# Patient Record
Sex: MALE | Race: WHITE | NOT HISPANIC OR LATINO | Employment: FULL TIME | URBAN - METROPOLITAN AREA
[De-identification: names, ages, dates, MRNs, and addresses within clinical notes are randomized per-mention and may not be internally consistent; named-entity substitution may affect disease eponyms.]

---

## 2017-02-01 DIAGNOSIS — Z00.00 HEALTHCARE MAINTENANCE: Primary | ICD-10-CM

## 2017-02-14 ENCOUNTER — OFFICE VISIT (OUTPATIENT)
Dept: INTERNAL MEDICINE | Facility: CLINIC | Age: 37
End: 2017-02-14
Payer: COMMERCIAL

## 2017-02-14 ENCOUNTER — LAB VISIT (OUTPATIENT)
Dept: LAB | Facility: HOSPITAL | Age: 37
End: 2017-02-14
Attending: FAMILY MEDICINE
Payer: COMMERCIAL

## 2017-02-14 VITALS
DIASTOLIC BLOOD PRESSURE: 80 MMHG | WEIGHT: 234.13 LBS | TEMPERATURE: 99 F | BODY MASS INDEX: 33.52 KG/M2 | SYSTOLIC BLOOD PRESSURE: 110 MMHG | HEART RATE: 80 BPM | HEIGHT: 70 IN

## 2017-02-14 DIAGNOSIS — Z00.00 ANNUAL PHYSICAL EXAM: Primary | ICD-10-CM

## 2017-02-14 DIAGNOSIS — Z00.00 HEALTHCARE MAINTENANCE: ICD-10-CM

## 2017-02-14 DIAGNOSIS — Z00.00 ANNUAL PHYSICAL EXAM: ICD-10-CM

## 2017-02-14 LAB
ALBUMIN SERPL BCP-MCNC: 4.3 G/DL
ALP SERPL-CCNC: 69 U/L
ALT SERPL W/O P-5'-P-CCNC: 35 U/L
ANION GAP SERPL CALC-SCNC: 6 MMOL/L
AST SERPL-CCNC: 23 U/L
BASOPHILS # BLD AUTO: 0.01 K/UL
BASOPHILS NFR BLD: 0.1 %
BILIRUB SERPL-MCNC: 0.7 MG/DL
BUN SERPL-MCNC: 13 MG/DL
CALCIUM SERPL-MCNC: 10 MG/DL
CHLORIDE SERPL-SCNC: 102 MMOL/L
CHOLEST/HDLC SERPL: 4.3 {RATIO}
CO2 SERPL-SCNC: 32 MMOL/L
CREAT SERPL-MCNC: 1 MG/DL
DIFFERENTIAL METHOD: NORMAL
EOSINOPHIL # BLD AUTO: 0.1 K/UL
EOSINOPHIL NFR BLD: 0.6 %
ERYTHROCYTE [DISTWIDTH] IN BLOOD BY AUTOMATED COUNT: 14.1 %
EST. GFR  (AFRICAN AMERICAN): >60 ML/MIN/1.73 M^2
EST. GFR  (NON AFRICAN AMERICAN): >60 ML/MIN/1.73 M^2
GLUCOSE SERPL-MCNC: 87 MG/DL
HCT VFR BLD AUTO: 47.8 %
HDL/CHOLESTEROL RATIO: 23.1 %
HDLC SERPL-MCNC: 216 MG/DL
HDLC SERPL-MCNC: 50 MG/DL
HGB BLD-MCNC: 16.8 G/DL
LDLC SERPL CALC-MCNC: 148.6 MG/DL
LYMPHOCYTES # BLD AUTO: 2.2 K/UL
LYMPHOCYTES NFR BLD: 26.4 %
MAGNESIUM SERPL-MCNC: 2.3 MG/DL
MCH RBC QN AUTO: 30.9 PG
MCHC RBC AUTO-ENTMCNC: 35.1 %
MCV RBC AUTO: 88 FL
MONOCYTES # BLD AUTO: 0.5 K/UL
MONOCYTES NFR BLD: 6.2 %
NEUTROPHILS # BLD AUTO: 5.6 K/UL
NEUTROPHILS NFR BLD: 66.3 %
NONHDLC SERPL-MCNC: 166 MG/DL
PLATELET # BLD AUTO: 232 K/UL
PMV BLD AUTO: 9.7 FL
POTASSIUM SERPL-SCNC: 4.8 MMOL/L
PROT SERPL-MCNC: 7.7 G/DL
RBC # BLD AUTO: 5.44 M/UL
SODIUM SERPL-SCNC: 140 MMOL/L
TRIGL SERPL-MCNC: 87 MG/DL
TSH SERPL DL<=0.005 MIU/L-ACNC: 1.29 UIU/ML
WBC # BLD AUTO: 8.42 K/UL

## 2017-02-14 PROCEDURE — 80061 LIPID PANEL: CPT

## 2017-02-14 PROCEDURE — 99395 PREV VISIT EST AGE 18-39: CPT | Mod: S$GLB,,, | Performed by: FAMILY MEDICINE

## 2017-02-14 PROCEDURE — 99999 PR PBB SHADOW E&M-EST. PATIENT-LVL III: CPT | Mod: PBBFAC,,, | Performed by: FAMILY MEDICINE

## 2017-02-14 PROCEDURE — 36415 COLL VENOUS BLD VENIPUNCTURE: CPT | Mod: PO

## 2017-02-14 PROCEDURE — 83735 ASSAY OF MAGNESIUM: CPT

## 2017-02-14 PROCEDURE — 84443 ASSAY THYROID STIM HORMONE: CPT

## 2017-02-14 PROCEDURE — 85025 COMPLETE CBC W/AUTO DIFF WBC: CPT

## 2017-02-14 PROCEDURE — 80053 COMPREHEN METABOLIC PANEL: CPT

## 2017-02-14 RX ORDER — FINASTERIDE 1 MG/1
1 TABLET, FILM COATED ORAL DAILY
COMMUNITY
End: 2017-02-14 | Stop reason: SDUPTHER

## 2017-02-14 RX ORDER — FINASTERIDE 1 MG/1
1 TABLET, FILM COATED ORAL DAILY
Qty: 30 TABLET | Refills: 11 | Status: SHIPPED | OUTPATIENT
Start: 2017-02-14 | End: 2018-04-23 | Stop reason: SDUPTHER

## 2017-02-14 NOTE — MR AVS SNAPSHOT
[facility]  [address] [place] [address]  Phone: [phone]  Fax: [phone]                  Luiz Ybarra   2017 9:40 AM   Office Visit    Description:  Male : 1980   Provider:  Rodrigo Hernandez MD   [facility]           Reason for Visit     Annual Exam           Diagnoses this Visit        Comments    Annual physical exam    -  Primary            To Do List           Future Appointments        Provider Department Dept Phone    2017 2:20 PM LABORATORY, [facility] [phone]      Goals (5 Years of Data)     None      Follow-Up and Disposition     Return in about 1 year (around 2018), or if symptoms worsen or fail to improve.       These Medications        Disp Refills Start End    finasteride (PROPECIA) 1 mg tablet 30 tablet 11 2017     Take 1 tablet (1 mg total) by mouth once daily. - Oral    Pharm[place]: [facility] [place] [address] Ph #: [phone]         [facility]     [facility] Nurse Care Line -  Assistance  Registered nurses in the [facility] provide clinical advisement, health education, appointment booking, and other advisory services.  Call for this free service at [phone].             Medications           Message regarding Medications     Verify the changes and/or additions to your medication regime listed below are the same as discussed with your clinician today.  If any of these changes or additions are incorrect, please notify your healthcare provider.        START taking these NEW medications        Refills    finasteride (PROPECIA) 1 mg tablet 11    Sig: Take 1 tablet (1 mg total) by mouth once daily.    Class: Normal    Route: Oral           Verify that the below list of medications is an accurate representation of the medications you are currently taking.  If none  "reported, the list may be blank. If incorrect, please contact your healthcare provider. Carry this list with you in case of emergency.           Current Medications     finasteride (PROPECIA) 1 mg tablet Take 1 tablet (1 mg total) by mouth once daily.    ASCORBATE CALCIUM (VITAMIN C ORAL) Take by mouth once daily.     levocetirizine (XYZAL) 5 MG tablet Take 1 tablet (5 mg total) by mouth every evening.    triamcinolone acetonide 0.1% (KENALOG) 0.1 % cream Apply topically 2 (two) times daily.           Clinical Reference Information           Your Vitals Were     BP Pulse Temp Height Weight BMI    110/80 80 98.8 °F (37.1 °C) 5' 9.5" (1.765 m) 106.2 kg (234 lb 2.1 oz) 34.08 kg/m2      Blood Pressure          Most Recent Value    BP  110/80      Allergies as of 2/14/2017     No Known Allergies      Immunizations Administered on Date of Encounter - 2/14/2017     None      Orders Placed During Today's Visit     Future Labs/Procedures Expected by Expires    Magnesium  2/14/2017 4/15/2018      Language Assistance Services     ATTENTION: Language assistance services are available, free of charge. Please call [phone].      ATENCIÓN: Si scarla joe, tiene a pinzon disposición servicios gratuitos de asistencia lingüística. Llame al [phone].     [facility] Ý: N?u b?n nói Ti?ng Vi?t, có các d?ch v? h? tr? ngôn ng? mi?n phí dành cho b?n. G?i s? [phone].         [facility] complies with applicable Federal civil rights laws and does not discriminate on the basis of race, color, national origin, age, disability, or sex.        "

## 2017-02-16 NOTE — PROGRESS NOTES
"Subjective:      Patient ID: Luiz Ybarra is a 37 y.o. male.    Chief Complaint: Annual Exam    HPI  38 yo male here to update annual visit.  Has gained some weight since physical 3-4 yrs ago.  Not eating well, not exercising.  C/O occ muscle spasms in extremities.  Does not drink water much at all, just soda.  All day.  Vaccines up to date.  Needs refill on Propecia    Past Medical History   Diagnosis Date    Hypoglycemia      Family History   Problem Relation Age of Onset    Diabetes Father     Diabetes Sister     Diabetes Maternal Grandmother     Colon cancer Maternal Grandmother     Diabetes Paternal Grandmother     Cancer Paternal Grandfather     Colon cancer Maternal Uncle     Heart disease Neg Hx      Past Surgical History   Procedure Laterality Date    Knee surgery       Social History   Substance Use Topics    Smoking status: Never Smoker    Smokeless tobacco: Never Used    Alcohol use No       Visit Vitals    /80    Pulse 80    Temp 98.8 °F (37.1 °C)    Ht 5' 9.5" (1.765 m)    Wt 106.2 kg (234 lb 2.1 oz)    BMI 34.08 kg/m2       Review of Systems   Constitutional: Positive for fatigue. Negative for activity change, appetite change, chills, diaphoresis, fever and unexpected weight change.   HENT: Negative for hearing loss and tinnitus.    Eyes: Negative for visual disturbance.   Respiratory: Negative for cough, chest tightness, shortness of breath and wheezing.    Cardiovascular: Negative for chest pain, palpitations and leg swelling.   Gastrointestinal: Negative for abdominal distention, abdominal pain, constipation and diarrhea.   Genitourinary: Negative for difficulty urinating.   Musculoskeletal: Negative for arthralgias and back pain.   Skin: Negative.    Neurological: Negative for dizziness, weakness, numbness and headaches.   Psychiatric/Behavioral: Negative.      Objective:     Physical Exam   Constitutional: He is oriented to person, place, and time. He appears " well-developed and well-nourished. No distress.   HENT:   Right Ear: External ear normal.   Left Ear: External ear normal.   Nose: Nose normal.   Mouth/Throat: Oropharynx is clear and moist.   Eyes: Pupils are equal, round, and reactive to light.   Neck: Normal range of motion. Neck supple. No thyromegaly present.   Cardiovascular: Normal rate, regular rhythm and normal heart sounds.    No murmur heard.  Pulmonary/Chest: Effort normal and breath sounds normal. No respiratory distress. He has no wheezes.   Abdominal: Soft. Bowel sounds are normal. He exhibits no distension and no mass.   Musculoskeletal: He exhibits no edema or tenderness.   Lymphadenopathy:     He has no cervical adenopathy.   Neurological: He is alert and oriented to person, place, and time. He has normal strength. He displays no tremor. No cranial nerve deficit. He exhibits normal muscle tone. Coordination normal.   Skin: Skin is warm and dry. He is not diaphoretic.   Nursing note and vitals reviewed.      Lab Results   Component Value Date    WBC 8.42 02/14/2017    HGB 16.8 02/14/2017    HCT 47.8 02/14/2017     02/14/2017    CHOL 216 (H) 02/14/2017    TRIG 87 02/14/2017    HDL 50 02/14/2017    ALT 35 02/14/2017    AST 23 02/14/2017     02/14/2017    K 4.8 02/14/2017     02/14/2017    CREATININE 1.0 02/14/2017    BUN 13 02/14/2017    CO2 32 (H) 02/14/2017    TSH 1.287 02/14/2017       Assessment:     1. Annual physical exam       Plan:   Annual physical exam  -     Magnesium; Future; Expected date: 2/14/17    Other orders  -     finasteride (PROPECIA) 1 mg tablet; Take 1 tablet (1 mg total) by mouth once daily.  Dispense: 30 tablet; Refill: 11    Update annual labs, Add Mg  Drink more water, reduce caffeine intake..slowly.  If no improvement let MD know.  No twitching/fasiculations noted on exam.  No abnormal muscular or Neuro findings.  Refill on Propecia  Focus on weight loss with better eating habits and exercise  F/u  annually and PRN

## 2017-07-19 ENCOUNTER — LAB VISIT (OUTPATIENT)
Dept: LAB | Facility: HOSPITAL | Age: 37
End: 2017-07-19
Attending: NURSE PRACTITIONER
Payer: COMMERCIAL

## 2017-07-19 ENCOUNTER — OFFICE VISIT (OUTPATIENT)
Dept: URGENT CARE | Facility: CLINIC | Age: 37
End: 2017-07-19
Payer: COMMERCIAL

## 2017-07-19 VITALS
BODY MASS INDEX: 32.17 KG/M2 | DIASTOLIC BLOOD PRESSURE: 70 MMHG | OXYGEN SATURATION: 98 % | HEART RATE: 71 BPM | TEMPERATURE: 98 F | WEIGHT: 224.75 LBS | SYSTOLIC BLOOD PRESSURE: 116 MMHG | HEIGHT: 70 IN

## 2017-07-19 DIAGNOSIS — M79.674 CHRONIC TOE PAIN, RIGHT FOOT: Primary | ICD-10-CM

## 2017-07-19 DIAGNOSIS — M79.674 CHRONIC TOE PAIN, RIGHT FOOT: ICD-10-CM

## 2017-07-19 DIAGNOSIS — G89.29 CHRONIC TOE PAIN, RIGHT FOOT: Primary | ICD-10-CM

## 2017-07-19 DIAGNOSIS — G89.29 CHRONIC TOE PAIN, RIGHT FOOT: ICD-10-CM

## 2017-07-19 LAB — URATE SERPL-MCNC: 8.5 MG/DL

## 2017-07-19 PROCEDURE — 99214 OFFICE O/P EST MOD 30 MIN: CPT | Mod: S$GLB,,, | Performed by: NURSE PRACTITIONER

## 2017-07-19 PROCEDURE — 84550 ASSAY OF BLOOD/URIC ACID: CPT | Mod: PO

## 2017-07-19 PROCEDURE — 36415 COLL VENOUS BLD VENIPUNCTURE: CPT | Mod: PO

## 2017-07-19 PROCEDURE — 99999 PR PBB SHADOW E&M-EST. PATIENT-LVL IV: CPT | Mod: PBBFAC,,, | Performed by: NURSE PRACTITIONER

## 2017-07-19 RX ORDER — TRAMADOL HYDROCHLORIDE 50 MG/1
50 TABLET ORAL EVERY 6 HOURS PRN
Qty: 16 TABLET | Refills: 0 | Status: SHIPPED | OUTPATIENT
Start: 2017-07-19 | End: 2017-07-29

## 2017-07-19 RX ORDER — IBUPROFEN 800 MG/1
800 TABLET ORAL EVERY 8 HOURS PRN
Qty: 20 TABLET | Refills: 0 | Status: SHIPPED | OUTPATIENT
Start: 2017-07-19 | End: 2018-07-23

## 2017-07-19 NOTE — PROGRESS NOTES
Subjective:       Patient ID: Luiz Ybarra is a 37 y.o. male.    Chief Complaint: arthritis of big toe    Pt is a 37 year old male to clinic today with complaints of right great toe pain and arthritis that began this morning. Pt states he has seen ortho and has ruled out gout and was dx with arthritis.       Toe Pain    The incident occurred 3 to 6 hours ago. The incident occurred at home. There was no injury mechanism. The pain is present in the right toes. The quality of the pain is described as aching. The pain is at a severity of 4/10. The pain is mild. The pain has been constant since onset. Pertinent negatives include no inability to bear weight, loss of motion, loss of sensation, muscle weakness, numbness or tingling. He reports no foreign bodies present. The symptoms are aggravated by movement and weight bearing. He has tried NSAIDs for the symptoms. The treatment provided mild relief.     Review of Systems   Constitutional: Negative for chills, diaphoresis, fatigue and fever.   HENT: Negative for congestion, sinus pressure and sore throat.    Eyes: Negative for pain.   Respiratory: Negative for cough, chest tightness, shortness of breath and wheezing.    Cardiovascular: Negative for chest pain and palpitations.   Gastrointestinal: Negative for abdominal pain, diarrhea, nausea and vomiting.   Genitourinary: Negative for dysuria.   Musculoskeletal: Positive for arthralgias and gait problem (ambulating with limp). Negative for back pain, myalgias and neck pain.   Skin: Negative for rash.   Neurological: Negative for dizziness, tingling, light-headedness, numbness and headaches.       Objective:      Physical Exam   Constitutional: He is oriented to person, place, and time. He appears well-developed and well-nourished. No distress.   HENT:   Head: Normocephalic.   Right Ear: External ear normal.   Left Ear: External ear normal.   Nose: Nose normal.   Eyes: Pupils are equal, round, and reactive to light.    Musculoskeletal: Normal range of motion. He exhibits tenderness. He exhibits no edema or deformity.        Right foot: There is tenderness. There is normal range of motion, no bony tenderness, no swelling, normal capillary refill, no crepitus, no deformity and no laceration.        Feet:    Neurological: He is alert and oriented to person, place, and time.   Skin: Skin is warm and dry. Capillary refill takes 2 to 3 seconds. No rash noted. He is not diaphoretic.   Psychiatric: He has a normal mood and affect. His speech is normal and behavior is normal.   Nursing note and vitals reviewed.      Assessment:       1. Chronic toe pain, right foot        Plan:   Chronic toe pain, right foot  -     Uric acid; Future; Expected date: 07/19/2017  -     tramadol (ULTRAM) 50 mg tablet; Take 1 tablet (50 mg total) by mouth every 6 (six) hours as needed for Pain.  Dispense: 16 tablet; Refill: 0  -     ibuprofen (ADVIL,MOTRIN) 800 MG tablet; Take 1 tablet (800 mg total) by mouth every 8 (eight) hours as needed for Pain.  Dispense: 20 tablet; Refill: 0      Tramadol causes drowsiness. Take at bedtime.  Will call with abnormal lab results.  Recommend RICE method.     Follow prescribed treatment plan as directed.  Stay hydrated and rest.  Report to ER if symptoms worsen.  Follow up with PCP in 2-3 days or sooner if symptoms do not improve.

## 2017-07-19 NOTE — PATIENT INSTRUCTIONS

## 2017-09-19 ENCOUNTER — PATIENT MESSAGE (OUTPATIENT)
Dept: INTERNAL MEDICINE | Facility: CLINIC | Age: 37
End: 2017-09-19

## 2017-09-19 ENCOUNTER — OFFICE VISIT (OUTPATIENT)
Dept: INTERNAL MEDICINE | Facility: CLINIC | Age: 37
End: 2017-09-19
Payer: COMMERCIAL

## 2017-09-19 ENCOUNTER — HOSPITAL ENCOUNTER (OUTPATIENT)
Dept: RADIOLOGY | Facility: HOSPITAL | Age: 37
Discharge: HOME | End: 2017-09-19
Attending: FAMILY MEDICINE
Payer: COMMERCIAL

## 2017-09-19 VITALS
SYSTOLIC BLOOD PRESSURE: 104 MMHG | HEIGHT: 69 IN | WEIGHT: 214.5 LBS | DIASTOLIC BLOOD PRESSURE: 80 MMHG | BODY MASS INDEX: 31.77 KG/M2 | HEART RATE: 70 BPM | TEMPERATURE: 97 F

## 2017-09-19 DIAGNOSIS — R19.4 BOWEL HABIT CHANGES: ICD-10-CM

## 2017-09-19 DIAGNOSIS — R10.32 LLQ PAIN: Primary | ICD-10-CM

## 2017-09-19 DIAGNOSIS — R10.32 LLQ PAIN: ICD-10-CM

## 2017-09-19 PROCEDURE — 74177 CT ABD & PELVIS W/CONTRAST: CPT | Mod: TC

## 2017-09-19 PROCEDURE — 99214 OFFICE O/P EST MOD 30 MIN: CPT | Mod: S$GLB,,, | Performed by: FAMILY MEDICINE

## 2017-09-19 PROCEDURE — 99999 PR PBB SHADOW E&M-EST. PATIENT-LVL III: CPT | Mod: PBBFAC,,, | Performed by: FAMILY MEDICINE

## 2017-09-19 PROCEDURE — 3008F BODY MASS INDEX DOCD: CPT | Mod: S$GLB,,, | Performed by: FAMILY MEDICINE

## 2017-09-19 PROCEDURE — 25500020 PHARM REV CODE 255: Performed by: FAMILY MEDICINE

## 2017-09-19 RX ADMIN — IOHEXOL 75 ML: 350 INJECTION, SOLUTION INTRAVENOUS at 01:09

## 2017-09-19 RX ADMIN — IOHEXOL 30 ML: 350 INJECTION, SOLUTION INTRAVENOUS at 11:09

## 2017-09-19 NOTE — TELEPHONE ENCOUNTER
Need pt to see GI/MD please, for LLQ pain/abnormal CT  Dr. Ybarra's .    Please schedule soonest available.

## 2017-09-19 NOTE — PROGRESS NOTES
"Subjective:      Patient ID: Luiz Ybarra is a 37 y.o. male.    Chief Complaint: Abdominal Pain (x 1 mo)    HPI  36 yo male here with c/o LLQ pain for about one month now.  Last month, at onset of pain he was double over due to the pain in the LLQ.  He saw one of his dad's partners, felt maybe it was diverticulitis.  Given flagyl and pain kind of resolved.  He has had a continuous/dull pain in the LLQ.  No radiation.  Bowels vary, used to be very regular.  Now, since Feb and changing up his diet, doing low carb/high protein he finds he is more constipated.  No blood in the stool.  Intentional weight loss.  No fever/chills.  No N/V.   Maternal side with several members who had Colon Ca.  That does worry him some.    Past Medical History:   Diagnosis Date    Hypoglycemia      Family History   Problem Relation Age of Onset    Diabetes Father     Diabetes Sister     Diabetes Maternal Grandmother     Colon cancer Maternal Grandmother     Diabetes Paternal Grandmother     Cancer Paternal Grandfather     Colon cancer Maternal Uncle     Heart disease Neg Hx      Past Surgical History:   Procedure Laterality Date    KNEE SURGERY       Social History   Substance Use Topics    Smoking status: Never Smoker    Smokeless tobacco: Never Used    Alcohol use No       /80   Pulse 70   Temp 96.7 °F (35.9 °C) (Tympanic)   Ht 5' 9.25" (1.759 m)   Wt 97.3 kg (214 lb 8.1 oz)   BMI 31.45 kg/m²     Review of Systems   Constitutional: Negative for appetite change, chills, fever and unexpected weight change.   HENT: Negative.    Respiratory: Negative.    Cardiovascular: Negative.    Gastrointestinal: Positive for abdominal pain. Negative for abdominal distention, anal bleeding, nausea and vomiting.   Genitourinary: Negative.      Objective:     Physical Exam   Constitutional: He appears well-developed and well-nourished.   Cardiovascular: Normal rate, regular rhythm and normal heart sounds.    Pulmonary/Chest: " Effort normal and breath sounds normal. No respiratory distress. He has no wheezes.   Abdominal: Soft. Bowel sounds are normal. He exhibits no distension and no mass. There is tenderness in the left lower quadrant. There is no rigidity, no rebound and no guarding.   Nursing note and vitals reviewed.      Lab Results   Component Value Date    WBC 8.42 02/14/2017    HGB 16.8 02/14/2017    HCT 47.8 02/14/2017     02/14/2017    CHOL 216 (H) 02/14/2017    TRIG 87 02/14/2017    HDL 50 02/14/2017    ALT 35 02/14/2017    AST 23 02/14/2017     02/14/2017    K 4.8 02/14/2017     02/14/2017    CREATININE 1.0 02/14/2017    BUN 13 02/14/2017    CO2 32 (H) 02/14/2017    TSH 1.287 02/14/2017       Assessment:     1. LLQ pain    2. Bowel habit changes       Plan:   LLQ pain  -     CT Abdomen Pelvis With Contrast; Future; Expected date: 09/19/2017    Bowel habit changes  -     CT Abdomen Pelvis With Contrast; Future; Expected date: 09/19/2017    Seems a bit young for diverticulitis, but possible given some of the dietary changes.  No labs today, no sign of active infection.  Consider Crohn's/UC??  Will get CT of the abd/pelvis and likely consider GI consult to discuss Colon screening b/c of hx.  Sister, 41 yo has had scope with +precancerous polyps.  F/U to be determined

## 2017-09-20 ENCOUNTER — PATIENT MESSAGE (OUTPATIENT)
Dept: INTERNAL MEDICINE | Facility: CLINIC | Age: 37
End: 2017-09-20

## 2017-09-20 NOTE — TELEPHONE ENCOUNTER
Booked pt for 9-25-17 with Dr. Colón [facility].  Is there anyway you can work pt in sooner?  He is Dr. Ybarra .  Thank you!  Any Doctor would be fine.

## 2017-10-09 ENCOUNTER — OFFICE VISIT (OUTPATIENT)
Dept: GASTROENTEROLOGY | Facility: CLINIC | Age: 37
End: 2017-10-09
Payer: COMMERCIAL

## 2017-10-09 ENCOUNTER — HOSPITAL ENCOUNTER (OUTPATIENT)
Dept: RADIOLOGY | Facility: HOSPITAL | Age: 37
Discharge: HOME | End: 2017-10-09
Attending: INTERNAL MEDICINE
Payer: COMMERCIAL

## 2017-10-09 VITALS
BODY MASS INDEX: 31.71 KG/M2 | WEIGHT: 214.06 LBS | SYSTOLIC BLOOD PRESSURE: 110 MMHG | HEIGHT: 69 IN | DIASTOLIC BLOOD PRESSURE: 80 MMHG | HEART RATE: 74 BPM

## 2017-10-09 DIAGNOSIS — M79.3 PANNICULITIS: ICD-10-CM

## 2017-10-09 DIAGNOSIS — R10.32 LLQ ABDOMINAL PAIN: Primary | ICD-10-CM

## 2017-10-09 DIAGNOSIS — K57.30 DIVERTICULOSIS OF LARGE INTESTINE WITHOUT HEMORRHAGE: ICD-10-CM

## 2017-10-09 DIAGNOSIS — R10.32 LLQ ABDOMINAL PAIN: ICD-10-CM

## 2017-10-09 DIAGNOSIS — K59.04 CHRONIC IDIOPATHIC CONSTIPATION: ICD-10-CM

## 2017-10-09 PROCEDURE — 74020 XR ABDOMEN FLAT AND ERECT: CPT | Mod: TC

## 2017-10-09 PROCEDURE — 74020 XR ABDOMEN FLAT AND ERECT: CPT | Mod: 26,,, | Performed by: RADIOLOGY

## 2017-10-09 PROCEDURE — 99999 PR PBB SHADOW E&M-EST. PATIENT-LVL III: CPT | Mod: PBBFAC,,, | Performed by: INTERNAL MEDICINE

## 2017-10-09 PROCEDURE — 99203 OFFICE O/P NEW LOW 30 MIN: CPT | Mod: S$GLB,,, | Performed by: INTERNAL MEDICINE

## 2017-10-09 RX ORDER — SODIUM, POTASSIUM,MAG SULFATES 17.5-3.13G
SOLUTION, RECONSTITUTED, ORAL ORAL
Qty: 254 ML | Refills: 0 | Status: ON HOLD | OUTPATIENT
Start: 2017-10-09 | End: 2017-11-06 | Stop reason: CLARIF

## 2017-10-09 NOTE — PROGRESS NOTES
Subjective:       Patient ID: Luiz Ybarra is a 37 y.o. male.    Chief Complaint: Abdominal Pain; Constipation; and Diarrhea    The patient presents with complaint of LLQ pain starting about 2 months ago. He has started on a low carb diet with weight loss of ~ 25 pounds over the last 8 months. He did not see any real change in his bowel activity initially, but had more diarrhea before the start of the diet, but now has had more constipation. There was no change in bowel habits at the time of the onset of pain and he had no fever chills or sweats. The pain he describes as sharp in character and there were no aggrevating or alleviating factors. It would come and go lasting a few minutes, but when it was gone it would not stay away for long. He saw a physician in his home town, and was suspected of having diverticulitis but was unable to see an MD for 3 weeks. Fortunately the discomfort's intensity  down. He was treated with Flagyl by his home MD for ~ 10 days, and his symptoms improved over time. He did not have Cipro.     There was no associated nausea or vomiting. There was no BRBPR, and no unintended weight loss during the time of his symptoms. There was no true diarrhea, as during more questioning the increased frequency of BMs described was actually formed stool .     He was seen by his PCP and referred to GI. He had a CT done off the abdomen and pelvis, revealing diverticulosis with suspicion of a low grade diverticulitis versus panniculitis with shoddy mesenteric nodes.       Review of Systems   Constitutional: Positive for fatigue. Negative for activity change, appetite change, chills, diaphoresis, fever and unexpected weight change.   HENT: Negative for congestion, ear discharge, facial swelling, hearing loss, nosebleeds, postnasal drip, sinus pressure, sneezing, tinnitus, trouble swallowing and voice change.    Eyes: Negative for photophobia, redness and visual disturbance.   Respiratory:  Negative for cough, chest tightness, shortness of breath and wheezing.    Cardiovascular: Negative for chest pain and palpitations.   Gastrointestinal: Positive for abdominal pain and constipation. Negative for abdominal distention, blood in stool, diarrhea, nausea, rectal pain and vomiting.   Genitourinary: Negative for difficulty urinating, discharge, dysuria, flank pain, frequency, hematuria, scrotal swelling, testicular pain and urgency.   Musculoskeletal: Negative for arthralgias, back pain, gait problem, joint swelling, myalgias and neck stiffness.        Leg cramps   Skin: Negative for color change, pallor, rash and wound.   Neurological: Positive for headaches. Negative for dizziness, tremors, seizures, syncope, facial asymmetry, speech difficulty, weakness, light-headedness and numbness.   Hematological: Negative for adenopathy. Does not bruise/bleed easily.   Psychiatric/Behavioral: Negative for agitation, confusion, hallucinations, sleep disturbance and suicidal ideas.       Objective:      Physical Exam   Constitutional: He is oriented to person, place, and time. He appears well-developed and well-nourished. No distress.   HENT:   Head: Normocephalic and atraumatic.   Nose: Nose normal.   Mouth/Throat: Oropharynx is clear and moist. No oropharyngeal exudate.   Eyes: Conjunctivae are normal. Pupils are equal, round, and reactive to light. No scleral icterus.   Neck: Normal range of motion. Neck supple. No thyromegaly present.   Cardiovascular: Normal rate and regular rhythm.  Exam reveals no gallop and no friction rub.    No murmur heard.  Pulmonary/Chest: Effort normal and breath sounds normal. No respiratory distress. He has no wheezes. He has no rales.   Abdominal: Soft. Bowel sounds are normal. He exhibits no distension and no mass. There is tenderness. There is no rebound and no guarding.   Mild tenderness LLQ   Musculoskeletal: He exhibits no edema or tenderness.   Lymphadenopathy:     He has no  cervical adenopathy.   Neurological: He is alert and oriented to person, place, and time. He exhibits normal muscle tone. Coordination normal.   Skin: Skin is warm. No rash noted. He is not diaphoretic.   Psychiatric: He has a normal mood and affect. His behavior is normal. Judgment and thought content normal.   Vitals reviewed.      Assessment:    Diverticulosis   LLQ Abdominal Pain   Possible diverticulitis vs panniculitis       Treated with Flagyl   Constipation   FH colon polyps and Cancer  No diagnosis found.    Plan:     KUB. Colonoscopy

## 2017-10-09 NOTE — LETTER
October 9, 2017      Rodrigo Hernandez MD  [address] [place] [address]           [facility]  [address] [place] [address]  Phone: [phone]  Fax: [phone]          Patient: Luiz Ybarra   MR Number: [id number]   YOB: 1980   Date of Visit: 10/9/2017       Dear Dr. Rodrigo Hernandez:    Thank you for referring Luiz Ybarra to me for evaluation. Attached you will find relevant portions of my assessment and plan of care.    If you have questions, please do not hesitate to call me. I look forward to following Luiz Ybarra along with you.    Sincerely,    Jovani Colón III, MD    Enclosure  CC:  No Recipients    If you would like to receive this communication electronically, please contact [email or web address] or [phone] to request more information on [employer] access.    For providers and/or their staff who would like to refer a patient to [facility], please contact us through our one-stop-shop provider referral line, [facility], at [phone].    If you feel you have received this communication in error or would no longer like to receive these types of communications, please e-mail [email or web address]

## 2017-10-18 ENCOUNTER — PATIENT MESSAGE (OUTPATIENT)
Dept: GASTROENTEROLOGY | Facility: CLINIC | Age: 37
End: 2017-10-18

## 2017-11-06 ENCOUNTER — HOSPITAL ENCOUNTER (OUTPATIENT)
Facility: HOSPITAL | Age: 37
Discharge: HOME | End: 2017-11-06
Attending: INTERNAL MEDICINE | Admitting: INTERNAL MEDICINE
Payer: COMMERCIAL

## 2017-11-06 ENCOUNTER — SURGERY (OUTPATIENT)
Age: 37
End: 2017-11-06

## 2017-11-06 ENCOUNTER — ANESTHESIA (OUTPATIENT)
Dept: ENDOSCOPY | Facility: HOSPITAL | Age: 37
End: 2017-11-06
Payer: COMMERCIAL

## 2017-11-06 ENCOUNTER — ANESTHESIA EVENT (OUTPATIENT)
Dept: ENDOSCOPY | Facility: HOSPITAL | Age: 37
End: 2017-11-06
Payer: COMMERCIAL

## 2017-11-06 VITALS — RESPIRATION RATE: 15 BRPM

## 2017-11-06 DIAGNOSIS — R10.32 LLQ ABDOMINAL PAIN: Primary | ICD-10-CM

## 2017-11-06 DIAGNOSIS — R19.4 CHANGE IN BOWEL HABITS: ICD-10-CM

## 2017-11-06 DIAGNOSIS — K57.30 DIVERTICULOSIS OF LARGE INTESTINE WITHOUT HEMORRHAGE: ICD-10-CM

## 2017-11-06 DIAGNOSIS — R93.5 ABNORMAL ABDOMINAL CT SCAN: ICD-10-CM

## 2017-11-06 PROCEDURE — 37000008 HC ANESTHESIA 1ST 15 MINUTES: Performed by: INTERNAL MEDICINE

## 2017-11-06 PROCEDURE — 63600175 PHARM REV CODE 636 W HCPCS: Performed by: NURSE ANESTHETIST, CERTIFIED REGISTERED

## 2017-11-06 PROCEDURE — 45378 DIAGNOSTIC COLONOSCOPY: CPT | Mod: ,,, | Performed by: INTERNAL MEDICINE

## 2017-11-06 PROCEDURE — 37000009 HC ANESTHESIA EA ADD 15 MINS: Performed by: INTERNAL MEDICINE

## 2017-11-06 PROCEDURE — 25000003 PHARM REV CODE 250: Performed by: INTERNAL MEDICINE

## 2017-11-06 PROCEDURE — 45378 DIAGNOSTIC COLONOSCOPY: CPT | Performed by: INTERNAL MEDICINE

## 2017-11-06 PROCEDURE — 25000003 PHARM REV CODE 250: Performed by: NURSE ANESTHETIST, CERTIFIED REGISTERED

## 2017-11-06 RX ORDER — PROPOFOL 10 MG/ML
VIAL (ML) INTRAVENOUS
Status: DISCONTINUED | OUTPATIENT
Start: 2017-11-06 | End: 2017-11-06

## 2017-11-06 RX ORDER — SODIUM CHLORIDE, SODIUM LACTATE, POTASSIUM CHLORIDE, CALCIUM CHLORIDE 600; 310; 30; 20 MG/100ML; MG/100ML; MG/100ML; MG/100ML
INJECTION, SOLUTION INTRAVENOUS CONTINUOUS
Status: DISCONTINUED | OUTPATIENT
Start: 2017-11-06 | End: 2017-11-06 | Stop reason: HOSPADM

## 2017-11-06 RX ORDER — SODIUM CHLORIDE, SODIUM LACTATE, POTASSIUM CHLORIDE, CALCIUM CHLORIDE 600; 310; 30; 20 MG/100ML; MG/100ML; MG/100ML; MG/100ML
INJECTION, SOLUTION INTRAVENOUS CONTINUOUS PRN
Status: DISCONTINUED | OUTPATIENT
Start: 2017-11-06 | End: 2017-11-06

## 2017-11-06 RX ORDER — LIDOCAINE HYDROCHLORIDE 20 MG/ML
INJECTION, SOLUTION EPIDURAL; INFILTRATION; INTRACAUDAL; PERINEURAL
Status: DISCONTINUED | OUTPATIENT
Start: 2017-11-06 | End: 2017-11-06

## 2017-11-06 RX ADMIN — PROPOFOL 70 MG: 10 INJECTION, EMULSION INTRAVENOUS at 07:11

## 2017-11-06 RX ADMIN — PROPOFOL 30 MG: 10 INJECTION, EMULSION INTRAVENOUS at 08:11

## 2017-11-06 RX ADMIN — SODIUM CHLORIDE, SODIUM LACTATE, POTASSIUM CHLORIDE, AND CALCIUM CHLORIDE: .6; .31; .03; .02 INJECTION, SOLUTION INTRAVENOUS at 07:11

## 2017-11-06 RX ADMIN — LIDOCAINE HYDROCHLORIDE 40 MG: 20 INJECTION, SOLUTION EPIDURAL; INFILTRATION; INTRACAUDAL; PERINEURAL at 07:11

## 2017-11-06 RX ADMIN — SODIUM CHLORIDE, SODIUM LACTATE, POTASSIUM CHLORIDE, AND CALCIUM CHLORIDE: 600; 310; 30; 20 INJECTION, SOLUTION INTRAVENOUS at 07:11

## 2017-11-06 RX ADMIN — PROPOFOL 50 MG: 10 INJECTION, EMULSION INTRAVENOUS at 08:11

## 2017-11-06 NOTE — DISCHARGE INSTRUCTIONS

## 2017-11-06 NOTE — INTERVAL H&P NOTE
The patient has been examined and the H&P has been reviewed:  Family History   Problem Relation Age of Onset    Diabetes Father     Diabetes Sister     Colon polyps Sister     Diabetes Maternal Grandmother     Colon cancer Maternal Grandmother     Colon polyps Maternal Grandmother     Diabetes Paternal Grandmother     Cancer Paternal Grandfather     Colon cancer Maternal Uncle     Colon polyps Mother     Heart disease Neg Hx      Past Medical History:   Diagnosis Date    Hypoglycemia      Past Surgical History:   Procedure Laterality Date    KNEE SURGERY       Social History     Social History    Marital status:      Spouse name: N/A    Number of children: N/A    Years of education: N/A     Occupational History    Not on file.     Social History Main Topics    Smoking status: Never Smoker    Smokeless tobacco: Never Used    Alcohol use No    Drug use: No    Sexual activity: Not on file     Other Topics Concern    Not on file     Social History Narrative    No narrative on file     Review of patient's allergies indicates:  No Known Allergies  No current facility-administered medications on file prior to encounter.      Current Outpatient Prescriptions on File Prior to Encounter   Medication Sig Dispense Refill    finasteride (PROPECIA) 1 mg tablet Take 1 tablet (1 mg total) by mouth once daily. 30 tablet 11    multivitamin capsule Take 1 capsule by mouth once daily.      ibuprofen (ADVIL,MOTRIN) 800 MG tablet Take 1 tablet (800 mg total) by mouth every 8 (eight) hours as needed for Pain. 20 tablet 0    [DISCONTINUED] sodium,potassium,mag sulfates (SUPREP BOWEL PREP KIT) 17.5-3.13-1.6 gram SolR As directed for colonoscopy 254 mL 0           Anesthesia/Surgery risks, benefits and alternative options discussed and understood by patient/family.          Active Hospital Problems    Diagnosis  POA    LLQ abdominal pain [R10.32]  Yes      Resolved Hospital Problems    Diagnosis Date  Resolved POA   No resolved problems to display.

## 2017-11-06 NOTE — INTERVAL H&P NOTE
The patient has been examined and the H&P has been reviewed:I have reviewed this note and I agree with this assessment. The patient was seen in the GI office and remains stable for endoscopy at the time of this present evaluation.         Anesthesia/Surgery risks, benefits and alternative options discussed and understood by patient/family.          Active Hospital Problems    Diagnosis  POA    LLQ abdominal pain [R10.32]  Yes      Resolved Hospital Problems    Diagnosis Date Resolved POA   No resolved problems to display.

## 2017-11-06 NOTE — DISCHARGE SUMMARY
[facility]  Brief Operative Note     SUMMARY     Surgery Date: 11/6/2017     Surgeon(s) and Role:     * Jovani Colón III, MD - Primary    Assisting Surgeon: None    Pre-op Diagnosis:  LLQ abdominal pain [R10.32]  Chronic idiopathic constipation [K59.04]  Diverticulosis of large intestine without hemorrhage [K57.30]    Post-op Diagnosis:  Post-Op Diagnosis Codes:     * LLQ abdominal pain [R10.32]     * Chronic idiopathic constipation [K59.04]     * Diverticulosis of large intestine without hemorrhage [K57.30]    Procedure(s) (LRB):  COLONOSCOPY (N/A)    Anesthesia: Monitor Anesthesia Care    Description of the findings of the procedure: Procedures completed. See Procedure note for full details.    Findings/Key Components: Procedures completed. See Procedure note for full details.    Prosthetics/Devices: None    Estimated Blood Loss: * No values recorded between 11/6/2017 12:00 AM and 11/6/2017  8:13 AM *         Specimens:   Specimen (12h ago through future)    None          Discharge Note    SUMMARY     Admit Date: 11/6/2017    Discharge Date and Time: 11/6/2017    Hospital Course (synopsis of major diagnoses, care, treatment, and services provided during the course of the hospital stay):  Procedures completed. See Procedure note for full details. Discharge patient when discharge criteria met.    Final Diagnosis: Post-Op Diagnosis Codes:     * LLQ abdominal pain [R10.32]     * Chronic idiopathic constipation [K59.04]     * Diverticulosis of large intestine without hemorrhage [K57.30]    Disposition: Discharge patient when discharge criteria met.    Follow Up/Patient Instructions:       Medications:  Reconciled Home Medications: Current Discharge Medication List      CONTINUE these medications which have NOT CHANGED    Details   finasteride (PROPECIA) 1 mg tablet Take 1 tablet (1 mg total) by mouth once daily.  Qty: 30 tablet, Refills: 11      multivitamin capsule Take 1 capsule by mouth once  daily.      ibuprofen (ADVIL,MOTRIN) 800 MG tablet Take 1 tablet (800 mg total) by mouth every 8 (eight) hours as needed for Pain.  Qty: 20 tablet, Refills: 0    Associated Diagnoses: Chronic toe pain, right foot              Discharge Procedure Orders  Diet general     Activity as tolerated

## 2017-11-06 NOTE — ANESTHESIA PREPROCEDURE EVALUATION
11/06/2017  Luiz Ybarra is a 37 y.o., male.    Anesthesia Evaluation    I have reviewed the Patient Summary Reports.    I have reviewed the Nursing Notes.   I have reviewed the Medications.     Review of Systems  Anesthesia Hx:  No problems with previous Anesthesia    Social:  Non-Smoker, No Alcohol Use    Hematology/Oncology:  Hematology Normal   Oncology Normal     EENT/Dental:EENT/Dental Normal   Cardiovascular:  Cardiovascular Normal Exercise tolerance: good     Pulmonary:  Pulmonary Normal    Renal/:  Renal/ Normal     Hepatic/GI:  Hepatic/GI Normal    Musculoskeletal:  Musculoskeletal Normal    Neurological:  Neurology Normal    Endocrine:  Endocrine Normal    Dermatological:  Skin Normal    Psych:  Psychiatric Normal           Physical Exam  General:  Well nourished    Airway/Jaw/Neck:  Airway Findings: Mouth Opening: Normal Tongue: Normal  General Airway Assessment: Adult  Mallampati: II  TM Distance: Normal, at least 6 cm  Jaw/Neck Findings:  Neck ROM: Normal ROM      Dental:  Dental Findings: In tact   Chest/Lungs:  Chest/Lungs Findings: Clear to auscultation, Normal Respiratory Rate     Heart/Vascular:  Heart Findings: Rate: Normal  Rhythm: Regular Rhythm  Sounds: Normal     Abdomen:  Abdomen Findings:  Normal       Mental Status:  Mental Status Findings:  Cooperative, Alert and Oriented         Anesthesia Plan  Type of Anesthesia, risks & benefits discussed:  Anesthesia Type:  MAC  Patient's Preference:   Intra-op Monitoring Plan: standard ASA monitors  Intra-op Monitoring Plan Comments:   Post Op Pain Control Plan:   Post Op Pain Control Plan Comments:   Induction:   IV  Beta Blocker:  Patient is not currently on a Beta-Blocker (No further documentation required).       Informed Consent: Patient understands risks and agrees with Anesthesia plan.  Questions answered. Anesthesia  consent signed with patient.  ASA Score: 2     Day of Surgery Review of History & Physical: I have interviewed and examined the patient. I have reviewed the patient's H&P dated: 11/06/2017. There are no significant changes.          Ready For Surgery From Anesthesia Perspective.

## 2017-11-06 NOTE — TRANSFER OF CARE
"Anesthesia Transfer of Care Note    Patient: Luiz Ybarra    Procedure(s) Performed: Procedure(s) (LRB):  COLONOSCOPY (N/A)    Patient location: GI    Anesthesia Type: MAC    Transport from OR: Transported from OR on room air with adequate spontaneous ventilation    Post pain: adequate analgesia    Post assessment: no apparent anesthetic complications and tolerated procedure well    Post vital signs: stable    Level of consciousness: awake, alert and oriented    Nausea/Vomiting: no nausea/vomiting    Complications: none    Transfer of care protocol was followed      Last vitals:   Visit Vitals  /68 (BP Location: Left arm, Patient Position: Lying)   Pulse 66   Temp 36.6 °C (97.8 °F) (Oral)   Resp 16   Ht 5' 9.25" (1.759 m)   Wt 95.3 kg (210 lb)   SpO2 97%   BMI 30.79 kg/m²     "

## 2017-11-06 NOTE — PLAN OF CARE
Dr Colón came to bedside and discussed findings. NO N/V,  no abdominal pain, no GI bleeding, and vitals stable.  Pt discharged from unit.

## 2017-11-06 NOTE — ANESTHESIA POSTPROCEDURE EVALUATION
"Anesthesia Post Evaluation    Patient: Luiz Ybarra    Procedure(s) Performed: Procedure(s) (LRB):  COLONOSCOPY (N/A)    Final Anesthesia Type: MAC  Patient location during evaluation: GI PACU  Patient participation: Yes- Able to Participate  Level of consciousness: awake and alert and oriented  Post-procedure vital signs: reviewed and stable  Pain management: adequate  Airway patency: patent  PONV status at discharge: No PONV  Anesthetic complications: no      Cardiovascular status: hemodynamically stable  Respiratory status: unassisted, room air and spontaneous ventilation  Hydration status: euvolemic  Follow-up not needed.        Visit Vitals  /68 (BP Location: Left arm, Patient Position: Lying)   Pulse 66   Temp 36.6 °C (97.8 °F) (Oral)   Resp 16   Ht 5' 9.25" (1.759 m)   Wt 95.3 kg (210 lb)   SpO2 97%   BMI 30.79 kg/m²       Pain/Bernie Score: Pain Assessment Performed: Yes (11/6/2017  7:44 AM)  Presence of Pain: denies (11/6/2017  7:44 AM)      "

## 2017-11-06 NOTE — ANESTHESIA RELEASE NOTE
"Anesthesia Release from PACU Note    Patient: Luiz Ybarra    Procedure(s) Performed: Procedure(s) (LRB):  COLONOSCOPY (N/A)    Anesthesia type: MAC    Post pain: Adequate analgesia    Post assessment: no apparent anesthetic complications and tolerated procedure well    Last Vitals:   Visit Vitals  /68 (BP Location: Left arm, Patient Position: Lying)   Pulse 66   Temp 36.6 °C (97.8 °F) (Oral)   Resp 16   Ht 5' 9.25" (1.759 m)   Wt 95.3 kg (210 lb)   SpO2 97%   BMI 30.79 kg/m²       Post vital signs: stable    Level of consciousness: awake, alert  and oriented    Nausea/Vomiting: no nausea/no vomiting    Complications: none    Airway Patency: patent    Respiratory: unassisted, spontaneous ventilation, room air    Cardiovascular: stable and blood pressure at baseline    Hydration: euvolemic  "

## 2017-11-07 VITALS
WEIGHT: 210 LBS | TEMPERATURE: 98 F | HEART RATE: 66 BPM | BODY MASS INDEX: 31.1 KG/M2 | DIASTOLIC BLOOD PRESSURE: 74 MMHG | SYSTOLIC BLOOD PRESSURE: 125 MMHG | HEIGHT: 69 IN | RESPIRATION RATE: 18 BRPM | OXYGEN SATURATION: 100 %

## 2018-02-18 DIAGNOSIS — G89.29 CHRONIC TOE PAIN, RIGHT FOOT: ICD-10-CM

## 2018-02-18 DIAGNOSIS — M79.674 CHRONIC TOE PAIN, RIGHT FOOT: ICD-10-CM

## 2018-02-19 RX ORDER — IBUPROFEN 800 MG/1
TABLET ORAL
Qty: 20 TABLET | Refills: 0 | OUTPATIENT
Start: 2018-02-19

## 2018-04-23 RX ORDER — FINASTERIDE 1 MG/1
TABLET, FILM COATED ORAL
Qty: 30 TABLET | Refills: 6 | Status: SHIPPED | OUTPATIENT
Start: 2018-04-23 | End: 2019-02-19 | Stop reason: SDUPTHER

## 2018-04-24 ENCOUNTER — TELEPHONE (OUTPATIENT)
Dept: URGENT CARE | Facility: CLINIC | Age: 38
End: 2018-04-24

## 2018-04-24 ENCOUNTER — HOSPITAL ENCOUNTER (OUTPATIENT)
Dept: RADIOLOGY | Facility: HOSPITAL | Age: 38
Discharge: HOME | End: 2018-04-24
Attending: PHYSICIAN ASSISTANT
Payer: COMMERCIAL

## 2018-04-24 ENCOUNTER — OFFICE VISIT (OUTPATIENT)
Dept: URGENT CARE | Facility: CLINIC | Age: 38
End: 2018-04-24
Payer: COMMERCIAL

## 2018-04-24 VITALS
RESPIRATION RATE: 16 BRPM | HEIGHT: 70 IN | HEART RATE: 68 BPM | WEIGHT: 220.56 LBS | BODY MASS INDEX: 31.57 KG/M2 | TEMPERATURE: 97 F | SYSTOLIC BLOOD PRESSURE: 114 MMHG | DIASTOLIC BLOOD PRESSURE: 86 MMHG

## 2018-04-24 DIAGNOSIS — M54.2 NECK PAIN ON RIGHT SIDE: Primary | ICD-10-CM

## 2018-04-24 DIAGNOSIS — M54.2 NECK PAIN ON RIGHT SIDE: ICD-10-CM

## 2018-04-24 PROCEDURE — 99999 PR PBB SHADOW E&M-EST. PATIENT-LVL III: CPT | Mod: PBBFAC,,, | Performed by: PHYSICIAN ASSISTANT

## 2018-04-24 PROCEDURE — 72050 X-RAY EXAM NECK SPINE 4/5VWS: CPT | Mod: TC,FY,PO

## 2018-04-24 PROCEDURE — 72050 X-RAY EXAM NECK SPINE 4/5VWS: CPT | Mod: 26,,, | Performed by: RADIOLOGY

## 2018-04-24 PROCEDURE — 99214 OFFICE O/P EST MOD 30 MIN: CPT | Mod: S$GLB,,, | Performed by: PHYSICIAN ASSISTANT

## 2018-04-24 RX ORDER — CYCLOBENZAPRINE HCL 10 MG
10 TABLET ORAL EVERY 8 HOURS PRN
Qty: 30 TABLET | Refills: 0 | Status: SHIPPED | OUTPATIENT
Start: 2018-04-24 | End: 2018-05-04

## 2018-04-24 RX ORDER — PREDNISONE 20 MG/1
40 TABLET ORAL DAILY
Qty: 10 TABLET | Refills: 0 | Status: SHIPPED | OUTPATIENT
Start: 2018-04-24 | End: 2018-04-29

## 2018-04-24 NOTE — PROGRESS NOTES
"Subjective:       Patient ID: Luiz Ybarra is a 38 y.o. male.    Chief Complaint: Neck Pain (x one week)    Neck Pain    This is a recurrent (this has happened before but it has been a long time ago) problem. The current episode started in the past 7 days (for about 1 week has had severe right sided neck pain, no trauma that patient can recall, always seems to have decreased motion to right). The problem occurs constantly (pain wakes him up at night). The problem has been unchanged. Associated with: movement. The pain is present in the right side. The quality of the pain is described as stabbing and aching. The pain is severe ("it's almost bringing me to tears"). The pain is same all the time. Pertinent negatives include no chest pain, fever, headaches, leg pain, numbness, paresis, photophobia, tingling, trouble swallowing, visual change or weakness. Treatments tried: ibuprofen 600 last night. The treatment provided mild relief.     Review of Systems   Constitutional: Negative for chills, fatigue and fever.   HENT: Negative for rhinorrhea, sore throat and trouble swallowing.    Eyes: Negative for photophobia, pain and redness.   Respiratory: Negative for cough and shortness of breath.    Cardiovascular: Negative for chest pain and leg swelling.   Gastrointestinal: Negative for abdominal pain, nausea and vomiting.   Musculoskeletal: Positive for neck pain and neck stiffness. Negative for myalgias.   Skin: Negative for rash.   Neurological: Negative for tingling, weakness, numbness and headaches.       Objective:      /86 (BP Location: Right arm, Patient Position: Sitting)   Pulse 68   Temp 97 °F (36.1 °C) (Tympanic)   Resp 16   Ht 5' 10" (1.778 m)   Wt 100 kg (220 lb 9.1 oz)   BMI 31.65 kg/m²   Physical Exam   Constitutional: He is oriented to person, place, and time. He appears well-developed and well-nourished. No distress.   HENT:   Head: Normocephalic and atraumatic.   Right Ear: External ear " normal.   Left Ear: External ear normal.   Nose: Nose normal.   Eyes: Conjunctivae and EOM are normal. Right eye exhibits no discharge. Left eye exhibits no discharge.   Neck: Normal range of motion. Neck supple.   Musculoskeletal:        Cervical back: He exhibits decreased range of motion, tenderness (over right paraspinous) and pain. He exhibits no bony tenderness and no edema.   Severely limited right lateral flexion secondary to pain . +paraspinous muscle tightness. Strength C5-8 5/5. Positive Spurling test. Positive relief with manual distraction.  Sensation intact to light touch.            Neurological: He is alert and oriented to person, place, and time. He has normal reflexes. He displays normal reflexes. Coordination normal.   Skin: Skin is warm and dry. No rash noted. He is not diaphoretic. No erythema.   Vitals reviewed.      Assessment:       1. Neck pain on right side        Plan:       Neck pain on right side  -     predniSONE (DELTASONE) 20 MG tablet; Take 2 tablets (40 mg total) by mouth once daily.  Dispense: 10 tablet; Refill: 0  -     cyclobenzaprine (FLEXERIL) 10 MG tablet; Take 1 tablet (10 mg total) by mouth every 8 (eight) hours as needed for Muscle spasms.  Dispense: 30 tablet; Refill: 0  -     X-Ray Cervical Spine Complete 5 view; Future; Expected date: 04/24/2018    Patient denies radicular symptoms and his localized pain suggests muscle strain, however he has a positive Spurling test and relief with manual distraction.  Suspect impingement of nerve root by disc. No weakness/numbness. Will treat with short course steroids and muscle relaxant. XR today to evaluate intervertebral height. Advised patient if not improving over the next 3-4 days to follow up with PCP to discuss if further evaluation is needed. Return earlier for any neurologic symptoms.      -  During the first two days after injury, apply an ice pack to the painful area for 20 minutes every 2-4 hours. This will reduce  swelling and pain. Heat (hot shower, hot bath or heating pad) also works well for muscle spasm.     -  Do russell stretches (side to side, chin to chest) several times per day.         - Maintain good posture. For example, sit straight with the shoulders held back, drive with the arms slightly shrugged (eg, on arm rests), and avoid carrying bags or gear with straps over the shoulders.       - Sleep position is important. The head and neck should be aligned with the body, best accomplished with a small pillow under the neck. A helpful sleep position is to lie flat on your back with thighs elevated on pillows, thereby flattening the long spinal muscles.       - If you have a sedentary job, make an effort to transition to standing and walking frequently, perform neck range of motion exercises, and maintain a neutral posture whenever possible.       - Follow up with Primary Care Provider if neck pain does not progressively improve or worsens in any way or if pain is not gradually relieved by treatment.       - Seek immediate emergency care if you have severely worsening pain, arm weakness, or numbness/tingling in the arms.        Heather Trant PA-C [facility]

## 2018-04-24 NOTE — TELEPHONE ENCOUNTER
----- Message from Frank Bahena sent at 4/24/2018 12:35 PM CDT -----  Contact: Pt  Please give pt a call at ..[phone] (home) he was returning the nurse call.

## 2018-04-24 NOTE — PATIENT INSTRUCTIONS
Understanding Neck Problems       If you suffer from neck pain, youre not alone. Many people have neck pain at some point in their lives. Problems such as poor posture, injury, and wear and tear can lead to neck pain. Your healthcare provider will work with you to find the treatment thats best for your neck.  Types of neck problems    The following problems can cause pain or injury in your neck:  · Strains and sprains: Strains (stretched or torn muscles) and sprains (stretched or torn ligaments) can cause neck pain. Strains and sprains can occur during an accident, or when you overuse your neck through repetitive motion. They can also cause your muscles and ligaments to become inflamed (swollen and painful).  · Whiplash and other injuries: Whiplash can result when an impact throws your head, forcing your neck too far forward, then too far backward. When combined, the two motions can cause a painful injury to different parts of your neck, such as muscles, ligaments, or joints. The most common cause of whiplash is a car accident. But it can also happen during a fall or sports injury.  · Weakened disks: A simple action, such as a sneeze or a cough, can cause one of your disks to bulge or rupture (herniate). A herniated disk can put pressure on your nerve and cause pain. Over time, disks can also thin out (degenerate). Flattened disks dont cushion vertebrae well and can cause vertebrae to rub together. Also, there is less space for the nerves. This can pinch nerves and cause pain.  · Weakened joints: Aging and injury can cause joints to slowly degenerate. Thinned joints can also cause vertebrae to rub together. This can cause abnormal growths of bone (bone spurs) to form on vertebrae. Bone spurs put pressure on nerves, causing pain.  Common symptoms  If you have a neck problem, you may have one or more of the following symptoms:  · Muscle tension and spasm: You may not be able to move your neck, arms, or shoulders  comfortably if you have muscle tension or stiffness in your neck. If your symptoms arent relieved, you may experience muscle spasms, or knots of contracted tissue (trigger points) in areas of your neck and shoulders.  · Aches and pains: Dull aches in your head or neck, sharp pains, and swelling of the soft tissue of your neck and shoulders are common symptoms. If theres pressure on the nerves in your neck, you may feel pain in your arms or hands.  · Numbness or weakness: If you injure the nerves in your neck, you may have numbness, tingling, or weakness in your shoulders, arms, or hands. These symptoms arise when disks or bone spurs press on the nerves in your neck. Severe disease can also affect your legs.  Date Last Reviewed: 8/23/2015  © 3689-7344 [employer]. [address] [place] [address]. All rights reserved. This information is not intended as a substitute for professional medical care. Always follow your healthcare professional's instructions.      -  During the first two days after injury, apply an ice pack to the painful area for 20 minutes every 2-4 hours. This will reduce swelling and pain. Heat (hot shower, hot bath or heating pad) also works well for muscle spasm.     -  Do russell stretches (side to side, chin to chest) several times per day.         - Maintain good posture. For example, sit straight with the shoulders held back, drive with the arms slightly shrugged (eg, on arm rests), and avoid carrying bags or gear with straps over the shoulders.       - Sleep position is important. The head and neck should be aligned with the body, best accomplished with a small pillow under the neck. A helpful sleep position is to lie flat on your back with thighs elevated on pillows, thereby flattening the long spinal muscles.       - If you have a sedentary job, make an effort to transition to standing and walking frequently, perform neck range of motion exercises, and maintain a  neutral posture whenever possible.       - Follow up with Primary Care Provider if neck pain does not progressively improve or worsens in any way or if pain is not gradually relieved by treatment.       - Seek immediate emergency care if you have severely worsening pain, arm weakness, or numbness/tingling in the arms.

## 2018-04-24 NOTE — TELEPHONE ENCOUNTER
Informed patient of x ray results, he expressed understanding and has no further questions at this time.

## 2018-07-23 ENCOUNTER — HOSPITAL ENCOUNTER (OUTPATIENT)
Dept: RADIOLOGY | Facility: HOSPITAL | Age: 38
Discharge: HOME | End: 2018-07-23
Attending: PHYSICIAN ASSISTANT
Payer: COMMERCIAL

## 2018-07-23 ENCOUNTER — OFFICE VISIT (OUTPATIENT)
Dept: INTERNAL MEDICINE | Facility: CLINIC | Age: 38
End: 2018-07-23
Payer: COMMERCIAL

## 2018-07-23 VITALS
OXYGEN SATURATION: 98 % | HEIGHT: 70 IN | BODY MASS INDEX: 32.38 KG/M2 | SYSTOLIC BLOOD PRESSURE: 120 MMHG | DIASTOLIC BLOOD PRESSURE: 82 MMHG | TEMPERATURE: 97 F | HEART RATE: 72 BPM | WEIGHT: 226.19 LBS

## 2018-07-23 DIAGNOSIS — N20.0 NEPHROLITHIASIS: Primary | ICD-10-CM

## 2018-07-23 DIAGNOSIS — R10.9 FLANK PAIN: ICD-10-CM

## 2018-07-23 PROCEDURE — 99214 OFFICE O/P EST MOD 30 MIN: CPT | Mod: 25,S$GLB,, | Performed by: PHYSICIAN ASSISTANT

## 2018-07-23 PROCEDURE — 96372 THER/PROPH/DIAG INJ SC/IM: CPT | Mod: S$GLB,,, | Performed by: PHYSICIAN ASSISTANT

## 2018-07-23 PROCEDURE — 74176 CT ABD & PELVIS W/O CONTRAST: CPT | Mod: TC,PO

## 2018-07-23 PROCEDURE — 74176 CT ABD & PELVIS W/O CONTRAST: CPT | Mod: 26,,, | Performed by: RADIOLOGY

## 2018-07-23 PROCEDURE — 99999 PR PBB SHADOW E&M-EST. PATIENT-LVL IV: CPT | Mod: PBBFAC,,, | Performed by: PHYSICIAN ASSISTANT

## 2018-07-23 PROCEDURE — 3008F BODY MASS INDEX DOCD: CPT | Mod: CPTII,S$GLB,, | Performed by: PHYSICIAN ASSISTANT

## 2018-07-23 RX ORDER — KETOROLAC TROMETHAMINE 10 MG/1
10 TABLET, FILM COATED ORAL EVERY 6 HOURS
Qty: 20 TABLET | Refills: 0 | Status: SHIPPED | OUTPATIENT
Start: 2018-07-23 | End: 2018-07-28

## 2018-07-23 RX ORDER — HYDROCODONE BITARTRATE AND ACETAMINOPHEN 5; 325 MG/1; MG/1
1 TABLET ORAL EVERY 8 HOURS PRN
Qty: 21 TABLET | Refills: 0 | Status: SHIPPED | OUTPATIENT
Start: 2018-07-23 | End: 2018-07-30

## 2018-07-23 RX ORDER — TAMSULOSIN HYDROCHLORIDE 0.4 MG/1
0.4 CAPSULE ORAL DAILY
Qty: 7 CAPSULE | Refills: 0 | Status: SHIPPED | OUTPATIENT
Start: 2018-07-23 | End: 2018-07-24 | Stop reason: SDUPTHER

## 2018-07-23 RX ORDER — KETOROLAC TROMETHAMINE 30 MG/ML
60 INJECTION, SOLUTION INTRAMUSCULAR; INTRAVENOUS
Status: COMPLETED | OUTPATIENT
Start: 2018-07-23 | End: 2018-07-23

## 2018-07-23 RX ADMIN — KETOROLAC TROMETHAMINE 60 MG: 30 INJECTION, SOLUTION INTRAMUSCULAR; INTRAVENOUS at 10:07

## 2018-07-23 NOTE — PROGRESS NOTES
Subjective:      Patient ID: Luiz Ybarra is a 38 y.o. male.    Chief Complaint: Flank Pain (right side) and Urinary Symptoms (unable to empty bladder)    Flank Pain   This is a new problem. The current episode started today. The problem occurs constantly. The problem has been gradually worsening since onset. The pain is present in the costovertebral angle. The quality of the pain is described as stabbing. The pain does not radiate. The pain is severe. Associated symptoms include dysuria. Pertinent negatives include no abdominal pain, bladder incontinence, bowel incontinence, chest pain, fever, headaches, leg pain, numbness, paresis, paresthesias, pelvic pain, perianal numbness, tingling, weakness or weight loss. (Urinary frequency, inability to urinate, reduced urinary stream, orange urine) He has tried nothing for the symptoms.       Review of Systems   Constitutional: Negative for activity change, appetite change, chills, diaphoresis, fatigue, fever, unexpected weight change and weight loss.   HENT: Negative.  Negative for congestion, hearing loss, postnasal drip, rhinorrhea, sore throat, trouble swallowing and voice change.    Eyes: Negative.  Negative for visual disturbance.   Respiratory: Negative.  Negative for cough, choking, chest tightness and shortness of breath.    Cardiovascular: Negative for chest pain, palpitations and leg swelling.   Gastrointestinal: Negative for abdominal distention, abdominal pain, blood in stool, bowel incontinence, constipation, diarrhea, nausea and vomiting.   Endocrine: Negative for cold intolerance, heat intolerance, polydipsia and polyuria.   Genitourinary: Positive for decreased urine volume, difficulty urinating, dysuria, flank pain, frequency, hematuria and urgency. Negative for bladder incontinence, discharge, enuresis, genital sores, pelvic pain, penile pain, penile swelling, scrotal swelling and testicular pain.   Musculoskeletal: Negative for arthralgias,  "back pain, gait problem, joint swelling and myalgias.   Skin: Negative for color change, pallor, rash and wound.   Neurological: Negative for dizziness, tingling, tremors, weakness, light-headedness, numbness, headaches and paresthesias.   Hematological: Negative for adenopathy.   Psychiatric/Behavioral: Negative for behavioral problems, confusion, self-injury, sleep disturbance and suicidal ideas. The patient is not nervous/anxious.      Objective:   /82 (BP Location: Right arm, Patient Position: Sitting)   Pulse 72   Temp 97.1 °F (36.2 °C) (Tympanic)   Ht 5' 10" (1.778 m)   Wt 102.6 kg (226 lb 3.1 oz)   SpO2 98%   BMI 32.46 kg/m²     Physical Exam   Constitutional: He is oriented to person, place, and time. He appears well-developed and well-nourished. He appears distressed (pt uncomfortable due to pain).   HENT:   Head: Normocephalic and atraumatic.   Eyes: Conjunctivae and EOM are normal.   Cardiovascular: Normal rate, regular rhythm and normal heart sounds.  Exam reveals no gallop and no friction rub.    No murmur heard.  Pulmonary/Chest: Effort normal and breath sounds normal. No respiratory distress. He has no wheezes. He has no rales. He exhibits no tenderness.   Abdominal: Soft. Normal appearance and bowel sounds are normal. He exhibits no distension. There is no tenderness. There is CVA tenderness (right). There is no rigidity, no rebound and no guarding.   Musculoskeletal: Normal range of motion.   Neurological: He is alert and oriented to person, place, and time.   Skin: Skin is warm and dry. No rash noted. He is not diaphoretic.   Psychiatric: He has a normal mood and affect. His behavior is normal. Judgment and thought content normal.   Vitals reviewed.    Lab Visit on 07/23/2018   Component Date Value Ref Range Status    Specimen UA 07/23/2018 Urine, Clean Catch   Final    Color, UA 07/23/2018 Yellow  Yellow, Straw, Trina Final    Appearance, UA 07/23/2018 Hazy* Clear Final    pH, UA " 07/23/2018 6.0  5.0 - 8.0 Final    Specific Gravity, UA 07/23/2018 1.025  1.005 - 1.030 Final    Protein, UA 07/23/2018 Negative  Negative Final    Comment: Recommend a 24 hour urine protein or a urine   protein/creatinine ratio if globulin induced proteinuria is  clinically suspected.      Glucose, UA 07/23/2018 Negative  Negative Final    Ketones, UA 07/23/2018 Negative  Negative Final    Bilirubin (UA) 07/23/2018 Negative  Negative Final    Occult Blood UA 07/23/2018 3+* Negative Final    Nitrite, UA 07/23/2018 Negative  Negative Final    Leukocytes, UA 07/23/2018 Negative  Negative Final    RBC, UA 07/23/2018 25* 0 - 4 /hpf Final    Microscopic Comment 07/23/2018 SEE COMMENT   Final    Comment: Other formed elements not mentioned in the report are not   present in the microscopic examination.        CT Renal Stone Study ABD Pelvis WO  Narrative: EXAMINATION:  CT RENAL STONE STUDY ABD PELVIS WO    CLINICAL HISTORY:  Flank pain, stone disease suspected; Unspecified abdominal pain    TECHNIQUE:  Low dose axial images, sagittal and coronal reformations were obtained from the lung bases to the pubic symphysis.  Contrast was not administered.    COMPARISON:  09/19/2017.    FINDINGS:  There is mild right-sided hydronephrosis and diffuse hydroureter with a 4 mm calculus identified at the right UVJ.  Some associated perinephric and periureteric fat stranding noted.  There is a punctate 1 mm calculus identified in the mid to upper pole of the right kidney.  No left renal or ureteral calculi noted.  There is a subcentimeter hypodensity likely a cyst in the lower pole of the left kidney.  No gross abnormality of the decompressed urinary bladder.    The liver, gallbladder, spleen, pancreas, and adrenal glands are unremarkable.    There are scattered diverticuli in the descending and sigmoid colon.  No acute bowel inflammation or obstruction.  No intraperitoneal free air or fluid.    There are scattered small  subcentimeter short axis lymph nodes in the mesentery and periaortic retroperitoneum.  No definite pathologic lymphadenopathy.  Some low-grade hazy opacity in the central mesentery unchanged from prior.  The aorta tapers normally.    There is no evidence of mass or pathologic lymphadenopathy in the pelvis.    The lung bases are clear.    There is no acute osseous abnormality.  Impression: Mild right-sided hydroureteronephrosis secondary to a 4 mm calculus positioned at the right UVJ.    Electronically signed by: CANDY Garcia MD  Date:    07/23/2018  Time:    14:34      Assessment:     1. Flank pain      Plan:   Flank pain  -     ketorolac injection 60 mg; Inject 2 mLs (60 mg total) into the muscle one time.  -     CT Renal Stone Study ABD Pelvis WO; Future; Expected date: 07/23/2018  -     Urinalysis; Future  -     tamsulosin (FLOMAX) 0.4 mg Cap; Take 1 capsule (0.4 mg total) by mouth once daily for 7 days  Dispense: 7 capsule; Refill: 0    -recommend follow up with urologist. Pt scheduled apt with urologist with [facility] for today.   -increase fluids    Follow-up if symptoms worsen or fail to improve.

## 2018-07-23 NOTE — PATIENT INSTRUCTIONS
Kidney Stone (with Pain)    The sharp cramping pain on either side of your lower back and nausea/vomiting that you have are because of a small stone that has formed in the kidney. It is now passing down a narrow tube (ureter) on its way to your bladder. Once the stone reaches your bladder, the pain will often stop. But it may come back as the stone continues to pass out of the bladder and through the urethra. The stone may pass in your urine stream in one piece. The size may be 1/16 inch to 1/4 inch (1 mm to 6 mm). Or, the stone may break up into shawn fragments that you may not even notice.  Once you have had a kidney stone, you are at risk of getting another one in the future. There are 4 types of kidney stones. Eighty percent are calcium stones--mostly calcium oxalate but also some with calcium phosphate. The other 3 types include uric acid stones, struvite stones (from a preceding infection), and rarely, cystine stones.  Most stones will pass on their own, but may take from a few hours to a few days. Sometimes the stone is too large to pass by itself. In that case, the healthcare provider will need to use other ways to remove the stone. These techniques include:  · Lithotripsy. This uses ultrasound waves to break up the stone.  · Ureteroscopy. This pushes a basket-like instrument through the urethra and bladder and into the ureter to pull out the stone.  · Various types of direct surgery through the skin  Home care  The following are general care guidelines:  · Drink plenty of fluids. This means at least 12, 8-ounce glasses of fluid--mostly water--a day.  · Each time you urinate, do so in a jar. Pour the urine from the jar through the strainer and into the toilet. Continue doing this until 24 hours after your pain stops. By then, if there was a kidney stone, it should pass from your bladder. Some stones dissolve into sand-like particles and pass right through the strainer. In that case, you wont ever see a  stone.  · Save any stone that you find in the strainer and bring it to your healthcare provider to look at. It may be possible to stop certain types of stones from forming. For this reason, it is important to know what kind of stone you have.  · Try to stay as active as possible. This will help the stone pass. Don't stay in bed unless your pain keeps you from getting up. You may notice a red, pink, or brown color to your urine. This is normal while passing a kidney stone.  · If you develop pain, you may take ibuprofen or naproxen for pain, unless another medicine was prescribed. If you have chronic liver or kidney disease, talk with your healthcare provider before taking these medicines. Also talk with your provider if you've had a stomach ulcer or GI bleeding.  Preventing stones  Each year for the next 5 to 7 years, you are at risk that a new stone will form. Your risk is a 50% chance over this time period. The risk is higher if you have a family history of kidney stones or have certain chronic illnesses like hypertension, obesity, or diabetes. But you can make changes to your lifestyle and diet that can lower your risk for another stone.  Most kidney stones are made of calcium. The following is advice for preventing another calcium stone. If you dont know the type of stone you have, follow this advice until the cause of your stone is found.  Things that help:  · The most important thing you can do is to drink plenty of fluids each day. See home care above.   · Eat foods that contain phytates. These include wheat, rice, rye, barley, and beans. Phytates are substances that may lower your risk for any type of stone to form.  · Eat more fruits and vegetables. Choose those that are high in potassium.  · Eat foods high in natural citrate like fruit and low-sugar fruit juices.  · Having too little calcium in your diet can put you at risk for calcium kidney stones. Eat a normal amount of calcium in your diet and  talk with your healthcare provider if you are taking calcium supplements. Cutting back on your calcium intake may raise your risk. New research shows that eating calcium-rich and oxalate-rich foods together lowers your risk for stones by binding the minerals in the stomach and intestines before they can reach the kidneys.    · Limit salt intake to 2 grams (1 teaspoon) per day. Use limited amounts when cooking, and dont add salt at the table. Processed and canned foods are usually high in salt.   · Spinach, rhubarb, peanuts, cashews, almonds, grapefruit, and grapefruit juice are all high oxalate foods. You should limit how much of these you eat. Or eat them with calcium-rich foods. These include dairy products, dark leafy greens, soy products, and calcium-enriched foods.  · Reducing the amount of animal meat and high protein foods in your diet may lower your risk for uric acid stones.  · Avoid excess sugar (sucrose) and fructose (sweetener in many soft drinks) in your diet.   · If you take vitamin C as a supplement, don't take more than 1,000 mg a day.  · A dietitian or your healthcare provider can give you information about changes in your diet that will help prevent more kidney stones from forming.  Follow-up care  Follow up with your healthcare provider, or as advised, if the pain lasts more than 48 hours. Talk with your provider about urine and blood tests to find out the cause of your stone. If you had an X-ray, CT scan, or other diagnostic test, you will be told of any new findings that may affect your care.  Call 911  Call 911 if you have any of these:  · Weakness, dizziness, or fainting  When to seek medical advice  Call your healthcare provider right away if any of these occur:  · Pain that is not controlled by the medicine given  · Repeated vomiting or unable to keep down fluids  · Fever of 100.4ºF (38ºC) or higher, or as directed by your healthcare provider  · Passage of solid red or brown urine (can't  see through it) or urine with lots of blood clots  · Foul-smelling or cloudy urine  · Unable to pass urine for 8 hours and increasing bladder pressure  Date Last Reviewed: 10/1/2016  © 4072-2336 [employer]. [address] [place] [address], [place] [address]. All rights reserved. This information is not intended as a substitute for professional medical care. Always follow your healthcare professional's instructions.        Kidney Stone (Urine)  Does this test have other names?  Urine stone risk profile, 24-hour collection  What is this test?  This test checks your urine for chemicals that might cause your body to form kidney stones. The test also looks for blood in your urine, which can be a symptom of kidney stones.  Kidney stones are hard masses of minerals and salts that can form in your kidneys. They can be as small as a grain of sand or more than an inch in diameter. Usually theses stones or crystals pass through your body when you urinate. But sometimes they can get stuck in your urinary tract and cause a lot of pain.  Why do I need this test?  You may need this test if your healthcare provider suspects that you have kidney stones. Symptoms of kidney stones include:  · Pain in your lower belly (abdomen) or side  · Nausea and vomiting  · Sudden, strong urge to urinate  · Pain when urinating  · Blood in your urine  You may also have this test if you had a kidney stone or you are being treated for kidney stones. If you have had a kidney stone or any treatments for a kidney stone, you should wait 1 to 2 months, or until you have completely recovered, before having this test.  You will need to repeat the test at least twice so your healthcare provider can compare the results.  What other tests might I have along with this test?  Your healthcare provider may also order imaging tests. These include an ultrasound, CT scan and, a special type of X-ray (pyelogram) that uses a dye to look for kidney stones.  Your  provider is also likely to order blood tests, to look for calcium, phosphate, uric acid, oxalate, and citrate. These are some of the chemicals that are most likely to cause your body to form kidney stones.  What do my test results mean?  Many things may affect your lab test results. These include the method each lab uses to do the test. Even if your test results are different from the normal value, you may not have a problem. To learn what the results mean for you, talk with your healthcare provider.  The results will show whether your urine has high or low levels of the chemicals that are most likely to cause stones to form. These chemicals are calcium, phosphate, uric acid, oxalate, and citrate.  If your levels are not normal, it may mean that you have a kidney stone or stones.  Abnormal levels may also mean that you have another kidney disorder, such as a urinary tract infection.  How is this test done?  This test requires a 24-hour urine sample. For this sample, you must collect all of your urine for 24 hours. Empty your bladder completely first in the morning without collecting it and note the time. Then collect your urine every time you go to the bathroom over the next 24 hours. As you collect the urine, store it in the refrigerator.  Does this test pose any risks?  This test does not pose any known risks.  What might affect my test results?  Having this test too soon after treatment for a previous kidney stone can affect your results. You should wait several months after treatment before having this test.  How do I get ready for this test?  You don't need to prepare for this test.    © 8614-7859 [employer]. [address] [address], [place] [address]. All rights reserved. This information is not intended as a substitute for professional medical care. Always follow your healthcare professional's instructions.        Understanding Kidney Stones  Your kidneys are bean-shaped organs. They help filter  extra salts, waste, and water from your body. You need to drink enough water every day to help flush the extra salts into your urine.     What are kidney stones?  Kidney stones are made up of chemical crystals that separate out from urine. These crystals clump together to make stones. They form in the calyx of the kidney. They may stay in the kidney or move into the urinary tract.   Why kidney stones form  Kidneys form stones for many reasons. If you dont drink enough water, for instance, you wont have enough urine to dilute chemicals. Then the chemicals may form crystals, which can develop into stones. Here are some reasons why kidney stones form:  · Fluid loss (dehydration). This can concentrate urine, causing stones to form.  · Certain foods. Some foods contain large amounts of the chemicals that sometimes crystallize into stones. Eating foods that contain a lot of meat or salt can lead to more kidney stones.  · Kidney infections. These infections foster stones by slowing urine flow or changing the acid balance of your urine.  · Family history. If family members have had kidney stones, youre more likely to have them, too.  · A lack of certain substances in your urine. Some substances can help protect you from forming stones. If you dont have enough of these in your urine, stone formation can increase.  Where stones form  Stones begin in the cup-shaped part of the kidney (calyx). Some stay in the calyx and grow. Others move into the kidney, pelvis, or into the ureter. There they can lodge, block the flow of urine, and cause pain.  Symptoms  Many stones cause sudden and severe pain and bloody urine. Others cause nausea or frequent, burning urination. Symptoms often depend on your stones size and location. Fever may indicate a serious infection. Call your healthcare provider right away if you develop a fever.  Date Last Reviewed: 1/1/2017  © 5894-9094 [employer]. [address] [place] [address], [facility]  [place] [address]. All rights reserved. This information is not intended as a substitute for professional medical care. Always follow your healthcare professional's instructions.

## 2019-01-22 ENCOUNTER — OFFICE VISIT (OUTPATIENT)
Dept: OPHTHALMOLOGY | Facility: CLINIC | Age: 39
End: 2019-01-22
Payer: COMMERCIAL

## 2019-01-22 DIAGNOSIS — H52.03 HYPEROPIA WITH ASTIGMATISM, BILATERAL: Primary | ICD-10-CM

## 2019-01-22 DIAGNOSIS — H52.203 HYPEROPIA WITH ASTIGMATISM, BILATERAL: Primary | ICD-10-CM

## 2019-01-22 PROCEDURE — 92004 COMPRE OPH EXAM NEW PT 1/>: CPT | Mod: S$GLB,,, | Performed by: OPTOMETRIST

## 2019-01-22 PROCEDURE — 99999 PR PBB SHADOW E&M-EST. PATIENT-LVL I: CPT | Mod: PBBFAC,,, | Performed by: OPTOMETRIST

## 2019-01-22 PROCEDURE — 99999 PR PBB SHADOW E&M-EST. PATIENT-LVL I: ICD-10-PCS | Mod: PBBFAC,,, | Performed by: OPTOMETRIST

## 2019-01-22 PROCEDURE — 92015 PR REFRACTION: ICD-10-PCS | Mod: S$GLB,,, | Performed by: OPTOMETRIST

## 2019-01-22 PROCEDURE — 92015 DETERMINE REFRACTIVE STATE: CPT | Mod: S$GLB,,, | Performed by: OPTOMETRIST

## 2019-01-22 PROCEDURE — 92004 PR EYE EXAM, NEW PATIENT,COMPREHESV: ICD-10-PCS | Mod: S$GLB,,, | Performed by: OPTOMETRIST

## 2019-01-22 NOTE — PROGRESS NOTES
HPI     HPI    NP    Any vision changes :Vision is getting blurry at computer distance and near  Eye pain: no  Other ocular symptoms: none    Do you wear currently wear glasses or contacts? no    Interested in contacts today? no    Do you plan on getting new glasses today? Yes, if needed  Family history of glaucoma from both grandparents    Last edited by Alison Jo on 1/22/2019  8:40 AM. (History)            Assessment /Plan     For exam results, see Encounter Report.    Hyperopia with astigmatism, bilateral      Eyeglass Final Rx     Eyeglass Final Rx       Sphere Cylinder Axis    Right +0.75 +0.50 180    Left +0.25 +0.75 180    Expiration Date:  1/23/2020              RTC 1 yr for undilated eye exam or PRN if any problems.   Discussed above and answered questions.

## 2019-02-18 ENCOUNTER — PATIENT OUTREACH (OUTPATIENT)
Dept: ADMINISTRATIVE | Facility: HOSPITAL | Age: 39
End: 2019-02-18

## 2019-02-18 DIAGNOSIS — Z00.00 BLOOD TESTS FOR ROUTINE GENERAL PHYSICAL EXAMINATION: Primary | ICD-10-CM

## 2019-02-19 ENCOUNTER — OFFICE VISIT (OUTPATIENT)
Dept: INTERNAL MEDICINE | Facility: CLINIC | Age: 39
End: 2019-02-19
Payer: COMMERCIAL

## 2019-02-19 ENCOUNTER — LAB VISIT (OUTPATIENT)
Dept: LAB | Facility: HOSPITAL | Age: 39
End: 2019-02-19
Attending: FAMILY MEDICINE
Payer: COMMERCIAL

## 2019-02-19 VITALS
HEIGHT: 70 IN | SYSTOLIC BLOOD PRESSURE: 110 MMHG | WEIGHT: 227.06 LBS | HEART RATE: 70 BPM | DIASTOLIC BLOOD PRESSURE: 86 MMHG | BODY MASS INDEX: 32.51 KG/M2 | TEMPERATURE: 97 F

## 2019-02-19 DIAGNOSIS — Z00.00 BLOOD TESTS FOR ROUTINE GENERAL PHYSICAL EXAMINATION: ICD-10-CM

## 2019-02-19 DIAGNOSIS — Z00.00 ANNUAL PHYSICAL EXAM: Primary | ICD-10-CM

## 2019-02-19 DIAGNOSIS — Z00.00 ANNUAL PHYSICAL EXAM: ICD-10-CM

## 2019-02-19 LAB
ALBUMIN SERPL BCP-MCNC: 4.3 G/DL
ALP SERPL-CCNC: 76 U/L
ALT SERPL W/O P-5'-P-CCNC: 36 U/L
ANION GAP SERPL CALC-SCNC: 12 MMOL/L
AST SERPL-CCNC: 22 U/L
BASOPHILS # BLD AUTO: 0.04 K/UL
BASOPHILS NFR BLD: 0.5 %
BILIRUB SERPL-MCNC: 0.8 MG/DL
BUN SERPL-MCNC: 15 MG/DL
CALCIUM SERPL-MCNC: 9.9 MG/DL
CHLORIDE SERPL-SCNC: 103 MMOL/L
CHOLEST SERPL-MCNC: 221 MG/DL
CHOLEST/HDLC SERPL: 4.1 {RATIO}
CO2 SERPL-SCNC: 25 MMOL/L
CREAT SERPL-MCNC: 0.9 MG/DL
DIFFERENTIAL METHOD: NORMAL
EOSINOPHIL # BLD AUTO: 0 K/UL
EOSINOPHIL NFR BLD: 0.5 %
ERYTHROCYTE [DISTWIDTH] IN BLOOD BY AUTOMATED COUNT: 13.7 %
EST. GFR  (AFRICAN AMERICAN): >60 ML/MIN/1.73 M^2
EST. GFR  (NON AFRICAN AMERICAN): >60 ML/MIN/1.73 M^2
GLUCOSE SERPL-MCNC: 92 MG/DL
HCT VFR BLD AUTO: 48.8 %
HDLC SERPL-MCNC: 54 MG/DL
HDLC SERPL: 24.4 %
HGB BLD-MCNC: 16.6 G/DL
IMM GRANULOCYTES # BLD AUTO: 0.03 K/UL
IMM GRANULOCYTES NFR BLD AUTO: 0.4 %
LDLC SERPL CALC-MCNC: 146.8 MG/DL
LYMPHOCYTES # BLD AUTO: 2.1 K/UL
LYMPHOCYTES NFR BLD: 28.5 %
MCH RBC QN AUTO: 30 PG
MCHC RBC AUTO-ENTMCNC: 34 G/DL
MCV RBC AUTO: 88 FL
MONOCYTES # BLD AUTO: 0.6 K/UL
MONOCYTES NFR BLD: 8.7 %
NEUTROPHILS # BLD AUTO: 4.5 K/UL
NEUTROPHILS NFR BLD: 61.4 %
NONHDLC SERPL-MCNC: 167 MG/DL
NRBC BLD-RTO: 0 /100 WBC
PLATELET # BLD AUTO: 262 K/UL
PMV BLD AUTO: 9.3 FL
POTASSIUM SERPL-SCNC: 4.3 MMOL/L
PROT SERPL-MCNC: 7.7 G/DL
RBC # BLD AUTO: 5.53 M/UL
SODIUM SERPL-SCNC: 140 MMOL/L
TRIGL SERPL-MCNC: 101 MG/DL
TSH SERPL DL<=0.005 MIU/L-ACNC: 0.82 UIU/ML
WBC # BLD AUTO: 7.37 K/UL

## 2019-02-19 PROCEDURE — 99999 PR PBB SHADOW E&M-EST. PATIENT-LVL III: ICD-10-PCS | Mod: PBBFAC,,, | Performed by: FAMILY MEDICINE

## 2019-02-19 PROCEDURE — 99395 PR PREVENTIVE VISIT,EST,18-39: ICD-10-PCS | Mod: S$GLB,,, | Performed by: FAMILY MEDICINE

## 2019-02-19 PROCEDURE — 36415 COLL VENOUS BLD VENIPUNCTURE: CPT | Mod: PO

## 2019-02-19 PROCEDURE — 80061 LIPID PANEL: CPT

## 2019-02-19 PROCEDURE — 84443 ASSAY THYROID STIM HORMONE: CPT

## 2019-02-19 PROCEDURE — 99999 PR PBB SHADOW E&M-EST. PATIENT-LVL III: CPT | Mod: PBBFAC,,, | Performed by: FAMILY MEDICINE

## 2019-02-19 PROCEDURE — 99395 PREV VISIT EST AGE 18-39: CPT | Mod: S$GLB,,, | Performed by: FAMILY MEDICINE

## 2019-02-19 PROCEDURE — 80053 COMPREHEN METABOLIC PANEL: CPT

## 2019-02-19 PROCEDURE — 85025 COMPLETE CBC W/AUTO DIFF WBC: CPT

## 2019-02-19 PROCEDURE — 83036 HEMOGLOBIN GLYCOSYLATED A1C: CPT

## 2019-02-19 RX ORDER — FINASTERIDE 1 MG/1
1 TABLET, FILM COATED ORAL DAILY
Qty: 30 TABLET | Refills: 11 | Status: SHIPPED | OUTPATIENT
Start: 2019-02-19 | End: 2020-12-18 | Stop reason: SDUPTHER

## 2019-02-19 RX ORDER — IBUPROFEN 800 MG/1
TABLET ORAL
Qty: 60 TABLET | Refills: 5 | Status: SHIPPED | OUTPATIENT
Start: 2019-02-19 | End: 2020-09-30 | Stop reason: SDUPTHER

## 2019-02-19 NOTE — PROGRESS NOTES
"Subjective:      Patient ID: Luiz Ybarra is a 39 y.o. male.    Chief Complaint: Annual Exam    HPI   40 yo male here for annual visit.  Needs refill on propecia, has done well on the med.  2018, had kidney stones.  Saw urology, no problems since then.  Had cscope 2017//fam hx Colon CA.  Normal scope.  Occ gout/joint pain, uses ibuprofen PRN.  Under some stress, not exercising/doing good self care.    Past Medical History:   Diagnosis Date    Gout     Hypoglycemia     Nephrolithiasis      Family History   Problem Relation Age of Onset    Diabetes Father     Diabetes Sister     Colon polyps Sister     Diabetes Maternal Grandmother     Colon cancer Maternal Grandmother     Colon polyps Maternal Grandmother     Glaucoma Maternal Grandmother     Diabetes Paternal Grandmother     Glaucoma Paternal Grandmother     Cancer Paternal Grandfather     Colon cancer Maternal Uncle     Colon polyps Mother     Heart disease Neg Hx      Past Surgical History:   Procedure Laterality Date    COLONOSCOPY N/A 11/6/2017    Performed [facility] ENDO    KNEE SURGERY       Social History     Tobacco Use    Smoking status: Never Smoker    Smokeless tobacco: Never Used   Substance Use Topics    Alcohol use: No    Drug use: No       /86   Pulse 70   Temp 97.2 °F (36.2 °C) (Tympanic)   Ht 5' 10" (1.778 m)   Wt 103 kg (227 lb 1.2 oz)   BMI 32.58 kg/m²     Review of Systems   Constitutional: Negative for activity change, appetite change, chills, diaphoresis, fatigue, fever and unexpected weight change.   HENT: Negative for hearing loss and tinnitus.    Eyes: Negative for visual disturbance.   Respiratory: Negative for cough, chest tightness, shortness of breath and wheezing.    Cardiovascular: Negative for chest pain, palpitations and leg swelling.   Gastrointestinal: Negative for abdominal distention, abdominal pain, constipation and diarrhea.   Genitourinary: Negative for " difficulty urinating and discharge.   Musculoskeletal: Negative for arthralgias and back pain.   Neurological: Negative for dizziness, weakness and headaches.       Objective:     Physical Exam   Constitutional: He is oriented to person, place, and time. He appears well-developed and well-nourished. No distress.   HENT:   Right Ear: External ear normal.   Left Ear: External ear normal.   Nose: Nose normal.   Mouth/Throat: Oropharynx is clear and moist.   Eyes: Conjunctivae are normal. Pupils are equal, round, and reactive to light.   Neck: Normal range of motion. Neck supple.   Cardiovascular: Normal rate, regular rhythm and normal heart sounds.   No murmur heard.  Pulmonary/Chest: Effort normal and breath sounds normal. No respiratory distress. He has no wheezes.   Abdominal: Soft. Bowel sounds are normal. He exhibits no distension. There is no tenderness. There is no guarding.   Musculoskeletal: He exhibits no edema.   Neurological: He is alert and oriented to person, place, and time. No cranial nerve deficit.   Skin: Skin is warm and dry. No rash noted. He is not diaphoretic.   Psychiatric: He has a normal mood and affect. His behavior is normal. Judgment and thought content normal.   Nursing note and vitals reviewed.      Lab Results   Component Value Date    WBC 8.42 02/14/2017    HGB 16.8 02/14/2017    HCT 47.8 02/14/2017     02/14/2017    CHOL 216 (H) 02/14/2017    TRIG 87 02/14/2017    HDL 50 02/14/2017    ALT 35 02/14/2017    AST 23 02/14/2017     02/14/2017    K 4.8 02/14/2017     02/14/2017    CREATININE 1.0 02/14/2017    BUN 13 02/14/2017    CO2 32 (H) 02/14/2017    TSH 1.287 02/14/2017       Assessment:     1. Annual physical exam         Plan:     Annual physical exam  -     Hemoglobin A1c; Future; Expected date: 02/19/2019    Other orders  -     finasteride (PROPECIA) 1 mg tablet; Take 1 tablet (1 mg total) by mouth once daily.  Dispense: 30 tablet; Refill: 11  -     ibuprofen  (ADVIL,MOTRIN) 800 MG tablet; Take 1 tablet 3 times a day until directed to stop  Dispense: 60 tablet; Refill: 5    Update annual labs  Update scripts  Healthy eating/exercise/weight loss encouraged  F/u annually and PRN

## 2019-02-20 LAB
ESTIMATED AVG GLUCOSE: 91 MG/DL
HBA1C MFR BLD HPLC: 4.8 %

## 2020-03-09 ENCOUNTER — TELEPHONE (OUTPATIENT)
Dept: INTERNAL MEDICINE | Facility: CLINIC | Age: 40
End: 2020-03-09

## 2020-03-09 ENCOUNTER — OFFICE VISIT (OUTPATIENT)
Dept: INTERNAL MEDICINE | Facility: CLINIC | Age: 40
End: 2020-03-09
Payer: COMMERCIAL

## 2020-03-09 ENCOUNTER — TELEPHONE (OUTPATIENT)
Dept: UROLOGY | Facility: CLINIC | Age: 40
End: 2020-03-09

## 2020-03-09 ENCOUNTER — HOSPITAL ENCOUNTER (OUTPATIENT)
Dept: RADIOLOGY | Facility: HOSPITAL | Age: 40
Discharge: HOME | End: 2020-03-09
Attending: FAMILY MEDICINE
Payer: COMMERCIAL

## 2020-03-09 VITALS
DIASTOLIC BLOOD PRESSURE: 84 MMHG | HEIGHT: 70 IN | BODY MASS INDEX: 34.49 KG/M2 | TEMPERATURE: 97 F | HEART RATE: 75 BPM | OXYGEN SATURATION: 99 % | SYSTOLIC BLOOD PRESSURE: 122 MMHG | WEIGHT: 240.94 LBS

## 2020-03-09 DIAGNOSIS — N20.0 NEPHROLITHIASIS: Primary | ICD-10-CM

## 2020-03-09 DIAGNOSIS — R10.9 FLANK PAIN: ICD-10-CM

## 2020-03-09 PROCEDURE — 99999 PR PBB SHADOW E&M-EST. PATIENT-LVL III: CPT | Mod: PBBFAC,,, | Performed by: FAMILY MEDICINE

## 2020-03-09 PROCEDURE — 74176 CT RENAL STONE STUDY ABD PELVIS WO: ICD-10-PCS | Mod: 26,,, | Performed by: RADIOLOGY

## 2020-03-09 PROCEDURE — 99999 PR PBB SHADOW E&M-EST. PATIENT-LVL III: ICD-10-PCS | Mod: PBBFAC,,, | Performed by: FAMILY MEDICINE

## 2020-03-09 PROCEDURE — 99214 OFFICE O/P EST MOD 30 MIN: CPT | Mod: 25,S$GLB,, | Performed by: FAMILY MEDICINE

## 2020-03-09 PROCEDURE — 3008F PR BODY MASS INDEX (BMI) DOCUMENTED: ICD-10-PCS | Mod: CPTII,S$GLB,, | Performed by: FAMILY MEDICINE

## 2020-03-09 PROCEDURE — 99214 PR OFFICE/OUTPT VISIT, EST, LEVL IV, 30-39 MIN: ICD-10-PCS | Mod: 25,S$GLB,, | Performed by: FAMILY MEDICINE

## 2020-03-09 PROCEDURE — 74176 CT ABD & PELVIS W/O CONTRAST: CPT | Mod: 26,,, | Performed by: RADIOLOGY

## 2020-03-09 PROCEDURE — 96372 THER/PROPH/DIAG INJ SC/IM: CPT | Mod: S$GLB,,, | Performed by: FAMILY MEDICINE

## 2020-03-09 PROCEDURE — 74176 CT ABD & PELVIS W/O CONTRAST: CPT | Mod: TC

## 2020-03-09 PROCEDURE — 3008F BODY MASS INDEX DOCD: CPT | Mod: CPTII,S$GLB,, | Performed by: FAMILY MEDICINE

## 2020-03-09 PROCEDURE — 96372 PR INJECTION,THERAP/PROPH/DIAG2ST, IM OR SUBCUT: ICD-10-PCS | Mod: S$GLB,,, | Performed by: FAMILY MEDICINE

## 2020-03-09 RX ORDER — HYDROCODONE BITARTRATE AND ACETAMINOPHEN 7.5; 325 MG/1; MG/1
1 TABLET ORAL EVERY 8 HOURS PRN
Qty: 21 TABLET | Refills: 0 | Status: SHIPPED | OUTPATIENT
Start: 2020-03-09 | End: 2020-10-20

## 2020-03-09 RX ORDER — TAMSULOSIN HYDROCHLORIDE 0.4 MG/1
0.4 CAPSULE ORAL DAILY
Qty: 30 CAPSULE | Refills: 1 | Status: ON HOLD | OUTPATIENT
Start: 2020-03-09 | End: 2020-12-01 | Stop reason: HOSPADM

## 2020-03-09 RX ORDER — KETOROLAC TROMETHAMINE 30 MG/ML
30 INJECTION, SOLUTION INTRAMUSCULAR; INTRAVENOUS ONCE
Status: COMPLETED | OUTPATIENT
Start: 2020-03-09 | End: 2020-03-09

## 2020-03-09 RX ADMIN — KETOROLAC TROMETHAMINE 30 MG: 30 INJECTION, SOLUTION INTRAMUSCULAR; INTRAVENOUS at 09:03

## 2020-03-09 NOTE — TELEPHONE ENCOUNTER
I returned call to pt in regards to getting CT images, I informed pt he would have to get that through radiology. I was able to transfer pt to radiology for assistance. Pt thanked me and ended call. //BJ

## 2020-03-09 NOTE — PROGRESS NOTES
Subjective:       Patient ID: Luiz Ybarra is a 40 y.o. male.    Chief Complaint: Back Pain    Pt is a 40 year old with acute on set of right flank pain. Pt has has kidney stones in the past. No urinary issues or fever. No position makes it better.    Review of Systems   Constitutional: Negative.    Respiratory: Negative.    Cardiovascular: Negative.    Genitourinary: Negative.    Hematological: Negative.        Objective:      Physical Exam   Constitutional: He appears well-developed and well-nourished.   Cardiovascular: Normal rate and regular rhythm.   Pulmonary/Chest: Effort normal and breath sounds normal. No stridor. No respiratory distress.   Abdominal: Soft. Bowel sounds are normal.   Skin: Skin is warm and dry.       Assessment:       1. Nephrolithiasis    2. Flank pain        Plan:       Nephrolithiasis  Comments:  Highly suspect Kidney stone. Will get CT and pain managment start on flomax    Flank pain  Comments:  Pt is in pain. Ketorlac now. Kidney function is fine  Orders:  -     CT Renal Stone Study ABD Pelvis WO; Future; Expected date: 03/09/2020  -     ketorolac injection 30 mg  -     Urinalysis; Future; Expected date: 03/09/2020  -     Urine culture; Future; Expected date: 03/09/2020    Other orders  -     tamsulosin (FLOMAX) 0.4 mg Cap; Take 1 capsule (0.4 mg total) by mouth once daily.  Dispense: 30 capsule; Refill: 1  -     HYDROcodone-acetaminophen (NORCO) 7.5-325 mg per tablet; Take 1 tablet by mouth every 8 (eight) hours as needed for Pain.  Dispense: 21 tablet; Refill: 0

## 2020-03-09 NOTE — TELEPHONE ENCOUNTER
Dr Santos Reed would like this patient seen for Kidney stones. There are no openings until August. Is there any way you can have him seen sooner?   Thx

## 2020-03-09 NOTE — TELEPHONE ENCOUNTER
----- Message from Caren Lemos sent at 3/9/2020  4:53 PM CDT -----  Contact: Patient  Patient would like a call back concerning getting a copy of his CT Scan faxed to a different urologist. Please call to advise at Ph .[phone] (home)

## 2020-09-30 ENCOUNTER — PATIENT MESSAGE (OUTPATIENT)
Dept: GASTROENTEROLOGY | Facility: CLINIC | Age: 40
End: 2020-09-30

## 2020-09-30 ENCOUNTER — OFFICE VISIT (OUTPATIENT)
Dept: INTERNAL MEDICINE | Facility: CLINIC | Age: 40
End: 2020-09-30
Payer: COMMERCIAL

## 2020-09-30 ENCOUNTER — HOSPITAL ENCOUNTER (OUTPATIENT)
Dept: RADIOLOGY | Facility: HOSPITAL | Age: 40
Discharge: HOME | End: 2020-09-30
Attending: PHYSICIAN ASSISTANT
Payer: COMMERCIAL

## 2020-09-30 VITALS
HEART RATE: 91 BPM | OXYGEN SATURATION: 98 % | WEIGHT: 234.81 LBS | HEIGHT: 70 IN | BODY MASS INDEX: 33.62 KG/M2 | TEMPERATURE: 96 F | SYSTOLIC BLOOD PRESSURE: 122 MMHG | DIASTOLIC BLOOD PRESSURE: 76 MMHG

## 2020-09-30 DIAGNOSIS — R10.32 LEFT LOWER QUADRANT PAIN: ICD-10-CM

## 2020-09-30 DIAGNOSIS — K57.92 DIVERTICULITIS: Primary | ICD-10-CM

## 2020-09-30 DIAGNOSIS — M79.671 FOOT PAIN, RIGHT: ICD-10-CM

## 2020-09-30 DIAGNOSIS — R93.5 ABNORMAL CT OF THE ABDOMEN: Primary | ICD-10-CM

## 2020-09-30 PROCEDURE — 25500020 PHARM REV CODE 255: Performed by: PHYSICIAN ASSISTANT

## 2020-09-30 PROCEDURE — 99999 PR PBB SHADOW E&M-EST. PATIENT-LVL IV: ICD-10-PCS | Mod: PBBFAC,,, | Performed by: PHYSICIAN ASSISTANT

## 2020-09-30 PROCEDURE — 3008F PR BODY MASS INDEX (BMI) DOCUMENTED: ICD-10-PCS | Mod: CPTII,S$GLB,, | Performed by: PHYSICIAN ASSISTANT

## 2020-09-30 PROCEDURE — 99999 PR PBB SHADOW E&M-EST. PATIENT-LVL IV: CPT | Mod: PBBFAC,,, | Performed by: PHYSICIAN ASSISTANT

## 2020-09-30 PROCEDURE — 74177 CT ABD & PELVIS W/CONTRAST: CPT | Mod: TC

## 2020-09-30 PROCEDURE — 99214 PR OFFICE/OUTPT VISIT, EST, LEVL IV, 30-39 MIN: ICD-10-PCS | Mod: S$GLB,,, | Performed by: PHYSICIAN ASSISTANT

## 2020-09-30 PROCEDURE — 99214 OFFICE O/P EST MOD 30 MIN: CPT | Mod: S$GLB,,, | Performed by: PHYSICIAN ASSISTANT

## 2020-09-30 PROCEDURE — 3008F BODY MASS INDEX DOCD: CPT | Mod: CPTII,S$GLB,, | Performed by: PHYSICIAN ASSISTANT

## 2020-09-30 PROCEDURE — 74177 CT ABDOMEN PELVIS WITH CONTRAST: ICD-10-PCS | Mod: 26,,, | Performed by: RADIOLOGY

## 2020-09-30 PROCEDURE — 74177 CT ABD & PELVIS W/CONTRAST: CPT | Mod: 26,,, | Performed by: RADIOLOGY

## 2020-09-30 RX ORDER — CIPROFLOXACIN 500 MG/1
500 TABLET ORAL 2 TIMES DAILY
Qty: 20 TABLET | Refills: 0 | Status: SHIPPED | OUTPATIENT
Start: 2020-09-30 | End: 2020-10-10

## 2020-09-30 RX ORDER — IBUPROFEN 800 MG/1
800 TABLET ORAL EVERY 8 HOURS PRN
Qty: 60 TABLET | Refills: 0 | Status: SHIPPED | OUTPATIENT
Start: 2020-09-30 | End: 2020-10-30

## 2020-09-30 RX ORDER — METRONIDAZOLE 500 MG/1
500 TABLET ORAL EVERY 8 HOURS
Qty: 30 TABLET | Refills: 0 | Status: SHIPPED | OUTPATIENT
Start: 2020-09-30 | End: 2020-10-10

## 2020-09-30 RX ADMIN — IOHEXOL 1000 ML: 12 SOLUTION ORAL at 10:09

## 2020-09-30 RX ADMIN — IOHEXOL 100 ML: 350 INJECTION, SOLUTION INTRAVENOUS at 11:09

## 2020-09-30 NOTE — PATIENT INSTRUCTIONS
Diverticulitis    Some people get pouches along the wall of the colon as they get older. The pouches, called diverticuli, usually cause no symptoms. If the pouches become blocked, you can get an infection. This infection is called diverticulitis. It causes pain in your lower abdomen and fever. If not treated, it can become a serious condition, causing an abscess to form inside the pouch. The abscess may block the intestinal tract even or rupture, spreading infection throughout the abdomen.  When treatment is started early, oral antibiotics alone may be enough to cure diverticulitis. This method is tried first. But, if you don't improve or if your condition gets worse while using oral antibiotics, you may need to be admitted to the hospital for IV antibiotics. Severe cases may require surgery.  Home care  The following guidelines will help you care for yourself at home:  · During the acute illness, rest and follow your healthcare provider's instructions about diet. Sometimes you will need to follow a clear liquid diet to rest your bowel. Once your symptoms are better, you may be told to follow a low-fiber diet for some time. Include foods like:  ¨ Flake cereal, mashed potatoes, pancakes, waffles, pasta, white bread, rice, applesauce, bananas, eggs, fish, poultry, tofu, and cooked soft vegetables  · Take antibiotics exactly as instructed. Don't miss any doses or stop taking the medication, even if you feel better.  · Monitor your temperature and tell your healthcare provider if you have rising temperatures.  Preventing future attacks  Once you have an episode of diverticulitis, you are at risk for having it again. After you have recovered from this episode, you may be able to lower your risk by eating a high-fiber diet (20 gm/day to 35 gm/day of fiber). This cleans out the colon pouches that already exist and may prevent new ones from forming. Foods high in fiber include fresh fruits and edible peelings, raw or  lightly cooked vegetables, whole grain cereals and breads, dried beans and peas, and bran.  Other steps that can help prevent future attacks include:  · Take your medicines, such as antibiotics, as your healthcare provider says.  · Drink 6 to 8 glasses of water every day, unless told otherwise.  · Use a heating pad or hot water bottle to help abdominal cramping or pain.  · Begin an exercise program. Ask your healthcare provider how to get started. You can benefit from simple activities such as walking or gardening.  · Treat diarrhea with a bland diet. Start with liquids only; then slowly add fiber over time.  · Watch for changes in your bowel movements (constipation to diarrhea). Avoid constipation by eating a high fiber diet and taking a stool softener if needed.  · Get plenty of rest and sleep.  Follow-up care  Follow up with your healthcare provider as advised or sooner if you are not getting better in the next 2 days.  When to seek medical advice  Call your healthcare provider right away if any of these occur:  · Fever of 100.4°F (38°C) or higher, or as directed by your healthcare provider  · Repeated vomiting or swelling of the abdomen  · Weakness, dizziness, light-headedness  · Pain in your abdomen that gets worse, severe, or spreads to your back  · Pain that moves to the right lower abdomen  · Rectal bleeding (stools that are red, black or maroon color)  · Unexpected vaginal bleeding  Date Last Reviewed: 9/1/2016  © 9979-5163 [employer]. [address] [place] [address], [place] [address]. All rights reserved. This information is not intended as a substitute for professional medical care. Always follow your healthcare professional's instructions.        Discharge Instructions for Diverticulitis  You have been diagnosed with diverticulitis. This is a condition in which small pouches form in your colon (large intestine) and become inflamed or infected. Follow the guidelines below for home care.  As you  recover  Tips for recovery include:  · Eat a low-fiber diet. Your healthcare provider may advise a liquid diet. This gives your bowel a chance to rest so that it can recover.  · Foods to include: flake cereal, mashed potatoes, pancakes, waffles, pasta, white bread, rice, applesauce, bananas, eggs, fish, poultry, tofu, and well-cooked vegetables  · Take your medicines as directed. Do not stop taking the medicines, even if you feel better.  · Monitor your temperature and report any rising temperature to your healthcare provider.  · Take antibiotics exactly as directed. Do not miss any and keep taking them even if you feel better.   · Drink 6 to 8 glasses of water every day, unless directed otherwise.  · Use a heating pad or hot water bottle to reduce abdominal cramping or pain.  Preventing diverticulitis in the future  Tips for prevention include:  · Eat a high-fiber diet. Fiber adds bulk to the stool so that it passes through the large intestine more easily.  · Keep drinking 6 to 8 glasses of water every day, unless directed otherwise.  · Begin an exercise program. Ask your healthcare provider how to get started. You can benefit from simple activities such as walking or gardening.  · Treat diarrhea with a bland diet. Start with liquids only, then slowly add fiber over time.  · Watch for changes in your bowel movements (constipation to diarrhea).  · Avoid constipation with fiber and add a stool softener if needed.   · Get plenty of rest and sleep.  Follow-up care  Make a follow-up appointment as directed by our staff.  When to call your healthcare provider  Call your healthcare provider immediately if you have any of the following:  · Fever of 100.4°F (38.0°C) or higher, or as directed by your healthcare provider  · Chills  · Severe cramps in the belly, most commonly the lower left side  · Tenderness in the belly, most commonly the lower left side  · Nausea and vomiting  · Bleeding from your rectum   Date Last  Reviewed: 7/1/2016  © 6959-2921 [employer]. [address] [place] [address], [place] [address]. All rights reserved. This information is not intended as a substitute for professional medical care. Always follow your healthcare professional's instructions.        Understanding Diverticulosis and Diverticulitis     Pouches or diverticula usually occur in the lower part of the colon called the sigmoid.     The colon (large intestine) is the last part of the digestive tract. It absorbs water from stool and changes it from a liquid to a solid. In certain cases, small pouches called diverticula can form in the colon wall. This condition is called diverticulosis. The pouches can become infected. If this happens, it becomes a more serious problem called diverticulitis. These problems can be painful. But they can be managed.  Managing your condition  Diet changes or medicines may be prescribed.   If you have diverticulosis  Recommendations include:  · Diet changes are often enough to control symptoms. The main changes are adding fiber (roughage) and drinking more water. Fiber absorbs water as it travels through your colon. This helps your stool stay soft and move smoothly. Water helps this process.  · If needed, you may be told to take over-the-counter stool softeners.  · To help relieve pain, antispasmodic medicines may be prescribed.  · Watch for changes in your bowel movements. Tell the healthcare provider if you notice any changes.  · Begin an exercise program. Ask your healthcare provider how to get started.  · Get plenty of rest and sleep.   If you have diverticulitis  Treatment depends on how bad your symptoms are.  · For mild symptoms. You may be put on a liquid diet for a short time. Antibiotics are usually prescribed. If these two steps relieve your symptoms, you may then be prescribed a high-fiber diet. If you still have symptoms, your healthcare provider will discuss more treatment choices with you.  · For  severe symptoms. You may need to be admitted to the hospital. There, you can be given IV antibiotics and fluids. You will also be put on a low-fiber or liquid diet. Although not common, surgery is needed in some people with severe symptoms.  [place] to colon health     Diverticulitis occurs when the pouches become infected or inflamed.     Help keep your colon healthy with a diet that includes plenty of high-fiber fruits, vegetables, and whole grains. Drink plenty of liquids like water and juice. Maintain a healthy lifestyle including regular exercise, stress management, and adequate rest and sleep.   Date Last Reviewed: 7/1/2016  © 7977-8195 [employer]. [address] [place] [address], [place] [address]. All rights reserved. This information is not intended as a substitute for professional medical care. Always follow your healthcare professional's instructions.

## 2020-09-30 NOTE — PROGRESS NOTES
Subjective:      Patient ID: Luiz Ybarra is a 40 y.o. male.    Chief Complaint: Abdominal Pain    Abdominal Pain  This is a new problem. Episode onset: 3 days. The problem occurs constantly. The problem has been rapidly worsening. The pain is located in the LLQ and suprapubic region. The pain is at a severity of 10/10. The pain is severe. The quality of the pain is aching, burning and sharp. The abdominal pain does not radiate. Associated symptoms include anorexia, constipation, diarrhea, a fever (subjective), flatus and nausea. Pertinent negatives include no arthralgias, belching, dysuria, frequency, headaches, hematochezia, hematuria, melena, myalgias, vomiting or weight loss. Nothing aggravates the pain. The pain is relieved by nothing. He has tried nothing for the symptoms.     Patient Active Problem List   Diagnosis    LLQ abdominal pain    Nephrolithiasis    Flank pain   + history of diverticulosis on colonoscopy done in the past. Has not had an acute flare up  -Additionally, pt requesting a refill on his ibuprofen 800mg. He takes it PRN right toe pain. Pain on the medial side of his right toe. Has seen specialist for it. Uric acid has been normal in the past with acute flare ups. Ibuprofen resolves the issue.     Review of Systems   Constitutional: Positive for activity change, appetite change, chills, diaphoresis, fatigue and fever (subjective). Negative for unexpected weight change and weight loss.   HENT: Negative.  Negative for congestion, hearing loss, postnasal drip, rhinorrhea, sore throat, trouble swallowing and voice change.    Eyes: Negative.  Negative for visual disturbance.   Respiratory: Negative.  Negative for cough, choking, chest tightness and shortness of breath.    Cardiovascular: Negative for chest pain, palpitations and leg swelling.   Gastrointestinal: Positive for abdominal distention, abdominal pain, anorexia, constipation, diarrhea, flatus and nausea. Negative for anal  "bleeding, blood in stool, hematochezia, melena, rectal pain and vomiting.   Endocrine: Negative for cold intolerance, heat intolerance, polydipsia and polyuria.   Genitourinary: Negative.  Negative for decreased urine volume, difficulty urinating, discharge, dysuria, frequency, genital sores, hematuria, penile pain, penile swelling and scrotal swelling.   Musculoskeletal: Negative for arthralgias, back pain, gait problem, joint swelling and myalgias.   Skin: Negative for color change, pallor, rash and wound.   Neurological: Negative for dizziness, tremors, weakness, light-headedness, numbness and headaches.   Hematological: Negative for adenopathy.   Psychiatric/Behavioral: Negative for behavioral problems, confusion, self-injury, sleep disturbance and suicidal ideas. The patient is not nervous/anxious.      Objective:   /76 (BP Location: Left arm, Patient Position: Sitting, BP Method: Large (Manual))   Pulse 91   Temp 96.3 °F (35.7 °C) (Tympanic)   Ht 5' 10" (1.778 m)   Wt 106.5 kg (234 lb 12.6 oz)   SpO2 98%   BMI 33.69 kg/m²     Physical Exam  Vitals signs and nursing note reviewed.   Constitutional:       General: He is not in acute distress.     Appearance: Normal appearance. He is well-developed. He is not ill-appearing, toxic-appearing or diaphoretic.   HENT:      Head: Normocephalic and atraumatic.   Cardiovascular:      Rate and Rhythm: Normal rate and regular rhythm.      Heart sounds: Normal heart sounds. No murmur. No friction rub. No gallop.    Pulmonary:      Effort: Pulmonary effort is normal. No respiratory distress.      Breath sounds: Normal breath sounds. No wheezing or rales.   Abdominal:      General: Abdomen is protuberant. Bowel sounds are increased. There is distension. There is no abdominal bruit. There are no signs of injury.      Tenderness: There is abdominal tenderness in the suprapubic area and left lower quadrant. There is guarding and rebound. There is no right CVA " tenderness or left CVA tenderness.   Musculoskeletal:      Left foot: Normal range of motion and normal capillary refill. Tenderness present. No bony tenderness, swelling, crepitus, deformity or laceration.        Feet:    Skin:     General: Skin is warm.      Findings: No rash.   Neurological:      Mental Status: He is alert and oriented to person, place, and time.   Psychiatric:         Behavior: Behavior normal.         Thought Content: Thought content normal.         Judgment: Judgment normal.         Assessment:     1. Diverticulitis    2. Left lower quadrant pain    3. Foot pain, right      Plan:   Diverticulitis  -     metroNIDAZOLE (FLAGYL) 500 MG tablet; Take 1 tablet (500 mg total) by mouth every 8 (eight) hours. for 10 days  Dispense: 30 tablet; Refill: 0  -     ciprofloxacin HCl (CIPRO) 500 MG tablet; Take 1 tablet (500 mg total) by mouth 2 (two) times daily. for 10 days  Dispense: 20 tablet; Refill: 0  -bowel rest  Educational handout on over-the-counter medications and at-home conservative care, pertinent to the patients diagnosis today, was handed to the patient and discussed in detail.    Left lower quadrant pain  -     Creatinine, serum; Future; Expected date: 09/30/2020  -     CT Abdomen Pelvis With Contrast; Future; Expected date: 09/30/2020    Follow up if symptoms worsen or fail to improve.

## 2020-10-01 ENCOUNTER — OFFICE VISIT (OUTPATIENT)
Dept: GASTROENTEROLOGY | Facility: CLINIC | Age: 40
End: 2020-10-01
Payer: COMMERCIAL

## 2020-10-01 VITALS
WEIGHT: 236.75 LBS | HEIGHT: 70 IN | DIASTOLIC BLOOD PRESSURE: 80 MMHG | SYSTOLIC BLOOD PRESSURE: 112 MMHG | HEART RATE: 75 BPM | BODY MASS INDEX: 33.89 KG/M2

## 2020-10-01 DIAGNOSIS — K57.92 DIVERTICULITIS: Primary | ICD-10-CM

## 2020-10-01 DIAGNOSIS — R93.5 ABNORMAL CT OF THE ABDOMEN: ICD-10-CM

## 2020-10-01 PROCEDURE — 99213 PR OFFICE/OUTPT VISIT, EST, LEVL III, 20-29 MIN: ICD-10-PCS | Mod: S$GLB,,, | Performed by: PHYSICIAN ASSISTANT

## 2020-10-01 PROCEDURE — 99999 PR PBB SHADOW E&M-EST. PATIENT-LVL IV: CPT | Mod: PBBFAC,,, | Performed by: PHYSICIAN ASSISTANT

## 2020-10-01 PROCEDURE — 3008F BODY MASS INDEX DOCD: CPT | Mod: CPTII,S$GLB,, | Performed by: PHYSICIAN ASSISTANT

## 2020-10-01 PROCEDURE — 99999 PR PBB SHADOW E&M-EST. PATIENT-LVL IV: ICD-10-PCS | Mod: PBBFAC,,, | Performed by: PHYSICIAN ASSISTANT

## 2020-10-01 PROCEDURE — 99213 OFFICE O/P EST LOW 20 MIN: CPT | Mod: S$GLB,,, | Performed by: PHYSICIAN ASSISTANT

## 2020-10-01 PROCEDURE — 3008F PR BODY MASS INDEX (BMI) DOCUMENTED: ICD-10-PCS | Mod: CPTII,S$GLB,, | Performed by: PHYSICIAN ASSISTANT

## 2020-10-01 NOTE — LETTER
October 5, 2020      Maddi Ho PA-C  [address]  [place] [address]           [facility]  [address] [place] [id number]  Phone: [phone]  Fax: [phone]          Patient: Luiz Ybarra   MR Number: [id number]   YOB: 1980   Date of Visit: 10/1/2020       Dear Maddi Ho:    Thank you for referring Luiz Ybarra to me for evaluation. Attached you will find relevant portions of my assessment and plan of care.    If you have questions, please do not hesitate to call me. I look forward to following Luiz Ybarra along with you.    Sincerely,    Mildred Fairbanks PA-C    Enclosure  CC:  No Recipients    If you would like to receive this communication electronically, please contact [email or web address] or [phone] to request more information on [employer] Link access.    For providers and/or their staff who would like to refer a patient to [facility], please contact us through our one-stop-shop provider referral line, [facility] , at [phone].    If you feel you have received this communication in error or would no longer like to receive these types of communications, please e-mail [email or web address]

## 2020-10-01 NOTE — PROGRESS NOTES
Subjective:      Patient ID: Luiz Ybarra is a 40 y.o. male.    Chief Complaint: Diverticulitis    HPI:  Patient reports today for evaluation of diverticulitis. CT yesterday confirmed diagnosis, but mentions that findings have similar appearance of neoplasm as well - follow up colonoscopy recommended. Patient is taking Cipro and flagyl. He reports having taken 3 doses of medication. He notes mild improvement, but continues to the feel the discomfort, more with movement. He is experiencing pain to the LLQ along with diarrhea. Denies fevers or blood in the stool. Last colonoscopy in 2017 which showed sigmoid diverticulosis. Has had one additional episode of diverticulitis in the past.     Review of Systems   Constitutional: Positive for activity change. Negative for appetite change, chills, diaphoresis, fatigue, fever and unexpected weight change.   Respiratory: Negative for cough and shortness of breath.    Cardiovascular: Negative for chest pain.   Gastrointestinal: Positive for abdominal pain and diarrhea. Negative for abdominal distention, anal bleeding, blood in stool, constipation, nausea, rectal pain and vomiting.   Genitourinary: Negative for dysuria.   Musculoskeletal: Negative for back pain.   Neurological: Negative for dizziness, weakness, light-headedness and numbness.   Psychiatric/Behavioral: Negative for dysphoric mood. The patient is nervous/anxious.        Medical History: Reviewed    Social History: Reviewed    Allergies: Reviewed    Objective:     Physical Exam  Constitutional:       General: He is not in acute distress.     Appearance: Normal appearance. He is not ill-appearing, toxic-appearing or diaphoretic.   HENT:      Head: Normocephalic and atraumatic.   Eyes:      Extraocular Movements: Extraocular movements intact.   Neck:      Musculoskeletal: Normal range of motion.   Cardiovascular:      Rate and Rhythm: Normal rate and regular rhythm.   Pulmonary:      Effort: Pulmonary effort  is normal. No respiratory distress.      Breath sounds: Normal breath sounds.   Abdominal:      General: Bowel sounds are normal. There is no distension.      Palpations: Abdomen is soft.      Comments: Did not palpate abdomen due to discomfort.   Musculoskeletal: Normal range of motion.   Skin:     General: Skin is warm and dry.      Coloration: Skin is not pale.      Findings: No erythema.   Neurological:      General: No focal deficit present.      Mental Status: He is alert and oriented to person, place, and time.   Psychiatric:         Mood and Affect: Mood normal.         Behavior: Behavior normal.         Thought Content: Thought content normal.         Judgment: Judgment normal.         Assessment:     1. Diverticulitis    2. Abnormal CT of the abdomen        Plan:     -Follow up with patient tomorrow and Monday to ensure symptoms improving.  -After treatment, will wait 8 weeks and then repeat colonoscopy.  -If not better with treatment, may need to complete colonoscopy sooner rather than later.  -Instructed ER over the weekend with any worsening signs or symptoms such as severe abdominal pain, vomiting, blood in the stool or new onset fevers.  -continue abx treatment as prescribed.      Luiz was seen today for diverticulitis.    Diagnoses and all orders for this visit:    Diverticulitis    Abnormal CT of the abdomen  -     Ambulatory referral/consult to Gastroenterology        No follow-ups on file.    Thank you for the opportunity to participate in the care of this patient.   Mildred Fairbanks PA-C.

## 2020-10-02 ENCOUNTER — PATIENT MESSAGE (OUTPATIENT)
Dept: GASTROENTEROLOGY | Facility: CLINIC | Age: 40
End: 2020-10-02

## 2020-10-02 ENCOUNTER — TELEPHONE (OUTPATIENT)
Dept: GASTROENTEROLOGY | Facility: CLINIC | Age: 40
End: 2020-10-02

## 2020-10-02 NOTE — TELEPHONE ENCOUNTER
Attempting to contact patient to do well check. No answer, left message for patient to return call.

## 2020-10-20 ENCOUNTER — PATIENT OUTREACH (OUTPATIENT)
Dept: ADMINISTRATIVE | Facility: OTHER | Age: 40
End: 2020-10-20

## 2020-10-20 ENCOUNTER — OFFICE VISIT (OUTPATIENT)
Dept: GASTROENTEROLOGY | Facility: CLINIC | Age: 40
End: 2020-10-20
Payer: COMMERCIAL

## 2020-10-20 VITALS
DIASTOLIC BLOOD PRESSURE: 68 MMHG | HEART RATE: 81 BPM | BODY MASS INDEX: 32.95 KG/M2 | HEIGHT: 70 IN | OXYGEN SATURATION: 97 % | SYSTOLIC BLOOD PRESSURE: 104 MMHG | WEIGHT: 230.19 LBS

## 2020-10-20 DIAGNOSIS — K59.00 CONSTIPATION, UNSPECIFIED CONSTIPATION TYPE: ICD-10-CM

## 2020-10-20 DIAGNOSIS — Z87.19 HISTORY OF COLONIC DIVERTICULITIS: Primary | ICD-10-CM

## 2020-10-20 PROCEDURE — 3008F BODY MASS INDEX DOCD: CPT | Mod: CPTII,S$GLB,, | Performed by: PHYSICIAN ASSISTANT

## 2020-10-20 PROCEDURE — 99999 PR PBB SHADOW E&M-EST. PATIENT-LVL III: ICD-10-PCS | Mod: PBBFAC,,, | Performed by: PHYSICIAN ASSISTANT

## 2020-10-20 PROCEDURE — 99214 OFFICE O/P EST MOD 30 MIN: CPT | Mod: S$GLB,,, | Performed by: PHYSICIAN ASSISTANT

## 2020-10-20 PROCEDURE — 3008F PR BODY MASS INDEX (BMI) DOCUMENTED: ICD-10-PCS | Mod: CPTII,S$GLB,, | Performed by: PHYSICIAN ASSISTANT

## 2020-10-20 PROCEDURE — 99999 PR PBB SHADOW E&M-EST. PATIENT-LVL III: CPT | Mod: PBBFAC,,, | Performed by: PHYSICIAN ASSISTANT

## 2020-10-20 PROCEDURE — 99214 PR OFFICE/OUTPT VISIT, EST, LEVL IV, 30-39 MIN: ICD-10-PCS | Mod: S$GLB,,, | Performed by: PHYSICIAN ASSISTANT

## 2020-10-20 RX ORDER — SODIUM, POTASSIUM,MAG SULFATES 17.5-3.13G
1 SOLUTION, RECONSTITUTED, ORAL ORAL DAILY
Qty: 1 KIT | Refills: 0 | Status: SHIPPED | OUTPATIENT
Start: 2020-10-20 | End: 2020-10-22

## 2020-10-20 NOTE — PROGRESS NOTES
Health Maintenance Due   Topic Date Due    Hepatitis C Screening  1980    HIV Screening  01/02/1996    Lipid Panel  02/19/2020    Influenza Vaccine (1) 08/01/2020     Updates were requested from care everywhere.  Chart was reviewed for overdue Proactive [facility] Encounters (EBER) topics (CRS, Breast Cancer Screening, Eye exam)  Health Maintenance has been updated.  [facility] immunization registry triggered.  Immunizations were reconciled.

## 2020-10-20 NOTE — PROGRESS NOTES
"1. Have you been admitted overnight to the hospital in the past 3 months? no   If yes, schedule an appointment with PCP before scheduling endoscopic procedure.     2. Have you had a stent placed in the last 12 months? no   If yes, for a screening visit, cancel and message the ordering provider.  The patient will need a new order when the time is appropriate.     3. Have you had a stroke or heart attack in the past 6 months? no   If yes, cancel and refer patient to ordering provider for clearance, also message ordering provider to inform.     4. Have you had any chest pain in the past 3 months? no   If yes, Have you been evaluated by your PCP and/or cardiologist and it was determined to not be heart related? not applicable   If No, Pt needs to be seen by PCP or Cardiologist .  Pt can be scheduled once clearance obtained by either of those providers.     5. Do you take prescription weight loss medications?  no   If yes, must stop for 2 weeks prior to procedure.     6. Have you been diagnosed with diverticulitis within the past 3 months? yes   If yes, must have been seen by GI within the last 3 months, if not schedule with [facility].    If pt has been seen by GI, schedule procedure 8-12 weeks post antibiotic treatment.     7. Are you on Dialysis? no  If yes, schedule procedure for the day AFTER dialysis.  Appt time should be 9am or later, patient arrival time is 2 hours prior.  Nulytely or miralax prep for all patients with kidney disease.     8. Are you diabetic?  no   If yes, schedule morning appt. Advise pt to hold all diabetic meds day of procedure.     9. If pt is older than 80 years of age and HAS NOT been seen by GI or PCP within the last 6 months, needs appt with GI [facility].   If pt has been seen by the GI provider or PCP within the past 6  months AND meets criteria, schedule procedure AND send message to the endoscopist.     10. Is patient on a "high risk" medication (blood thinner/antiplatelet agent)?  no   If yes, " has cardiac clearance been obtained within the last 60 days? N/A   If no, a new clearance needs to be obtained.       I have reviewed the last colonoscopy for recommendations regarding next procedure bowel prep.  yes  I have reviewed medications and allergies.  yes  I have verified the pharmacy information and appropriate prep sent if needed. yes  Prep instructions have been mailed or sent to portal per patient request. yes    If answers yes to any of the following, schedule at [facility] ONLY. If No, OK for either location.     Is BMI over 45? no  Any complaints of chest pain, new onset or at rest? no  Does pt have an AICD? no  Is there a diagnosis of heart failure? no  Is patient on dialysis? no  Does patient have an insulin pump? no  If procedure for esophageal banding? no

## 2020-10-20 NOTE — PROGRESS NOTES
Subjective:      Patient ID: Luiz Ybarra is a 40 y.o. male.    Chief Complaint: Diverticulitis (follow up)    HPI:  Patient reports today for follow up of diverticulitis. He completed his antibiotic regimen approximately 2 weeks ago. States his symptoms of left sided abdominal pain have completely resolved. Denied ever having blood in the stool or fevers. No current complaints. Would like to get scheduled for follow up colonoscopy.    Review of Systems   Constitutional: Negative for activity change, appetite change, chills, diaphoresis, fatigue, fever and unexpected weight change.   Respiratory: Negative for cough and shortness of breath.    Cardiovascular: Negative for chest pain.   Gastrointestinal: Positive for constipation (mild). Negative for abdominal distention, abdominal pain, blood in stool, diarrhea, nausea, rectal pain and vomiting.   Neurological: Negative for dizziness, weakness and light-headedness.   Psychiatric/Behavioral: Negative for dysphoric mood. The patient is not nervous/anxious.        Medical History: Reviewed    Social History: Reviewed    Allergies: Reviewed    Objective:     Physical Exam  Constitutional:       General: He is not in acute distress.     Appearance: Normal appearance. He is not ill-appearing, toxic-appearing or diaphoretic.   HENT:      Head: Normocephalic and atraumatic.   Eyes:      Extraocular Movements: Extraocular movements intact.   Neck:      Musculoskeletal: Normal range of motion.   Cardiovascular:      Rate and Rhythm: Normal rate and regular rhythm.   Pulmonary:      Effort: Pulmonary effort is normal. No respiratory distress.   Abdominal:      General: Bowel sounds are normal. There is no distension.      Palpations: Abdomen is soft. There is no mass.      Tenderness: There is no abdominal tenderness. There is no guarding or rebound.      Hernia: No hernia is present.   Musculoskeletal: Normal range of motion.   Skin:     General: Skin is warm and dry.       Coloration: Skin is not pale.      Findings: No erythema.   Neurological:      General: No focal deficit present.      Mental Status: He is alert and oriented to person, place, and time.   Psychiatric:         Mood and Affect: Mood normal.         Behavior: Behavior normal.         Thought Content: Thought content normal.         Judgment: Judgment normal.         Assessment:     1. History of colonic diverticulitis    2. Constipation, unspecified constipation type        Plan:     -Begin daily Miralax to aid with bowel maintenance. Increase water intake.  -Schedule for follow up colonoscopy in 6 weeks.   -COVID screen  -Suprep bowel prep  -Instructed patient to follow up with any new or worsening symptoms.    Luiz was seen today for diverticulitis.    Diagnoses and all orders for this visit:    History of colonic diverticulitis  -     Case request GI: COLONOSCOPY  -     COVID-19 Routine Screening; Future  -     sodium,potassium,mag sulfates (SUPREP BOWEL PREP KIT) 17.5-3.13-1.6 gram SolR; Take 177 mLs by mouth once daily. for 2 days    Constipation, unspecified constipation type        No follow-ups on file.    Thank you for the opportunity to participate in the care of this patient.   Mildred Fairbanks PA-C.

## 2020-11-25 ENCOUNTER — TELEPHONE (OUTPATIENT)
Dept: ENDOSCOPY | Facility: HOSPITAL | Age: 40
End: 2020-11-25

## 2020-11-26 NOTE — TELEPHONE ENCOUNTER
Pre op call complete. Prep instructions sent to pt's portal per pt request. Instructions reviewed with pt. Pt expressed understanding.

## 2020-11-28 ENCOUNTER — LAB VISIT (OUTPATIENT)
Dept: OTOLARYNGOLOGY | Facility: CLINIC | Age: 40
End: 2020-11-28
Payer: COMMERCIAL

## 2020-11-28 DIAGNOSIS — Z87.19 HISTORY OF COLONIC DIVERTICULITIS: ICD-10-CM

## 2020-11-28 PROCEDURE — U0003 INFECTIOUS AGENT DETECTION BY NUCLEIC ACID (DNA OR RNA); SEVERE ACUTE RESPIRATORY SYNDROME CORONAVIRUS 2 (SARS-COV-2) (CORONAVIRUS DISEASE [COVID-19]), AMPLIFIED PROBE TECHNIQUE, MAKING USE OF HIGH THROUGHPUT TECHNOLOGIES AS DESCRIBED BY CMS-2020-01-R: HCPCS

## 2020-11-29 LAB — SARS-COV-2 RNA RESP QL NAA+PROBE: NOT DETECTED

## 2020-12-01 ENCOUNTER — HOSPITAL ENCOUNTER (OUTPATIENT)
Facility: HOSPITAL | Age: 40
Discharge: HOME | End: 2020-12-01
Attending: INTERNAL MEDICINE | Admitting: INTERNAL MEDICINE
Payer: COMMERCIAL

## 2020-12-01 ENCOUNTER — ANESTHESIA (OUTPATIENT)
Dept: ENDOSCOPY | Facility: HOSPITAL | Age: 40
End: 2020-12-01
Payer: COMMERCIAL

## 2020-12-01 ENCOUNTER — TELEPHONE (OUTPATIENT)
Dept: SURGERY | Facility: CLINIC | Age: 40
End: 2020-12-01

## 2020-12-01 ENCOUNTER — ANESTHESIA EVENT (OUTPATIENT)
Dept: ENDOSCOPY | Facility: HOSPITAL | Age: 40
End: 2020-12-01
Payer: COMMERCIAL

## 2020-12-01 VITALS
SYSTOLIC BLOOD PRESSURE: 124 MMHG | WEIGHT: 222.69 LBS | TEMPERATURE: 98 F | RESPIRATION RATE: 17 BRPM | HEIGHT: 70 IN | BODY MASS INDEX: 31.88 KG/M2 | OXYGEN SATURATION: 100 % | HEART RATE: 70 BPM | DIASTOLIC BLOOD PRESSURE: 77 MMHG

## 2020-12-01 DIAGNOSIS — Z87.19 HISTORY OF DIVERTICULITIS OF COLON: ICD-10-CM

## 2020-12-01 PROCEDURE — 37000009 HC ANESTHESIA EA ADD 15 MINS: Performed by: INTERNAL MEDICINE

## 2020-12-01 PROCEDURE — 63600175 PHARM REV CODE 636 W HCPCS: Performed by: NURSE ANESTHETIST, CERTIFIED REGISTERED

## 2020-12-01 PROCEDURE — 25000003 PHARM REV CODE 250: Performed by: NURSE ANESTHETIST, CERTIFIED REGISTERED

## 2020-12-01 PROCEDURE — 45378 DIAGNOSTIC COLONOSCOPY: CPT | Performed by: INTERNAL MEDICINE

## 2020-12-01 PROCEDURE — 37000008 HC ANESTHESIA 1ST 15 MINUTES: Performed by: INTERNAL MEDICINE

## 2020-12-01 PROCEDURE — 45378 PR COLONOSCOPY,DIAGNOSTIC: ICD-10-PCS | Mod: ,,, | Performed by: INTERNAL MEDICINE

## 2020-12-01 PROCEDURE — 45378 DIAGNOSTIC COLONOSCOPY: CPT | Mod: ,,, | Performed by: INTERNAL MEDICINE

## 2020-12-01 RX ORDER — PROPOFOL 10 MG/ML
VIAL (ML) INTRAVENOUS
Status: DISCONTINUED | OUTPATIENT
Start: 2020-12-01 | End: 2020-12-01

## 2020-12-01 RX ORDER — SODIUM CHLORIDE, SODIUM LACTATE, POTASSIUM CHLORIDE, CALCIUM CHLORIDE 600; 310; 30; 20 MG/100ML; MG/100ML; MG/100ML; MG/100ML
INJECTION, SOLUTION INTRAVENOUS CONTINUOUS PRN
Status: DISCONTINUED | OUTPATIENT
Start: 2020-12-01 | End: 2020-12-01

## 2020-12-01 RX ORDER — LIDOCAINE HYDROCHLORIDE 10 MG/ML
INJECTION, SOLUTION EPIDURAL; INFILTRATION; INTRACAUDAL; PERINEURAL
Status: DISCONTINUED | OUTPATIENT
Start: 2020-12-01 | End: 2020-12-01

## 2020-12-01 RX ORDER — SODIUM CHLORIDE, SODIUM LACTATE, POTASSIUM CHLORIDE, CALCIUM CHLORIDE 600; 310; 30; 20 MG/100ML; MG/100ML; MG/100ML; MG/100ML
INJECTION, SOLUTION INTRAVENOUS CONTINUOUS
Status: DISCONTINUED | OUTPATIENT
Start: 2020-12-01 | End: 2020-12-01 | Stop reason: HOSPADM

## 2020-12-01 RX ADMIN — PROPOFOL 30 MG: 10 INJECTION, EMULSION INTRAVENOUS at 07:12

## 2020-12-01 RX ADMIN — SODIUM CHLORIDE, SODIUM LACTATE, POTASSIUM CHLORIDE, AND CALCIUM CHLORIDE: .6; .31; .03; .02 INJECTION, SOLUTION INTRAVENOUS at 06:12

## 2020-12-01 RX ADMIN — PROPOFOL 20 MG: 10 INJECTION, EMULSION INTRAVENOUS at 07:12

## 2020-12-01 RX ADMIN — PROPOFOL 100 MG: 10 INJECTION, EMULSION INTRAVENOUS at 07:12

## 2020-12-01 RX ADMIN — LIDOCAINE HYDROCHLORIDE 50 MG: 10 INJECTION, SOLUTION EPIDURAL; INFILTRATION; INTRACAUDAL; PERINEURAL at 07:12

## 2020-12-01 NOTE — DISCHARGE INSTRUCTIONS
Hemorrhoids    Hemorrhoids are swollen and inflamed veins inside the rectum and near the anus. The rectum is the last several inches of the colon. The anus is the passage between the rectum and the outside of the body.  Causes  The veins can become swollen due to increased pressure in them. This is most often caused by:  · Chronic constipation or diarrhea  · Straining when having a bowel movement  · Sitting too long on the toilet  · A low-fiber diet  · Pregnancy  Symptoms  · Bleeding from the rectum (this may be noticeable after bowel movements)  · Lump near the anus  · Itching around the anus  · Pain around the anus  There are different types of hemorrhoids. Depending on the type you have and the severity, you may be able to treat yourself at home. In some cases, a procedure may be the best treatment option. Your healthcare provider can tell you more about this, if needed.  Home care  General care  · To get relief from pain or itching, try:  ¨ Topical products. Your healthcare provider may prescribe or recommend creams, ointments, or pads that can be applied to the hemorrhoid. Use these exactly as directed.  ¨ Medicines. Your healthcare provider may recommend stool softeners, suppositories, or laxatives to help manage constipation. Use these exactly as directed.  ¨ Sitz baths. A sitz bath involves sitting in a few inches of warm bath water. Be careful not to make the water so hot that you burn yourself--test it before sitting in it. Soak for about 10 to 15 minutes a few times a day. This may help relieve pain.  Tips to help prevent hemorrhoids  · Eat more fiber. Fiber adds bulk to stool and absorbs water as it moves through your colon. This makes stool softer and easier to pass.  ¨ Increase the fiber in your diet with more fiber-rich foods. These include fresh fruit, vegetables, and whole grains.  ¨ Take a fiber supplement or bulking agent, if advised to by your provider. These include products such as psyllium  or methylcellulose.  · Drink plenty of water, if directed to by your provider. This can help keep stool soft.  · Be more active. Frequent exercise aids digestion and helps prevent constipation. It may also help make bowel movements more regular.  · Dont strain during bowel movements. This can make hemorrhoids more likely. Also, dont sit on the toilet for long periods of time.  Follow-up care  Follow up with your healthcare provider, or as advised. If a culture or imaging tests were done, you will be notified of the results when they are ready. This may take a few days or longer.  When to seek medical advice  Call your healthcare provider right away if any of these occur:  · Increased bleeding from the rectum  · Increased pain around the rectum or anus  · Weakness or dizziness  Call 911  Call 911 or return to the emergency department right away if any of these occur:  · Trouble breathing or swallowing  · Fainting or loss of consciousness  · Unusually fast heart rate  · Vomiting blood  · Large amounts of blood in stool  Date Last Reviewed: 6/22/2015 © 2000-2017 [employer]. [address] [place] [address]. All rights reserved. This information is not intended as a substitute for professional medical care. Always follow your healthcare professional's instructions.        Diverticulosis    Diverticulosis means that small pouches have formed in the wall of your large intestine (colon). Most often, this problem causes no symptoms and is common as people age. But the pouches in the colon are at risk of becoming infected. When this happens, the condition is called diverticulitis. Although most people with diverticulosis never develop diverticulitis, it is still not uncommon. Rectal bleeding can also occur and in less common situations, a type of colon inflammation called colitis.  While most people do not have symptoms, some people with diverticulosis may have:  · Abdominal cramps and  pain  · Bloating  · Constipation  · Change in bowel habits  Causes  The exact cause of diverticulosis (and diverticulitis) has not been proved, but a few things are associated with the condition:  · Low-fiber diet  · Constipation  · Lack of exercise  Your healthcare provider will talk with you about how to manage your condition. Diet changes may be all that are needed to help control diverticulosis and prevent progression to diverticulitis. If you develop diverticulitis, you will likely need other treatments.  Home care  You may be told to take fiber supplements daily. Fiber adds bulk to the stool so that it passes through the colon more easily. Stool softeners may be recommended. You may also be given medications for pain relief. Be sure to take all medications as directed.  In the past, people were told to avoid corn, nuts, and seeds. This is no longer necessary.  Follow these guidelines when caring for yourself at home:  · Eat unprocessed foods that are high in fiber. Whole grains, fruits, and vegetables are good choices.  · Drink 6 to 8 glasses of water every day unless your healthcare provider has you limit how much fluid you should have.  · Watch for changes in your bowel movements. Tell your provider if you notice any changes.  · Begin an exercise program. Ask your provider how to get started. Generally, walking is the best.  · Get plenty of rest and sleep.  Follow-up care  Follow up with your healthcare provider, or as advised. Regular visits may be needed to check on your health. Sometimes special procedures such as colonoscopy, are needed after an episode of diverticulitis or blooding. Be sure to keep all your appointments.  If a stool sample was taken, or cultures were done, you should be told if they are positive, or if your treatment needs to be changed. You can call as directed for the results.  If X-rays were done, a radiologist will look at them. You will be told if there is a change in your  treatment.  If antibiotics were prescribed, be sure to finish them all.  When to seek medical advice  Call your healthcare provider right away if any of these occur:  · Fever of 100.4°F (38°C) or higher, or as directed by your healthcare provider  · Severe cramps in the lower left side of the abdomen or pain that is getting worse  · Tenderness in the lower left side of the abdomen or worsening pain throughout the abdomen  · Diarrhea or constipation that doesn't get better within 24 hours  · Nausea and vomiting  · Bleeding from the rectum  Call 911  Call emergency services if any of the following occur:  · Trouble breathing  · Confusion  · Very drowsy or trouble awakening  · Fainting or loss of consciousness  · Rapid heart rate  · Chest pain  Date Last Reviewed: 12/30/2015 © 2000-2017 [employer]. [address] [place] [address], [place] [address]. All rights reserved. This information is not intended as a substitute for professional medical care. Always follow your healthcare professional's instructions.

## 2020-12-01 NOTE — ANESTHESIA PREPROCEDURE EVALUATION
12/01/2020  Luiz Ybarra is a 40 y.o., male.    Anesthesia Evaluation    I have reviewed the Patient Summary Reports.    I have reviewed the Nursing Notes. I have reviewed the NPO Status.   I have reviewed the Medications.     Review of Systems  Anesthesia Hx:  No problems with previous Anesthesia  Denies Family Hx of Anesthesia complications.   Denies Personal Hx of Anesthesia complications.   Social:  Non-Smoker    Hematology/Oncology:  Hematology Normal   Oncology Normal     EENT/Dental:EENT/Dental Normal   Cardiovascular:   Exercise tolerance: good ECG has been reviewed.    Pulmonary:  Pulmonary Normal    Renal/:   Chronic Renal Disease, CRI    Hepatic/GI:  Hepatic/GI Normal    Musculoskeletal:  Musculoskeletal Normal    Neurological:  Neurology Normal    Endocrine:  Endocrine Normal    Dermatological:  Skin Normal    Psych:  Psychiatric Normal           Physical Exam  General:  Well nourished    Airway/Jaw/Neck:  Airway Findings: Mouth Opening: Normal Tongue: Normal  General Airway Assessment: Adult  Mallampati: II  TM Distance: Normal, at least 6 cm  Jaw/Neck Findings:  Neck ROM: Normal ROM      Dental:  Dental Findings: In tactPt denies loose or removable dentition    Chest/Lungs:  Chest/Lungs Findings: Clear to auscultation, Normal Respiratory Rate     Heart/Vascular:  Heart Findings: Rate: Normal  Rhythm: Regular Rhythm             Anesthesia Plan  Type of Anesthesia, risks & benefits discussed:  Anesthesia Type:  MAC  Patient's Preference:   Intra-op Monitoring Plan: standard ASA monitors  Intra-op Monitoring Plan Comments:   Post Op Pain Control Plan: multimodal analgesia  Post Op Pain Control Plan Comments:   Induction:   IV  Beta Blocker:  Patient is not currently on a Beta-Blocker (No further documentation required).       Informed Consent: Patient understands risks and agrees with  Anesthesia plan.  Questions answered. Anesthesia consent signed with patient.  ASA Score: 2     Day of Surgery Review of History & Physical: I have interviewed and examined the patient. I have reviewed the patient's H&P dated:  There are no significant changes.          Ready For Surgery From Anesthesia Perspective.

## 2020-12-01 NOTE — ANESTHESIA POSTPROCEDURE EVALUATION
Anesthesia Post Evaluation    Patient: Luiz Ybarra    Procedure(s) Performed: Procedure(s) (LRB):  COLONOSCOPY (N/A)    Final Anesthesia Type: MAC    Patient location during evaluation: GI PACU  Patient participation: Yes- Able to Participate  Level of consciousness: awake and alert  Post-procedure vital signs: reviewed and stable  Pain management: adequate  Airway patency: patent    PONV status at discharge: No PONV  Anesthetic complications: no      Cardiovascular status: blood pressure returned to baseline and hemodynamically stable  Respiratory status: unassisted, spontaneous ventilation and room air  Hydration status: euvolemic  Follow-up not needed.          Vitals Value Taken Time   /77 12/01/20 0725   Temp 36.5 °C (97.7 °F) 12/01/20 0715   Pulse 70 12/01/20 0734   Resp 17 12/01/20 0734   SpO2 100 % 12/01/20 0734         Event Time   Out of Recovery 07:50:16         Pain/Bernie Score: Bernie Score: 10 (12/1/2020  7:34 AM)

## 2020-12-01 NOTE — PLAN OF CARE
Dr Reilly came to bedside and discussed findings. NO N/V,  no abdominal pain, no GI bleeding, and vitals stable.  Pt discharged from unit.

## 2020-12-01 NOTE — H&P
PRE PROCEDURE H&P    Patient Name: Luiz Ybarra  MRN: [id number]  : 1980  Date of Procedure:  2020  Referring Physician: Mildred Fairbanks PA-C  Primary Physician: Rodrigo Hernandez MD  Procedure Physician: Sima Reilly MD       Planned Procedure: Colonoscopy  Diagnosis: hx of diverticultis  Chief Complaint: Same as above    HPI: Patient is an 40 y.o. male is here for the above. Seen by Mildred Fairbanks PA-C in 10/2020 for diverticulitis.This is his second episode. Last colonoscopy by Dr. Colón in . Only tics were seen. He has not been referred to a colorectal surgeon yet.     Last colonoscopy:   Family history: none  Anticoagulation: none    Past Medical History:   Past Medical History:   Diagnosis Date    Gout     Hypoglycemia     Nephrolithiasis         Past Surgical History:  Past Surgical History:   Procedure Laterality Date    COLONOSCOPY N/A 2017    Procedure: COLONOSCOPY;  Surgeon: Jovani Colón III, MD;  [facility];  Service: Endoscopy;  Laterality: N/A;    KNEE SURGERY          Home Medications:  Prior to Admission medications    Medication Sig Start Date End Date Taking? Authorizing Provider   finasteride (PROPECIA) 1 mg tablet Take 1 tablet (1 mg total) by mouth once daily. 19  Yes Rodrigo Hernandez MD   multivitamin capsule Take 1 capsule by mouth once daily.    Historical Provider   tamsulosin (FLOMAX) 0.4 mg Cap Take 1 capsule (0.4 mg total) by mouth once daily. 3/9/20 3/9/21  Romero Graham MD        Allergies:  Review of patient's allergies indicates:  No Known Allergies     Social History:   Social History     Socioeconomic History    Marital status:      Spouse name: Not on file    Number of children: Not on file    Years of education: Not on file    Highest education level: Not on file   Occupational History    Not on file   Social Needs    Financial resource strain: Not on file    Food insecurity     Worry: Not on file      "Inability: Not on file    Transportation needs     Medical: Not on file     Non-medical: Not on file   Tobacco Use    Smoking status: Never Smoker    Smokeless tobacco: Never Used   Substance and Sexual Activity    Alcohol use: No    Drug use: No    Sexual activity: Not on file   Lifestyle    Physical activity     Days per week: Not on file     Minutes per session: Not on file    Stress: Not on file   Relationships    Social connections     Talks on phone: Not on file     Gets together: Not on file     Attends Judaism service: Not on file     Active member of club or organization: Not on file     Attends meetings of clubs or organizations: Not on file     Relationship status: Not on file   Other Topics Concern    Not on file   Social History Narrative    Not on file       Family History:  Family History   Problem Relation Age of Onset    Diabetes Father     Diabetes Sister     Colon polyps Sister     Diabetes Maternal Grandmother     Colon cancer Maternal Grandmother     Colon polyps Maternal Grandmother     Glaucoma Maternal Grandmother     Diabetes Paternal Grandmother     Glaucoma Paternal Grandmother     Cancer Paternal Grandfather     Colon cancer Maternal Uncle     Colon polyps Mother     Heart disease Neg Hx        ROS: No acute cardiac events, no acute respiratory complaints.     Physical Exam (all patients):    /81 (BP Location: Left arm, Patient Position: Sitting)   Pulse 73   Temp 97.4 °F (36.3 °C) (Temporal)   Resp 18   Ht 5' 10" (1.778 m)   Wt 101 kg (222 lb 10.6 oz)   SpO2 99%   BMI 31.95 kg/m²   Lungs: Clear to auscultation bilaterally, respirations unlabored  Heart: Regular rate and rhythm, S1 and S2 normal, no obvious murmurs  Abdomen:         Soft, non-tender, bowel sounds normal, no masses, no organomegaly    Lab Results   Component Value Date    WBC 7.37 02/19/2019    MCV 88 02/19/2019    RDW 13.7 02/19/2019     02/19/2019    GLU 92 02/19/2019    " HGBA1C 4.8 02/19/2019    BUN 15 02/19/2019     02/19/2019    K 4.3 02/19/2019     02/19/2019        SEDATION PLAN: per anesthesia      History reviewed, vital signs satisfactory, cardiopulmonary status satisfactory, sedation options, risks and plans have been discussed with the patient  All their questions were answered and the patient agrees to the sedation procedures as planned and the patient is deemed an appropriate candidate for the sedation as planned.    The risks, benefits and alternatives of the procedure were discussed with the patient in detail. This discussion was had in the presence of endoscopy staff. The risks include, risks of adverse reaction to sedation requiring the use of reversal agents, bleeding requiring blood transfusion, perforation requiring surgical intervention and technical failure. Other risks include aspiration leading to respiratory distress and respiratory failure resulting in endotracheal intubation and mechanical ventilation including death. If anesthesia is being utilized for this procedure, it is up to the anesthesiologist to determine airway safety including elective endotracheal intubation. Questions were answered, they agree to proceed. There was no language barriers.     Procedure explained to patient, informed consent obtained and placed in chart.    Sima Reilly  12/1/2020  6:53 AM

## 2020-12-01 NOTE — TRANSFER OF CARE
"Anesthesia Transfer of Care Note    Patient: Luiz Ybarra    Procedure(s) Performed: Procedure(s) (LRB):  COLONOSCOPY (N/A)    Patient location: GI    Anesthesia Type: MAC    Transport from OR: Transported from OR on room air with adequate spontaneous ventilation    Post pain: adequate analgesia    Post assessment: no apparent anesthetic complications and tolerated procedure well    Post vital signs: stable    Level of consciousness: awake    Nausea/Vomiting: no nausea/vomiting    Complications: none    Transfer of care protocol was followed      Last vitals:   Visit Vitals  /81 (BP Location: Left arm, Patient Position: Sitting)   Pulse 73   Temp 36.3 °C (97.4 °F) (Temporal)   Resp 18   Ht 5' 10" (1.778 m)   Wt 101 kg (222 lb 10.6 oz)   SpO2 99%   BMI 31.95 kg/m²     "

## 2020-12-01 NOTE — TELEPHONE ENCOUNTER
Spoke to pt - Advised Dr Foster is only in clinic on Monday's - Advised that his current appt has been placed on our wait list incase someone cancels their appt his can be moved to an earlier date - PT verbalized understanding     ----- Message from Frances Rust sent at 12/1/2020  9:56 AM CST -----  Regarding: Schedule an appt for Diverticulitis  Appointment Access Request:    Pt called to schedule a consult for surgery visit for Diverticulitis and would like a call back today at [phone]. Pt stated he is being referred by Dr Sima Reilly

## 2020-12-21 RX ORDER — FINASTERIDE 1 MG/1
1 TABLET, FILM COATED ORAL DAILY
Qty: 30 TABLET | Refills: 11 | Status: SHIPPED | OUTPATIENT
Start: 2020-12-21 | End: 2022-01-24 | Stop reason: SDUPTHER

## 2020-12-27 ENCOUNTER — CLINICAL SUPPORT (OUTPATIENT)
Dept: URGENT CARE | Facility: CLINIC | Age: 40
End: 2020-12-27
Payer: COMMERCIAL

## 2020-12-27 DIAGNOSIS — Z11.9 ENCOUNTER FOR SCREENING EXAMINATION FOR INFECTIOUS DISEASE: Primary | ICD-10-CM

## 2020-12-27 LAB
CTP QC/QA: YES
SARS-COV-2 RDRP RESP QL NAA+PROBE: NEGATIVE

## 2020-12-27 PROCEDURE — 99211 OFF/OP EST MAY X REQ PHY/QHP: CPT | Mod: S$GLB,,, | Performed by: PHYSICIAN ASSISTANT

## 2020-12-27 PROCEDURE — 99211 PR OFFICE/OUTPT VISIT, EST, LEVL I: ICD-10-PCS | Mod: S$GLB,,, | Performed by: PHYSICIAN ASSISTANT

## 2020-12-27 PROCEDURE — U0002: ICD-10-PCS | Mod: QW,S$GLB,, | Performed by: PHYSICIAN ASSISTANT

## 2020-12-27 PROCEDURE — U0002 COVID-19 LAB TEST NON-CDC: HCPCS | Mod: QW,S$GLB,, | Performed by: PHYSICIAN ASSISTANT

## 2021-01-11 ENCOUNTER — OFFICE VISIT (OUTPATIENT)
Dept: SURGERY | Facility: CLINIC | Age: 41
End: 2021-01-11
Payer: COMMERCIAL

## 2021-01-11 VITALS
DIASTOLIC BLOOD PRESSURE: 84 MMHG | TEMPERATURE: 99 F | BODY MASS INDEX: 32.55 KG/M2 | HEART RATE: 68 BPM | SYSTOLIC BLOOD PRESSURE: 118 MMHG | WEIGHT: 226.88 LBS

## 2021-01-11 DIAGNOSIS — K59.00 CONSTIPATION, UNSPECIFIED CONSTIPATION TYPE: ICD-10-CM

## 2021-01-11 DIAGNOSIS — Z87.19 HISTORY OF DIVERTICULITIS OF COLON: Primary | ICD-10-CM

## 2021-01-11 PROCEDURE — 1126F PR PAIN SEVERITY QUANTIFIED, NO PAIN PRESENT: ICD-10-PCS | Mod: S$GLB,,, | Performed by: COLON & RECTAL SURGERY

## 2021-01-11 PROCEDURE — 99999 PR PBB SHADOW E&M-EST. PATIENT-LVL III: ICD-10-PCS | Mod: PBBFAC,,, | Performed by: COLON & RECTAL SURGERY

## 2021-01-11 PROCEDURE — 99244 PR OFFICE CONSULTATION,LEVEL IV: ICD-10-PCS | Mod: S$GLB,,, | Performed by: COLON & RECTAL SURGERY

## 2021-01-11 PROCEDURE — 1126F AMNT PAIN NOTED NONE PRSNT: CPT | Mod: S$GLB,,, | Performed by: COLON & RECTAL SURGERY

## 2021-01-11 PROCEDURE — 3008F PR BODY MASS INDEX (BMI) DOCUMENTED: ICD-10-PCS | Mod: CPTII,S$GLB,, | Performed by: COLON & RECTAL SURGERY

## 2021-01-11 PROCEDURE — 3008F BODY MASS INDEX DOCD: CPT | Mod: CPTII,S$GLB,, | Performed by: COLON & RECTAL SURGERY

## 2021-01-11 PROCEDURE — 99999 PR PBB SHADOW E&M-EST. PATIENT-LVL III: CPT | Mod: PBBFAC,,, | Performed by: COLON & RECTAL SURGERY

## 2021-01-11 PROCEDURE — 99244 OFF/OP CNSLTJ NEW/EST MOD 40: CPT | Mod: S$GLB,,, | Performed by: COLON & RECTAL SURGERY

## 2021-02-02 ENCOUNTER — OFFICE VISIT (OUTPATIENT)
Dept: INTERNAL MEDICINE | Facility: CLINIC | Age: 41
End: 2021-02-02
Payer: COMMERCIAL

## 2021-02-02 ENCOUNTER — LAB VISIT (OUTPATIENT)
Dept: LAB | Facility: HOSPITAL | Age: 41
End: 2021-02-02
Attending: FAMILY MEDICINE
Payer: COMMERCIAL

## 2021-02-02 VITALS
SYSTOLIC BLOOD PRESSURE: 108 MMHG | WEIGHT: 225.75 LBS | TEMPERATURE: 98 F | HEIGHT: 70 IN | DIASTOLIC BLOOD PRESSURE: 76 MMHG | BODY MASS INDEX: 32.32 KG/M2 | HEART RATE: 72 BPM

## 2021-02-02 DIAGNOSIS — Z00.00 ANNUAL PHYSICAL EXAM: Primary | ICD-10-CM

## 2021-02-02 DIAGNOSIS — Z00.00 ANNUAL PHYSICAL EXAM: ICD-10-CM

## 2021-02-02 LAB
BASOPHILS # BLD AUTO: 0.05 K/UL (ref 0–0.2)
BASOPHILS NFR BLD: 0.7 % (ref 0–1.9)
DIFFERENTIAL METHOD: ABNORMAL
EOSINOPHIL # BLD AUTO: 0.1 K/UL (ref 0–0.5)
EOSINOPHIL NFR BLD: 1 % (ref 0–8)
ERYTHROCYTE [DISTWIDTH] IN BLOOD BY AUTOMATED COUNT: 13.8 % (ref 11.5–14.5)
HCT VFR BLD AUTO: 49.7 % (ref 40–54)
HGB BLD-MCNC: 16.8 G/DL (ref 14–18)
IMM GRANULOCYTES # BLD AUTO: 0.04 K/UL (ref 0–0.04)
IMM GRANULOCYTES NFR BLD AUTO: 0.6 % (ref 0–0.5)
LYMPHOCYTES # BLD AUTO: 1.6 K/UL (ref 1–4.8)
LYMPHOCYTES NFR BLD: 23.6 % (ref 18–48)
MCH RBC QN AUTO: 29.6 PG (ref 27–31)
MCHC RBC AUTO-ENTMCNC: 33.8 G/DL (ref 32–36)
MCV RBC AUTO: 88 FL (ref 82–98)
MONOCYTES # BLD AUTO: 0.6 K/UL (ref 0.3–1)
MONOCYTES NFR BLD: 8.6 % (ref 4–15)
NEUTROPHILS # BLD AUTO: 4.5 K/UL (ref 1.8–7.7)
NEUTROPHILS NFR BLD: 65.5 % (ref 38–73)
NRBC BLD-RTO: 0 /100 WBC
PLATELET # BLD AUTO: 246 K/UL (ref 150–350)
PMV BLD AUTO: 9.7 FL (ref 9.2–12.9)
RBC # BLD AUTO: 5.67 M/UL (ref 4.6–6.2)
WBC # BLD AUTO: 6.83 K/UL (ref 3.9–12.7)

## 2021-02-02 PROCEDURE — 86803 HEPATITIS C AB TEST: CPT

## 2021-02-02 PROCEDURE — 90715 TDAP VACCINE GREATER THAN OR EQUAL TO 7YO IM: ICD-10-PCS | Mod: S$GLB,,, | Performed by: FAMILY MEDICINE

## 2021-02-02 PROCEDURE — 1126F AMNT PAIN NOTED NONE PRSNT: CPT | Mod: S$GLB,,, | Performed by: FAMILY MEDICINE

## 2021-02-02 PROCEDURE — 36415 COLL VENOUS BLD VENIPUNCTURE: CPT | Mod: PO

## 2021-02-02 PROCEDURE — 83036 HEMOGLOBIN GLYCOSYLATED A1C: CPT

## 2021-02-02 PROCEDURE — 90715 TDAP VACCINE 7 YRS/> IM: CPT | Mod: S$GLB,,, | Performed by: FAMILY MEDICINE

## 2021-02-02 PROCEDURE — 80061 LIPID PANEL: CPT

## 2021-02-02 PROCEDURE — 90471 IMMUNIZATION ADMIN: CPT | Mod: S$GLB,,, | Performed by: FAMILY MEDICINE

## 2021-02-02 PROCEDURE — 86703 HIV-1/HIV-2 1 RESULT ANTBDY: CPT

## 2021-02-02 PROCEDURE — 80053 COMPREHEN METABOLIC PANEL: CPT

## 2021-02-02 PROCEDURE — 85025 COMPLETE CBC W/AUTO DIFF WBC: CPT

## 2021-02-02 PROCEDURE — 1126F PR PAIN SEVERITY QUANTIFIED, NO PAIN PRESENT: ICD-10-PCS | Mod: S$GLB,,, | Performed by: FAMILY MEDICINE

## 2021-02-02 PROCEDURE — 99396 PREV VISIT EST AGE 40-64: CPT | Mod: 25,S$GLB,, | Performed by: FAMILY MEDICINE

## 2021-02-02 PROCEDURE — 99396 PR PREVENTIVE VISIT,EST,40-64: ICD-10-PCS | Mod: 25,S$GLB,, | Performed by: FAMILY MEDICINE

## 2021-02-02 PROCEDURE — 3008F PR BODY MASS INDEX (BMI) DOCUMENTED: ICD-10-PCS | Mod: CPTII,S$GLB,, | Performed by: FAMILY MEDICINE

## 2021-02-02 PROCEDURE — 3008F BODY MASS INDEX DOCD: CPT | Mod: CPTII,S$GLB,, | Performed by: FAMILY MEDICINE

## 2021-02-02 PROCEDURE — 84550 ASSAY OF BLOOD/URIC ACID: CPT

## 2021-02-02 PROCEDURE — 99999 PR PBB SHADOW E&M-EST. PATIENT-LVL III: ICD-10-PCS | Mod: PBBFAC,,, | Performed by: FAMILY MEDICINE

## 2021-02-02 PROCEDURE — 84443 ASSAY THYROID STIM HORMONE: CPT

## 2021-02-02 PROCEDURE — 90471 TDAP VACCINE GREATER THAN OR EQUAL TO 7YO IM: ICD-10-PCS | Mod: S$GLB,,, | Performed by: FAMILY MEDICINE

## 2021-02-02 PROCEDURE — 99999 PR PBB SHADOW E&M-EST. PATIENT-LVL III: CPT | Mod: PBBFAC,,, | Performed by: FAMILY MEDICINE

## 2021-02-03 LAB
ALBUMIN SERPL BCP-MCNC: 4.1 G/DL (ref 3.5–5.2)
ALP SERPL-CCNC: 76 U/L (ref 55–135)
ALT SERPL W/O P-5'-P-CCNC: 43 U/L (ref 10–44)
ANION GAP SERPL CALC-SCNC: 10 MMOL/L (ref 8–16)
AST SERPL-CCNC: 23 U/L (ref 10–40)
BILIRUB SERPL-MCNC: 0.6 MG/DL (ref 0.1–1)
BUN SERPL-MCNC: 16 MG/DL (ref 6–20)
CALCIUM SERPL-MCNC: 9.4 MG/DL (ref 8.7–10.5)
CHLORIDE SERPL-SCNC: 101 MMOL/L (ref 95–110)
CHOLEST SERPL-MCNC: 201 MG/DL (ref 120–199)
CHOLEST/HDLC SERPL: 3.9 {RATIO} (ref 2–5)
CO2 SERPL-SCNC: 27 MMOL/L (ref 23–29)
CREAT SERPL-MCNC: 0.9 MG/DL (ref 0.5–1.4)
EST. GFR  (AFRICAN AMERICAN): >60 ML/MIN/1.73 M^2
EST. GFR  (NON AFRICAN AMERICAN): >60 ML/MIN/1.73 M^2
ESTIMATED AVG GLUCOSE: 91 MG/DL (ref 68–131)
GLUCOSE SERPL-MCNC: 98 MG/DL (ref 70–110)
HBA1C MFR BLD: 4.8 % (ref 4–5.6)
HCV AB SERPL QL IA: NEGATIVE
HDLC SERPL-MCNC: 51 MG/DL (ref 40–75)
HDLC SERPL: 25.4 % (ref 20–50)
HIV 1+2 AB+HIV1 P24 AG SERPL QL IA: NEGATIVE
LDLC SERPL CALC-MCNC: 134.8 MG/DL (ref 63–159)
NONHDLC SERPL-MCNC: 150 MG/DL
POTASSIUM SERPL-SCNC: 4.5 MMOL/L (ref 3.5–5.1)
PROT SERPL-MCNC: 7.1 G/DL (ref 6–8.4)
SODIUM SERPL-SCNC: 138 MMOL/L (ref 136–145)
TRIGL SERPL-MCNC: 76 MG/DL (ref 30–150)
TSH SERPL DL<=0.005 MIU/L-ACNC: 1.17 UIU/ML (ref 0.4–4)
URATE SERPL-MCNC: 8.2 MG/DL (ref 3.4–7)

## 2021-03-18 ENCOUNTER — OFFICE VISIT (OUTPATIENT)
Dept: INTERNAL MEDICINE | Facility: CLINIC | Age: 41
End: 2021-03-18
Payer: COMMERCIAL

## 2021-03-18 VITALS
OXYGEN SATURATION: 97 % | DIASTOLIC BLOOD PRESSURE: 78 MMHG | HEART RATE: 83 BPM | TEMPERATURE: 100 F | WEIGHT: 222.25 LBS | SYSTOLIC BLOOD PRESSURE: 118 MMHG | BODY MASS INDEX: 31.82 KG/M2 | HEIGHT: 70 IN | RESPIRATION RATE: 16 BRPM

## 2021-03-18 DIAGNOSIS — R10.32 LLQ ABDOMINAL PAIN: ICD-10-CM

## 2021-03-18 DIAGNOSIS — Z87.19 HISTORY OF DIVERTICULITIS OF COLON: Primary | ICD-10-CM

## 2021-03-18 PROCEDURE — 3008F BODY MASS INDEX DOCD: CPT | Mod: CPTII,S$GLB,, | Performed by: NURSE PRACTITIONER

## 2021-03-18 PROCEDURE — 1125F PR PAIN SEVERITY QUANTIFIED, PAIN PRESENT: ICD-10-PCS | Mod: S$GLB,,, | Performed by: NURSE PRACTITIONER

## 2021-03-18 PROCEDURE — 99214 PR OFFICE/OUTPT VISIT, EST, LEVL IV, 30-39 MIN: ICD-10-PCS | Mod: S$GLB,,, | Performed by: NURSE PRACTITIONER

## 2021-03-18 PROCEDURE — 1125F AMNT PAIN NOTED PAIN PRSNT: CPT | Mod: S$GLB,,, | Performed by: NURSE PRACTITIONER

## 2021-03-18 PROCEDURE — 99999 PR PBB SHADOW E&M-EST. PATIENT-LVL III: CPT | Mod: PBBFAC,,, | Performed by: NURSE PRACTITIONER

## 2021-03-18 PROCEDURE — 99999 PR PBB SHADOW E&M-EST. PATIENT-LVL III: ICD-10-PCS | Mod: PBBFAC,,, | Performed by: NURSE PRACTITIONER

## 2021-03-18 PROCEDURE — 3008F PR BODY MASS INDEX (BMI) DOCUMENTED: ICD-10-PCS | Mod: CPTII,S$GLB,, | Performed by: NURSE PRACTITIONER

## 2021-03-18 PROCEDURE — 99214 OFFICE O/P EST MOD 30 MIN: CPT | Mod: S$GLB,,, | Performed by: NURSE PRACTITIONER

## 2021-03-18 RX ORDER — CIPROFLOXACIN 500 MG/1
500 TABLET ORAL 2 TIMES DAILY
Qty: 20 TABLET | Refills: 0 | Status: SHIPPED | OUTPATIENT
Start: 2021-03-18 | End: 2021-03-28

## 2021-03-18 RX ORDER — METRONIDAZOLE 500 MG/1
500 TABLET ORAL 3 TIMES DAILY
Qty: 30 TABLET | Refills: 0 | Status: SHIPPED | OUTPATIENT
Start: 2021-03-18 | End: 2021-03-28

## 2021-07-09 ENCOUNTER — OFFICE VISIT (OUTPATIENT)
Dept: URGENT CARE | Facility: CLINIC | Age: 41
End: 2021-07-09
Payer: COMMERCIAL

## 2021-07-09 VITALS
WEIGHT: 225 LBS | BODY MASS INDEX: 32.21 KG/M2 | HEART RATE: 92 BPM | SYSTOLIC BLOOD PRESSURE: 124 MMHG | OXYGEN SATURATION: 98 % | TEMPERATURE: 98 F | DIASTOLIC BLOOD PRESSURE: 78 MMHG | HEIGHT: 70 IN | RESPIRATION RATE: 20 BRPM

## 2021-07-09 DIAGNOSIS — R10.32 LLQ ABDOMINAL PAIN: Primary | ICD-10-CM

## 2021-07-09 DIAGNOSIS — Z87.19 HISTORY OF DIVERTICULITIS: ICD-10-CM

## 2021-07-09 PROCEDURE — 3008F BODY MASS INDEX DOCD: CPT | Mod: CPTII,S$GLB,, | Performed by: NURSE PRACTITIONER

## 2021-07-09 PROCEDURE — 1125F PR PAIN SEVERITY QUANTIFIED, PAIN PRESENT: ICD-10-PCS | Mod: S$GLB,,, | Performed by: NURSE PRACTITIONER

## 2021-07-09 PROCEDURE — 99214 PR OFFICE/OUTPT VISIT, EST, LEVL IV, 30-39 MIN: ICD-10-PCS | Mod: S$GLB,,, | Performed by: NURSE PRACTITIONER

## 2021-07-09 PROCEDURE — 1125F AMNT PAIN NOTED PAIN PRSNT: CPT | Mod: S$GLB,,, | Performed by: NURSE PRACTITIONER

## 2021-07-09 PROCEDURE — 99214 OFFICE O/P EST MOD 30 MIN: CPT | Mod: S$GLB,,, | Performed by: NURSE PRACTITIONER

## 2021-07-09 PROCEDURE — 3008F PR BODY MASS INDEX (BMI) DOCUMENTED: ICD-10-PCS | Mod: CPTII,S$GLB,, | Performed by: NURSE PRACTITIONER

## 2021-07-09 RX ORDER — METRONIDAZOLE 250 MG/1
500 TABLET ORAL EVERY 6 HOURS
COMMUNITY
End: 2022-03-07

## 2021-07-09 RX ORDER — CIPROFLOXACIN 500 MG/1
500 TABLET ORAL 2 TIMES DAILY
Qty: 20 TABLET | Refills: 0 | Status: SHIPPED | OUTPATIENT
Start: 2021-07-09 | End: 2021-07-19

## 2021-07-09 RX ORDER — METRONIDAZOLE 500 MG/1
500 TABLET ORAL EVERY 8 HOURS
Qty: 30 TABLET | Refills: 0 | Status: SHIPPED | OUTPATIENT
Start: 2021-07-09 | End: 2021-07-19

## 2021-07-12 ENCOUNTER — TELEPHONE (OUTPATIENT)
Dept: URGENT CARE | Facility: CLINIC | Age: 41
End: 2021-07-12

## 2021-07-15 ENCOUNTER — OFFICE VISIT (OUTPATIENT)
Dept: GASTROENTEROLOGY | Facility: CLINIC | Age: 41
End: 2021-07-15
Payer: COMMERCIAL

## 2021-07-15 VITALS
DIASTOLIC BLOOD PRESSURE: 88 MMHG | WEIGHT: 238.75 LBS | OXYGEN SATURATION: 99 % | HEIGHT: 70 IN | BODY MASS INDEX: 34.18 KG/M2 | SYSTOLIC BLOOD PRESSURE: 110 MMHG | HEART RATE: 80 BPM

## 2021-07-15 DIAGNOSIS — Z87.19 HISTORY OF COLONIC DIVERTICULITIS: Primary | ICD-10-CM

## 2021-07-15 PROCEDURE — 3008F BODY MASS INDEX DOCD: CPT | Mod: CPTII,S$GLB,, | Performed by: PHYSICIAN ASSISTANT

## 2021-07-15 PROCEDURE — 99213 PR OFFICE/OUTPT VISIT, EST, LEVL III, 20-29 MIN: ICD-10-PCS | Mod: S$GLB,,, | Performed by: PHYSICIAN ASSISTANT

## 2021-07-15 PROCEDURE — 99999 PR PBB SHADOW E&M-EST. PATIENT-LVL III: ICD-10-PCS | Mod: PBBFAC,,, | Performed by: PHYSICIAN ASSISTANT

## 2021-07-15 PROCEDURE — 3008F PR BODY MASS INDEX (BMI) DOCUMENTED: ICD-10-PCS | Mod: CPTII,S$GLB,, | Performed by: PHYSICIAN ASSISTANT

## 2021-07-15 PROCEDURE — 99213 OFFICE O/P EST LOW 20 MIN: CPT | Mod: S$GLB,,, | Performed by: PHYSICIAN ASSISTANT

## 2021-07-15 PROCEDURE — 99999 PR PBB SHADOW E&M-EST. PATIENT-LVL III: CPT | Mod: PBBFAC,,, | Performed by: PHYSICIAN ASSISTANT

## 2021-07-15 PROCEDURE — 1125F PR PAIN SEVERITY QUANTIFIED, PAIN PRESENT: ICD-10-PCS | Mod: S$GLB,,, | Performed by: PHYSICIAN ASSISTANT

## 2021-07-15 PROCEDURE — 1125F AMNT PAIN NOTED PAIN PRSNT: CPT | Mod: S$GLB,,, | Performed by: PHYSICIAN ASSISTANT

## 2021-07-19 ENCOUNTER — PATIENT MESSAGE (OUTPATIENT)
Dept: GASTROENTEROLOGY | Facility: CLINIC | Age: 41
End: 2021-07-19

## 2021-07-20 ENCOUNTER — PATIENT MESSAGE (OUTPATIENT)
Dept: GASTROENTEROLOGY | Facility: CLINIC | Age: 41
End: 2021-07-20

## 2021-07-22 ENCOUNTER — PATIENT MESSAGE (OUTPATIENT)
Dept: GASTROENTEROLOGY | Facility: CLINIC | Age: 41
End: 2021-07-22

## 2021-07-23 ENCOUNTER — TELEPHONE (OUTPATIENT)
Dept: SURGERY | Facility: CLINIC | Age: 41
End: 2021-07-23

## 2021-10-02 ENCOUNTER — PATIENT OUTREACH (OUTPATIENT)
Dept: ADMINISTRATIVE | Facility: OTHER | Age: 41
End: 2021-10-02

## 2021-10-04 ENCOUNTER — OFFICE VISIT (OUTPATIENT)
Dept: SURGERY | Facility: CLINIC | Age: 41
End: 2021-10-04
Payer: COMMERCIAL

## 2021-10-04 VITALS
HEIGHT: 70 IN | DIASTOLIC BLOOD PRESSURE: 80 MMHG | WEIGHT: 242.75 LBS | TEMPERATURE: 99 F | SYSTOLIC BLOOD PRESSURE: 115 MMHG | HEART RATE: 73 BPM | BODY MASS INDEX: 34.75 KG/M2

## 2021-10-04 DIAGNOSIS — Z87.19 HISTORY OF DIVERTICULITIS OF COLON: Primary | ICD-10-CM

## 2021-10-04 DIAGNOSIS — K59.00 CONSTIPATION, UNSPECIFIED CONSTIPATION TYPE: ICD-10-CM

## 2021-10-04 PROCEDURE — 1159F MED LIST DOCD IN RCRD: CPT | Mod: CPTII,S$GLB,, | Performed by: COLON & RECTAL SURGERY

## 2021-10-04 PROCEDURE — 3074F PR MOST RECENT SYSTOLIC BLOOD PRESSURE < 130 MM HG: ICD-10-PCS | Mod: CPTII,S$GLB,, | Performed by: COLON & RECTAL SURGERY

## 2021-10-04 PROCEDURE — 99214 OFFICE O/P EST MOD 30 MIN: CPT | Mod: S$GLB,,, | Performed by: COLON & RECTAL SURGERY

## 2021-10-04 PROCEDURE — 3074F SYST BP LT 130 MM HG: CPT | Mod: CPTII,S$GLB,, | Performed by: COLON & RECTAL SURGERY

## 2021-10-04 PROCEDURE — 3044F PR MOST RECENT HEMOGLOBIN A1C LEVEL <7.0%: ICD-10-PCS | Mod: CPTII,S$GLB,, | Performed by: COLON & RECTAL SURGERY

## 2021-10-04 PROCEDURE — 3079F PR MOST RECENT DIASTOLIC BLOOD PRESSURE 80-89 MM HG: ICD-10-PCS | Mod: CPTII,S$GLB,, | Performed by: COLON & RECTAL SURGERY

## 2021-10-04 PROCEDURE — 99999 PR PBB SHADOW E&M-EST. PATIENT-LVL III: CPT | Mod: PBBFAC,,, | Performed by: COLON & RECTAL SURGERY

## 2021-10-04 PROCEDURE — 1160F PR REVIEW ALL MEDS BY PRESCRIBER/CLIN PHARMACIST DOCUMENTED: ICD-10-PCS | Mod: CPTII,S$GLB,, | Performed by: COLON & RECTAL SURGERY

## 2021-10-04 PROCEDURE — 3044F HG A1C LEVEL LT 7.0%: CPT | Mod: CPTII,S$GLB,, | Performed by: COLON & RECTAL SURGERY

## 2021-10-04 PROCEDURE — 99999 PR PBB SHADOW E&M-EST. PATIENT-LVL III: ICD-10-PCS | Mod: PBBFAC,,, | Performed by: COLON & RECTAL SURGERY

## 2021-10-04 PROCEDURE — 3008F BODY MASS INDEX DOCD: CPT | Mod: CPTII,S$GLB,, | Performed by: COLON & RECTAL SURGERY

## 2021-10-04 PROCEDURE — 99214 PR OFFICE/OUTPT VISIT, EST, LEVL IV, 30-39 MIN: ICD-10-PCS | Mod: S$GLB,,, | Performed by: COLON & RECTAL SURGERY

## 2021-10-04 PROCEDURE — 3079F DIAST BP 80-89 MM HG: CPT | Mod: CPTII,S$GLB,, | Performed by: COLON & RECTAL SURGERY

## 2021-10-04 PROCEDURE — 3008F PR BODY MASS INDEX (BMI) DOCUMENTED: ICD-10-PCS | Mod: CPTII,S$GLB,, | Performed by: COLON & RECTAL SURGERY

## 2021-10-04 PROCEDURE — 1160F RVW MEDS BY RX/DR IN RCRD: CPT | Mod: CPTII,S$GLB,, | Performed by: COLON & RECTAL SURGERY

## 2021-10-04 PROCEDURE — 1159F PR MEDICATION LIST DOCUMENTED IN MEDICAL RECORD: ICD-10-PCS | Mod: CPTII,S$GLB,, | Performed by: COLON & RECTAL SURGERY

## 2021-12-16 ENCOUNTER — IMMUNIZATION (OUTPATIENT)
Dept: PHARMACY | Facility: CLINIC | Age: 41
End: 2021-12-16
Payer: COMMERCIAL

## 2021-12-16 DIAGNOSIS — Z23 NEED FOR VACCINATION: Primary | ICD-10-CM

## 2022-01-24 RX ORDER — FINASTERIDE 1 MG/1
1 TABLET, FILM COATED ORAL DAILY
Qty: 30 TABLET | Refills: 11 | Status: SHIPPED | OUTPATIENT
Start: 2022-01-24 | End: 2023-02-09 | Stop reason: SDUPTHER

## 2022-01-24 NOTE — TELEPHONE ENCOUNTER
No new care gaps identified.  Powered by [employer]. Reference number: [id number].   1/24/2022 8:27:52 AM CST

## 2022-03-07 ENCOUNTER — LAB VISIT (OUTPATIENT)
Dept: LAB | Facility: HOSPITAL | Age: 42
End: 2022-03-07
Payer: COMMERCIAL

## 2022-03-07 ENCOUNTER — OFFICE VISIT (OUTPATIENT)
Dept: INTERNAL MEDICINE | Facility: CLINIC | Age: 42
End: 2022-03-07
Payer: COMMERCIAL

## 2022-03-07 VITALS
WEIGHT: 243.63 LBS | DIASTOLIC BLOOD PRESSURE: 70 MMHG | OXYGEN SATURATION: 95 % | HEART RATE: 72 BPM | SYSTOLIC BLOOD PRESSURE: 130 MMHG | BODY MASS INDEX: 34.95 KG/M2 | TEMPERATURE: 98 F

## 2022-03-07 DIAGNOSIS — R06.83 SNORING: ICD-10-CM

## 2022-03-07 DIAGNOSIS — Z00.00 ANNUAL PHYSICAL EXAM: Primary | ICD-10-CM

## 2022-03-07 DIAGNOSIS — Z00.00 ANNUAL PHYSICAL EXAM: ICD-10-CM

## 2022-03-07 DIAGNOSIS — R40.0 DAYTIME SLEEPINESS: ICD-10-CM

## 2022-03-07 LAB
ALBUMIN SERPL BCP-MCNC: 3.9 G/DL (ref 3.5–5.2)
ALP SERPL-CCNC: 64 U/L (ref 55–135)
ALT SERPL W/O P-5'-P-CCNC: 38 U/L (ref 10–44)
ANION GAP SERPL CALC-SCNC: 9 MMOL/L (ref 8–16)
AST SERPL-CCNC: 25 U/L (ref 10–40)
BASOPHILS # BLD AUTO: 0.06 K/UL (ref 0–0.2)
BASOPHILS NFR BLD: 0.9 % (ref 0–1.9)
BILIRUB SERPL-MCNC: 0.7 MG/DL (ref 0.1–1)
BUN SERPL-MCNC: 13 MG/DL (ref 6–20)
CALCIUM SERPL-MCNC: 9.3 MG/DL (ref 8.7–10.5)
CHLORIDE SERPL-SCNC: 103 MMOL/L (ref 95–110)
CHOLEST SERPL-MCNC: 213 MG/DL (ref 120–199)
CHOLEST/HDLC SERPL: 4.1 {RATIO} (ref 2–5)
CO2 SERPL-SCNC: 28 MMOL/L (ref 23–29)
CREAT SERPL-MCNC: 0.9 MG/DL (ref 0.5–1.4)
DIFFERENTIAL METHOD: ABNORMAL
EOSINOPHIL # BLD AUTO: 0.1 K/UL (ref 0–0.5)
EOSINOPHIL NFR BLD: 0.9 % (ref 0–8)
ERYTHROCYTE [DISTWIDTH] IN BLOOD BY AUTOMATED COUNT: 14.2 % (ref 11.5–14.5)
EST. GFR  (AFRICAN AMERICAN): >60 ML/MIN/1.73 M^2
EST. GFR  (NON AFRICAN AMERICAN): >60 ML/MIN/1.73 M^2
ESTIMATED AVG GLUCOSE: 91 MG/DL (ref 68–131)
GLUCOSE SERPL-MCNC: 91 MG/DL (ref 70–110)
HBA1C MFR BLD: 4.8 % (ref 4–5.6)
HCT VFR BLD AUTO: 47.3 % (ref 40–54)
HDLC SERPL-MCNC: 52 MG/DL (ref 40–75)
HDLC SERPL: 24.4 % (ref 20–50)
HGB BLD-MCNC: 15.9 G/DL (ref 14–18)
IMM GRANULOCYTES # BLD AUTO: 0.02 K/UL (ref 0–0.04)
IMM GRANULOCYTES NFR BLD AUTO: 0.3 % (ref 0–0.5)
LDLC SERPL CALC-MCNC: 145.2 MG/DL (ref 63–159)
LYMPHOCYTES # BLD AUTO: 1.7 K/UL (ref 1–4.8)
LYMPHOCYTES NFR BLD: 26.1 % (ref 18–48)
MCH RBC QN AUTO: 30.1 PG (ref 27–31)
MCHC RBC AUTO-ENTMCNC: 33.6 G/DL (ref 32–36)
MCV RBC AUTO: 89 FL (ref 82–98)
MONOCYTES # BLD AUTO: 0.6 K/UL (ref 0.3–1)
MONOCYTES NFR BLD: 8.5 % (ref 4–15)
NEUTROPHILS # BLD AUTO: 4.2 K/UL (ref 1.8–7.7)
NEUTROPHILS NFR BLD: 63.3 % (ref 38–73)
NONHDLC SERPL-MCNC: 161 MG/DL
NRBC BLD-RTO: 0 /100 WBC
PLATELET # BLD AUTO: 249 K/UL (ref 150–450)
PMV BLD AUTO: 9.1 FL (ref 9.2–12.9)
POTASSIUM SERPL-SCNC: 4 MMOL/L (ref 3.5–5.1)
PROT SERPL-MCNC: 7 G/DL (ref 6–8.4)
RBC # BLD AUTO: 5.29 M/UL (ref 4.6–6.2)
SODIUM SERPL-SCNC: 140 MMOL/L (ref 136–145)
TRIGL SERPL-MCNC: 79 MG/DL (ref 30–150)
TSH SERPL DL<=0.005 MIU/L-ACNC: 0.92 UIU/ML (ref 0.4–4)
URATE SERPL-MCNC: 7.7 MG/DL (ref 3.4–7)
WBC # BLD AUTO: 6.59 K/UL (ref 3.9–12.7)

## 2022-03-07 PROCEDURE — 99396 PR PREVENTIVE VISIT,EST,40-64: ICD-10-PCS | Mod: S$GLB,,, | Performed by: FAMILY MEDICINE

## 2022-03-07 PROCEDURE — 1159F MED LIST DOCD IN RCRD: CPT | Mod: CPTII,S$GLB,, | Performed by: FAMILY MEDICINE

## 2022-03-07 PROCEDURE — 3008F PR BODY MASS INDEX (BMI) DOCUMENTED: ICD-10-PCS | Mod: CPTII,S$GLB,, | Performed by: FAMILY MEDICINE

## 2022-03-07 PROCEDURE — 3075F SYST BP GE 130 - 139MM HG: CPT | Mod: CPTII,S$GLB,, | Performed by: FAMILY MEDICINE

## 2022-03-07 PROCEDURE — 1160F PR REVIEW ALL MEDS BY PRESCRIBER/CLIN PHARMACIST DOCUMENTED: ICD-10-PCS | Mod: CPTII,S$GLB,, | Performed by: FAMILY MEDICINE

## 2022-03-07 PROCEDURE — 85025 COMPLETE CBC W/AUTO DIFF WBC: CPT | Performed by: FAMILY MEDICINE

## 2022-03-07 PROCEDURE — 3078F PR MOST RECENT DIASTOLIC BLOOD PRESSURE < 80 MM HG: ICD-10-PCS | Mod: CPTII,S$GLB,, | Performed by: FAMILY MEDICINE

## 2022-03-07 PROCEDURE — 99396 PREV VISIT EST AGE 40-64: CPT | Mod: S$GLB,,, | Performed by: FAMILY MEDICINE

## 2022-03-07 PROCEDURE — 84550 ASSAY OF BLOOD/URIC ACID: CPT | Performed by: FAMILY MEDICINE

## 2022-03-07 PROCEDURE — 99999 PR PBB SHADOW E&M-EST. PATIENT-LVL III: CPT | Mod: PBBFAC,,, | Performed by: FAMILY MEDICINE

## 2022-03-07 PROCEDURE — 84443 ASSAY THYROID STIM HORMONE: CPT | Performed by: FAMILY MEDICINE

## 2022-03-07 PROCEDURE — 1159F PR MEDICATION LIST DOCUMENTED IN MEDICAL RECORD: ICD-10-PCS | Mod: CPTII,S$GLB,, | Performed by: FAMILY MEDICINE

## 2022-03-07 PROCEDURE — 80053 COMPREHEN METABOLIC PANEL: CPT | Performed by: FAMILY MEDICINE

## 2022-03-07 PROCEDURE — 3075F PR MOST RECENT SYSTOLIC BLOOD PRESS GE 130-139MM HG: ICD-10-PCS | Mod: CPTII,S$GLB,, | Performed by: FAMILY MEDICINE

## 2022-03-07 PROCEDURE — 3078F DIAST BP <80 MM HG: CPT | Mod: CPTII,S$GLB,, | Performed by: FAMILY MEDICINE

## 2022-03-07 PROCEDURE — 99999 PR PBB SHADOW E&M-EST. PATIENT-LVL III: ICD-10-PCS | Mod: PBBFAC,,, | Performed by: FAMILY MEDICINE

## 2022-03-07 PROCEDURE — 36415 COLL VENOUS BLD VENIPUNCTURE: CPT | Mod: PO | Performed by: FAMILY MEDICINE

## 2022-03-07 PROCEDURE — 83036 HEMOGLOBIN GLYCOSYLATED A1C: CPT | Performed by: FAMILY MEDICINE

## 2022-03-07 PROCEDURE — 1160F RVW MEDS BY RX/DR IN RCRD: CPT | Mod: CPTII,S$GLB,, | Performed by: FAMILY MEDICINE

## 2022-03-07 PROCEDURE — 80061 LIPID PANEL: CPT | Performed by: FAMILY MEDICINE

## 2022-03-07 PROCEDURE — 3008F BODY MASS INDEX DOCD: CPT | Mod: CPTII,S$GLB,, | Performed by: FAMILY MEDICINE

## 2022-03-07 NOTE — PROGRESS NOTES
Subjective:      Patient ID: Luiz Ybarra is a 42 y.o. male.    Chief Complaint: Annual Exam and Insomnia (Pt states he is waking up a lot at night and not sleeping well)    HPI 42 y.o.   male patient with a PMHx of gout, hypoglycemia, nephrolithiasis presents to clinic for annual exam. The patient was last seen on February 2, 2021      Today he reports that he is doing okay. He recently returned from vacation. He endorses current abdominal pain. He also complaints of insomnia. He states that he is falls asleep well but he is unable to stay asleep.     Patient otherwise has no complaints.    Past Medical History:   Diagnosis Date    Gout     Hypoglycemia     Nephrolithiasis      Family History   Problem Relation Age of Onset    Diabetes Father     Diabetes Sister     Colon polyps Sister     Diabetes Maternal Grandmother     Colon cancer Maternal Grandmother     Colon polyps Maternal Grandmother     Glaucoma Maternal Grandmother     Diabetes Paternal Grandmother     Glaucoma Paternal Grandmother     Cancer Paternal Grandfather     Colon cancer Maternal Uncle     Colon polyps Mother     Heart disease Neg Hx      Past Surgical History:   Procedure Laterality Date    COLONOSCOPY N/A 11/6/2017    Procedure: COLONOSCOPY;  Surgeon: Jovani Colón III, MD;  Location: [facility];  Service: Endoscopy;  Laterality: N/A;    COLONOSCOPY N/A 12/1/2020    Procedure: COLONOSCOPY;  Surgeon: Sima Reilly MD;  Location: [facility];  Service: Endoscopy;  Laterality: N/A;    KNEE SURGERY       Social History     Tobacco Use    Smoking status: Never Smoker    Smokeless tobacco: Never Used   Substance Use Topics    Alcohol use: No    Drug use: No       /70   Pulse 72   Temp 97.6 °F (36.4 °C)   Wt 110.5 kg (243 lb 9.7 oz)   SpO2 95%   BMI 34.95 kg/m²     Review of Systems   Constitutional: Negative for activity change, appetite change, chills, diaphoresis, fatigue, fever and unexpected weight  change.   HENT: Negative for hearing loss, rhinorrhea, tinnitus and trouble swallowing.    Eyes: Negative for discharge and visual disturbance.   Respiratory: Negative for cough, chest tightness, shortness of breath and wheezing.    Cardiovascular: Negative for chest pain, palpitations and leg swelling.   Gastrointestinal: Positive for abdominal pain. Negative for abdominal distention, blood in stool, constipation, diarrhea and vomiting.   Endocrine: Negative for polydipsia and polyuria.   Genitourinary: Negative for difficulty urinating, dysuria, frequency, hematuria and urgency.   Musculoskeletal: Negative for arthralgias, back pain, joint swelling and neck pain.   Skin: Negative for color change and rash.   Neurological: Negative for weakness and headaches.   Hematological: Negative for adenopathy.   Psychiatric/Behavioral: Positive for sleep disturbance. Negative for confusion, decreased concentration and dysphoric mood.       Objective:     Physical Exam  Vitals and nursing note reviewed.   Constitutional:       General: He is not in acute distress.  HENT:      Right Ear: External ear normal.      Left Ear: External ear normal.      Nose: Nose normal.   Eyes:      Conjunctiva/sclera: Conjunctivae normal.      Pupils: Pupils are equal, round, and reactive to light.   Neck:      Vascular: No carotid bruit.   Cardiovascular:      Rate and Rhythm: Normal rate and regular rhythm.      Heart sounds: Normal heart sounds.   Pulmonary:      Effort: Pulmonary effort is normal. No respiratory distress.      Breath sounds: Normal breath sounds. No wheezing or rales.   Abdominal:      General: Bowel sounds are normal. There is no distension.      Palpations: Abdomen is soft.      Tenderness: There is no abdominal tenderness. There is no guarding.   Musculoskeletal:      Cervical back: Normal range of motion and neck supple.      Right lower leg: No edema.      Left lower leg: No edema.   Skin:     General: Skin is warm and  dry.      Findings: No rash.   Neurological:      Mental Status: He is alert and oriented to person, place, and time.   Psychiatric:         Behavior: Behavior normal.         Thought Content: Thought content normal.         Judgment: Judgment normal.         Lab Results   Component Value Date    WBC 6.83 02/02/2021    HGB 16.8 02/02/2021    HCT 49.7 02/02/2021     02/02/2021    CHOL 201 (H) 02/02/2021    TRIG 76 02/02/2021    HDL 51 02/02/2021    ALT 43 02/02/2021    AST 23 02/02/2021     02/02/2021    K 4.5 02/02/2021     02/02/2021    CREATININE 0.9 02/02/2021    BUN 16 02/02/2021    CO2 27 02/02/2021    TSH 1.170 02/02/2021    HGBA1C 4.8 02/02/2021       Assessment:     1. Annual physical exam    2. Snoring    3. Daytime sleepiness         Plan:     Annual physical exam  -     CBC Auto Differential; Future; Expected date: 03/07/2022  -     Comprehensive Metabolic Panel; Future; Expected date: 03/07/2022  -     Lipid Panel; Future; Expected date: 03/07/2022  -     Hemoglobin A1C; Future; Expected date: 03/07/2022  -     TSH; Future; Expected date: 03/07/2022  -     Uric Acid; Future; Expected date: 03/07/2022    Snoring    Daytime sleepiness  -     Home Sleep Studies; Future        Vitals reviewed and stable. BP within normal range at 130/70. Patient due for annual labs.     He is eligible for home sleep study//[place] [facility].     Patient overall doing well. Patient chronic conditions overall stable with no medications changes. He will continue with current regimen.       Questions and concerns addressed.    Focus on healthy diet/exercise  F/u annually and PRN        Documentation entered by Michael Tidwell, acting as scribe for Dr. Rodrigo Suazo. 03/07/2022 8:20 AM.

## 2022-03-09 ENCOUNTER — TELEPHONE (OUTPATIENT)
Dept: SLEEP MEDICINE | Facility: CLINIC | Age: 42
End: 2022-03-09
Payer: COMMERCIAL

## 2022-03-09 ENCOUNTER — PATIENT MESSAGE (OUTPATIENT)
Dept: INTERNAL MEDICINE | Facility: CLINIC | Age: 42
End: 2022-03-09
Payer: COMMERCIAL

## 2022-03-10 ENCOUNTER — TELEPHONE (OUTPATIENT)
Dept: INTERNAL MEDICINE | Facility: CLINIC | Age: 42
End: 2022-03-10
Payer: COMMERCIAL

## 2022-03-11 ENCOUNTER — TELEPHONE (OUTPATIENT)
Dept: SLEEP MEDICINE | Facility: HOSPITAL | Age: 42
End: 2022-03-11

## 2022-03-17 ENCOUNTER — PROCEDURE VISIT (OUTPATIENT)
Dept: SLEEP MEDICINE | Facility: CLINIC | Age: 42
End: 2022-03-17
Payer: COMMERCIAL

## 2022-03-17 DIAGNOSIS — R40.0 DAYTIME SLEEPINESS: ICD-10-CM

## 2022-03-17 PROCEDURE — 95806 SLEEP STUDY UNATT&RESP EFFT: CPT | Mod: 26,S$GLB,, | Performed by: INTERNAL MEDICINE

## 2022-03-17 PROCEDURE — 95806 PR SLEEP STUDY, UNATTENDED, SIMUL RECORD HR/O2 SAT/RESP FLOW/RESP EFFT: ICD-10-PCS | Mod: 26,S$GLB,, | Performed by: INTERNAL MEDICINE

## 2022-03-17 PROCEDURE — 95800 SLP STDY UNATTENDED: CPT

## 2022-03-17 NOTE — Clinical Note
1 night study MODERATE OBSTRUCTIVE SLEEP APNEA with overall AHI 19.6/hr ( 132 events): night #1 Oxygen desaturation: 82%. SpO2 between 90% to 94% for 24 min. Patient snored 99% time above 50 . Heart rate range: 56 bpm - 168 bpm REC's: Please refer to sleep disorders clinic for management Therapy with APAP at 4-20 cm WP using mask of choice with heated humidification is an option. Weight loss/management. with regular exercise per direction of physician. Avoid drowsy driving. Follow up in sleep clinic to maximize adherence and ensure resolution of symptoms.

## 2022-03-19 NOTE — PROCEDURES
Home Sleep Studies    Date/Time: 3/17/2022 8:00 AM  Performed by: Alfonso Patterson MD  Authorized by: Rodrigo Hernandez MD       1 night study  MODERATE OBSTRUCTIVE SLEEP APNEA with overall AHI 19.6/hr ( 132 events): night #1  Oxygen desaturation: 82%. SpO2 between 90% to 94% for 24 min.  Patient snored 99% time above 50 .  Heart rate range: 56 bpm - 168 bpm  REC's:  Please refer to sleep disorders clinic for management  Therapy with APAP at 4-20 cm WP using mask of choice with heated humidification is an option.  Weight loss/management. with regular exercise per direction of physician.  Avoid drowsy driving.  Follow up in sleep clinic to maximize adherence and ensure resolution of symptoms.

## 2022-03-21 ENCOUNTER — PATIENT MESSAGE (OUTPATIENT)
Dept: INTERNAL MEDICINE | Facility: CLINIC | Age: 42
End: 2022-03-21
Payer: COMMERCIAL

## 2022-03-21 DIAGNOSIS — G47.33 OSA (OBSTRUCTIVE SLEEP APNEA): Primary | ICD-10-CM

## 2022-03-22 ENCOUNTER — TELEPHONE (OUTPATIENT)
Dept: PULMONOLOGY | Facility: CLINIC | Age: 42
End: 2022-03-22
Payer: COMMERCIAL

## 2022-03-22 ENCOUNTER — OFFICE VISIT (OUTPATIENT)
Dept: PULMONOLOGY | Facility: CLINIC | Age: 42
End: 2022-03-22
Payer: COMMERCIAL

## 2022-03-22 VITALS — BODY MASS INDEX: 33.21 KG/M2 | WEIGHT: 232 LBS | HEIGHT: 70 IN

## 2022-03-22 DIAGNOSIS — E66.09 CLASS 1 OBESITY DUE TO EXCESS CALORIES WITHOUT SERIOUS COMORBIDITY WITH BODY MASS INDEX (BMI) OF 33.0 TO 33.9 IN ADULT: ICD-10-CM

## 2022-03-22 DIAGNOSIS — G47.33 OSA (OBSTRUCTIVE SLEEP APNEA): Primary | ICD-10-CM

## 2022-03-22 PROBLEM — E66.811 CLASS 1 OBESITY DUE TO EXCESS CALORIES WITHOUT SERIOUS COMORBIDITY WITH BODY MASS INDEX (BMI) OF 30.0 TO 30.9 IN ADULT: Status: ACTIVE | Noted: 2022-03-22

## 2022-03-22 PROCEDURE — 1159F PR MEDICATION LIST DOCUMENTED IN MEDICAL RECORD: ICD-10-PCS | Mod: CPTII,95,, | Performed by: NURSE PRACTITIONER

## 2022-03-22 PROCEDURE — 1160F PR REVIEW ALL MEDS BY PRESCRIBER/CLIN PHARMACIST DOCUMENTED: ICD-10-PCS | Mod: CPTII,95,, | Performed by: NURSE PRACTITIONER

## 2022-03-22 PROCEDURE — 1160F RVW MEDS BY RX/DR IN RCRD: CPT | Mod: CPTII,95,, | Performed by: NURSE PRACTITIONER

## 2022-03-22 PROCEDURE — 99203 OFFICE O/P NEW LOW 30 MIN: CPT | Mod: 95,,, | Performed by: NURSE PRACTITIONER

## 2022-03-22 PROCEDURE — 3044F PR MOST RECENT HEMOGLOBIN A1C LEVEL <7.0%: ICD-10-PCS | Mod: CPTII,95,, | Performed by: NURSE PRACTITIONER

## 2022-03-22 PROCEDURE — 3008F PR BODY MASS INDEX (BMI) DOCUMENTED: ICD-10-PCS | Mod: CPTII,95,, | Performed by: NURSE PRACTITIONER

## 2022-03-22 PROCEDURE — 1159F MED LIST DOCD IN RCRD: CPT | Mod: CPTII,95,, | Performed by: NURSE PRACTITIONER

## 2022-03-22 PROCEDURE — 99203 PR OFFICE/OUTPT VISIT, NEW, LEVL III, 30-44 MIN: ICD-10-PCS | Mod: 95,,, | Performed by: NURSE PRACTITIONER

## 2022-03-22 PROCEDURE — 3044F HG A1C LEVEL LT 7.0%: CPT | Mod: CPTII,95,, | Performed by: NURSE PRACTITIONER

## 2022-03-22 PROCEDURE — 3008F BODY MASS INDEX DOCD: CPT | Mod: CPTII,95,, | Performed by: NURSE PRACTITIONER

## 2022-03-22 NOTE — PROGRESS NOTES
The patient location is: home  The chief complaint leading to consultation is: sleep apnea    Visit type: audiovisual    Face to Face time with patient: 3:16 - 3:37   30 minutes of total time spent on the encounter, which includes face to face time and non-face to face time preparing to see the patient (eg, review of tests), Obtaining and/or reviewing separately obtained history, Documenting clinical information in the electronic or other health record, Independently interpreting results (not separately reported) and communicating results to the patient/family/caregiver, or Care coordination (not separately reported).         Each patient to whom he or she provides medical services by telemedicine is:  (1) informed of the relationship between the physician and patient and the respective role of any other health care provider with respect to management of the patient; and (2) notified that he or she may decline to receive medical services by telemedicine and may withdraw from such care at any time.    Notes:     Subjective:      Patient ID: Luiz Ybarra is a 42 y.o. male.    Patient Active Problem List   Diagnosis    LLQ abdominal pain    Nephrolithiasis    Flank pain    History of diverticulitis of colon    JERRELL (obstructive sleep apnea)    Class 1 obesity due to excess calories without serious comorbidity with body mass index (BMI) of 33.0 to 33.9 in adult       he has been referred by Rodrigo Lopez MD for evaluation and management for   Chief Complaint   Patient presents with    Sleep Apnea       Chief Complaint: Sleep Apnea       HPI:  Luiz Ybarra is a 42 y.o. male presents to pulmonary clinic initial evaluation related to sleep apnea with review 3/17/2022 Home Sleep Study    1 night study  MODERATE OBSTRUCTIVE SLEEP APNEA with overall AHI 19.6/hr ( 132 events): night #1  Oxygen desaturation: 82%. SpO2 between 90% to 94% for 24 min.  Patient snored 99% time above 50 .  Heart  rate range: 56 bpm - 168 bpm    Orders Auto CPAP 5-20 cm with patient requested nasal mask    Previous Report Reviewed: lab reports and office notes     Past Medical History: The following portions of the patient's history were reviewed and updated as appropriate:   He  has a past surgical history that includes Knee surgery; Colonoscopy (N/A, 11/6/2017); and Colonoscopy (N/A, 12/1/2020).  His family history includes Cancer in his paternal grandfather; Colon cancer in his maternal grandmother and maternal uncle; Colon polyps in his maternal grandmother, mother, and sister; Diabetes in his father, maternal grandmother, paternal grandmother, and sister; Glaucoma in his maternal grandmother and paternal grandmother.  He  reports that he has never smoked. He has never used smokeless tobacco. He reports that he does not drink alcohol and does not use drugs.  He has a current medication list which includes the following prescription(s): finasteride.  He has No Known Allergies..    Review of Systems   Constitutional: Negative for fever, chills, weight loss, weight gain, activity change, appetite change, fatigue and night sweats.   HENT: Negative for postnasal drip, rhinorrhea, sinus pressure, voice change and congestion.    Eyes: Negative for redness and itching.   Respiratory: Negative for snoring, cough, sputum production, chest tightness, shortness of breath, wheezing, orthopnea, asthma nighttime symptoms, dyspnea on extertion, use of rescue inhaler and somnolence.    Cardiovascular: Negative.  Negative for chest pain, palpitations and leg swelling.   Genitourinary: Negative for difficulty urinating and hematuria.   Endocrine: Negative for cold intolerance and heat intolerance.    Musculoskeletal: Negative for arthralgias, gait problem, joint swelling and myalgias.   Skin: Negative.    Gastrointestinal: Negative for nausea, vomiting, abdominal pain and acid reflux.   Neurological: Negative for dizziness, weakness,  "light-headedness and headaches.   Hematological: Negative for adenopathy. No excessive bruising.   All other systems reviewed and are negative.       Objective:   Ht 5' 10" (1.778 m)   Wt 105.2 kg (232 lb)   BMI 33.29 kg/m²   Physical Exam  Vitals and nursing note reviewed.   Constitutional:       General: He is awake.      Appearance: Normal appearance. He is well-developed and well-groomed.   HENT:      Head: Normocephalic.   Eyes:      Conjunctiva/sclera: Conjunctivae normal.   Pulmonary:      Effort: Pulmonary effort is normal.   Neurological:      Mental Status: He is alert.   Psychiatric:         Mood and Affect: Mood normal.         Behavior: Behavior normal. Behavior is cooperative.         Thought Content: Thought content normal.         Judgment: Judgment normal.       Personal Diagnostic Review      Assessment:     1. JERRELL (obstructive sleep apnea)    2. Class 1 obesity due to excess calories without serious comorbidity with body mass index (BMI) of 33.0 to 33.9 in adult      Orders Placed This Encounter   Procedures    CPAP FOR HOME USE     3/17/2022 Home Sleep Study    1 night study  MODERATE OBSTRUCTIVE SLEEP APNEA with overall AHI 19.6/hr ( 132 events): night #1  Oxygen desaturation: 82%. SpO2 between 90% to 94% for 24 min.  Patient snored 99% time above 50 .   Heart rate range: 56 bpm - 168 bpm     Order Specific Question:   Length of need (1-99 months):     Answer:   99     Order Specific Question:   Type ():     Answer:   Auto CPAP     Order Specific Question:   Auto CPAP pressure setting range (cmH20):     Answer:   5-20     Order Specific Question:   Fulfillment Priority:     Answer:   Level 4:  all others     Order Specific Question:   Humidification ():     Answer:   Heated     Order Specific Question:   Choose ONE mask type and its corresponding cushions and/or pillows:     Answer:    Nasal Cushion Mask, 1 per 90 days:  Nasal Cushions, (6 per 90 days)     Comments:   " or mask of choice      Order Specific Question:   Choose EITHER Heated or Non-Heated Tubjing     Answer:    Non-Heated Tubing, 1 per 90 days     Order Specific Question:   Number of Days Needed:     Answer:   99     Order Specific Question:   All other supplies as needed as listed below:     Answer:    Headgear, 1 per 180 days     Order Specific Question:   All other supplies as needed as listed below:     Answer:    Chin Strap, 1 per 180 days     Order Specific Question:   All other supplies as needed as listed below:     Answer:    Disposable Filter, 6 per 90 days     Order Specific Question:   All other supplies as needed as listed below:     Answer:    Non-Disposable Filter, 1 per 180 days     Order Specific Question:   All other supplies as needed as listed below:     Answer:    Humidifier Chamber, 1 per 180 days     Medication List with Changes/Refills   Current Medications    FINASTERIDE (PROPECIA) 1 MG TABLET    Take 1 tablet (1 mg total) by mouth once daily.     Plan:   Discussed diagnosis, its evaluation, treatment and usual course. All questions answered.  Problem List Items Addressed This Visit     JERRELL (obstructive sleep apnea) - Primary    Overview     3/17/2022 Home Sleep Study    1 night study  MODERATE OBSTRUCTIVE SLEEP APNEA with overall AHI 19.6/hr ( 132 events): night #1  Oxygen desaturation: 82%. SpO2 between 90% to 94% for 24 min.  Patient snored 99% time above 50 .  Heart rate range: 56 bpm - 168 bpm  3/22/2022 order Auto CPAP 5-20 cm   Nasal mask   Follow up 31-90 days from obtaining CPAP for ILD.                Current Assessment & Plan       Begin auto CPAP 5-20 cm   Nasal mask requested by patient   Follow up 31-90 days from obtaining CPAP for ILD.    Gold standard treatment obstructive sleep apnea CPAP   Discussed alternate treatment inspire, oral appliance with sleep dentist. Patient request proceeding with Auto CPAP, not interested in CPAP titration,  prefers beginning APAP.                     Relevant Orders    CPAP FOR HOME USE    Class 1 obesity due to excess calories without serious comorbidity with body mass index (BMI) of 33.0 to 33.9 in adult    Current Assessment & Plan     Patient is engaged in weight loss  Wt Readings from Last 9 Encounters:   03/22/22 105.2 kg (232 lb)   03/07/22 110.5 kg (243 lb 9.7 oz)   10/04/21 110.1 kg (242 lb 11.6 oz)   07/15/21 108.3 kg (238 lb 12.1 oz)   07/09/21 102.1 kg (225 lb)   03/18/21 100.8 kg (222 lb 3.6 oz)   02/02/21 102.4 kg (225 lb 12 oz)   01/11/21 102.9 kg (226 lb 13.7 oz)   12/01/20 101 kg (222 lb 10.6 oz)   Body mass index is 33.29 kg/m².                     Follow up in about 4 months (around 7/22/2022) for CPAP compliance download after initial set up, pt requested telemed visit .

## 2022-03-22 NOTE — ASSESSMENT & PLAN NOTE
Begin auto CPAP 5-20 cm   Nasal mask requested by patient   Follow up 31-90 days from obtaining CPAP for ILD.    Gold standard treatment obstructive sleep apnea CPAP   Discussed alternate treatment inspire, oral appliance with sleep dentist. Patient request proceeding with Auto CPAP, not interested in CPAP titration, prefers beginning APAP.

## 2022-03-22 NOTE — ASSESSMENT & PLAN NOTE
Patient is engaged in weight loss  Wt Readings from Last 9 Encounters:   03/22/22 105.2 kg (232 lb)   03/07/22 110.5 kg (243 lb 9.7 oz)   10/04/21 110.1 kg (242 lb 11.6 oz)   07/15/21 108.3 kg (238 lb 12.1 oz)   07/09/21 102.1 kg (225 lb)   03/18/21 100.8 kg (222 lb 3.6 oz)   02/02/21 102.4 kg (225 lb 12 oz)   01/11/21 102.9 kg (226 lb 13.7 oz)   12/01/20 101 kg (222 lb 10.6 oz)   Body mass index is 33.29 kg/m².

## 2022-06-24 NOTE — TELEPHONE ENCOUNTER
Left voicemail for patient to return call for test results.   Oriented - self; Oriented - place; Oriented - time

## 2022-07-22 NOTE — PROGRESS NOTES
The patient location is: work in [place]     The chief complaint leading to consultation is: sleep apnea    Visit type: audiovisual    Face to Face time with patient: [phone]   21 minutes of total time spent on the encounter, which includes face to face time and non-face to face time preparing to see the patient (eg, review of tests), Obtaining and/or reviewing separately obtained history, Documenting clinical information in the electronic or other health record, Independently interpreting results (not separately reported) and communicating results to the patient/family/caregiver, or Care coordination (not separately reported).         Each patient to whom he or she provides medical services by telemedicine is:  (1) informed of the relationship between the physician and patient and the respective role of any other health care provider with respect to management of the patient; and (2) notified that he or she may decline to receive medical services by telemedicine and may withdraw from such care at any time.    Notes:     Subjective:      Patient ID: Luiz Ybarra is a 42 y.o. male.    Patient Active Problem List   Diagnosis    LLQ abdominal pain    Nephrolithiasis    Flank pain    History of diverticulitis of colon    JERRELL on CPAP    Class 1 obesity due to excess calories with serious comorbidity and body mass index (BMI) of 32.0 to 32.9 in adult       he has been referred by No ref. provider found for evaluation and management for   Chief Complaint   Patient presents with    Sleep Apnea       Chief Complaint: Sleep Apnea     HPI: Luiz Ybarra engaged in telemed visit initial compliance assessment JERRELL on CPAP.  He is on Auto CPAP of 5-20 cmH2O pressure which is optimally controlling sleep apnea with apneic index (AHI) 3.7 events an hour.   He is compliant with CPAP use. Complaince download today reveals 100% of days with greater than 4 hours of device use.   Patient reports benefit from  CPAP use and denies snoring and excessive daytime sleepiness.  Patient reports complaint of wanted to change out cpap mask, different nasal pillows. working with [facility] to change out mask. Nasal pillows mask is used. Changed to N30i prefers nasal pillows mask that goes over top of head.     3/17/2022 Home Sleep Study    1 night study  MODERATE OBSTRUCTIVE SLEEP APNEA with overall AHI 19.6/hr ( 132 events): night #1  Oxygen desaturation: 82%. SpO2 between 90% to 94% for 24 min.  Patient snored 99% time above 50 .  Heart rate range: 56 bpm - 168 bpm    Compliance  Payor Standard  Usage 06/18/2022 - 07/17/2022  Usage days 30/30 days (100%)  >= 4 hours 30 days (100%)  < 4 hours 0 days (0%)  Usage hours 205 hours 43 minutes  Average usage (total days) 6 hours 51 minutes  Average usage (days used) 6 hours 51 minutes  Median usage (days used) 6 hours 47 minutes  Total used hours (value since last reset - 07/17/2022) 609 hours  AirSense 11 AutoSet  Serial number [id number]  Mode AutoSet  Min Pressure 5 cmH2O  Max Pressure 20 cmH2O  EPR Fulltime  EPR level 3  Response Standard  Therapy  Pressure - cmH2O Median: 6.0 95th percentile: 7.9 Maximum: 8.9  Leaks - L/min Median: 0.8 95th percentile: 9.7 Maximum: 18.7  Events per hour AI: 2.6 HI: 0.1 AHI: 2.7  Apnea Index Central: 1.1 Obstructive: 1.3 Unknown: 0.2  RERA Index 0.0  Cheyne-Bueno respiration (average duration per night) 0 minutes (0%)  Usage - hours      Mead Sleepiness Scale   EPWORTH SLEEPINESS SCALE 7/25/2022 3/22/2022 3/11/2022 3/7/2022   Sitting and reading 1 0 2 2   Watching TV 0 0 2 3   Sitting, inactive in a public place (e.g. a theatre or a meeting) 0 0 1 1   As a passenger in a car for an hour without a break 0 0 0 0   Lying down to rest in the afternoon when circumstances permit 2 2 3 3   Sitting and talking to someone 0 0 0 0   Sitting quietly after a lunch without alcohol 0 0 1 2   In a car, while stopped for a few minutes in traffic 0 0 0 0    Total score 3 2 9 11        Previous Report Reviewed: lab reports and office notes     Past Medical History: The following portions of the patient's history were reviewed and updated as appropriate:   He  has a past surgical history that includes Knee surgery; Colonoscopy (N/A, 11/6/2017); and Colonoscopy (N/A, 12/1/2020).  His family history includes Cancer in his paternal grandfather; Colon cancer in his maternal grandmother and maternal uncle; Colon polyps in his maternal grandmother, mother, and sister; Diabetes in his father, maternal grandmother, paternal grandmother, and sister; Glaucoma in his maternal grandmother and paternal grandmother.  He  reports that he has never smoked. He has never used smokeless tobacco. He reports that he does not drink alcohol and does not use drugs.  He has a current medication list which includes the following prescription(s): finasteride.  He has No Known Allergies..    Review of Systems   Constitutional: Negative for fever, chills, weight loss, weight gain, activity change, appetite change, fatigue and night sweats.   HENT: Negative for postnasal drip, rhinorrhea, sinus pressure, voice change and congestion.    Eyes: Negative for redness and itching.   Respiratory: Negative for snoring, cough, sputum production, chest tightness, shortness of breath, wheezing, orthopnea, asthma nighttime symptoms, dyspnea on extertion, use of rescue inhaler and somnolence.    Cardiovascular: Negative.  Negative for chest pain, palpitations and leg swelling.   Genitourinary: Negative for difficulty urinating and hematuria.   Endocrine: Negative for cold intolerance and heat intolerance.    Musculoskeletal: Negative for arthralgias, gait problem, joint swelling and myalgias.   Skin: Negative.    Gastrointestinal: Negative for nausea, vomiting, abdominal pain and acid reflux.   Neurological: Negative for dizziness, weakness, light-headedness and headaches.   Hematological: Negative for  "adenopathy. No excessive bruising.   All other systems reviewed and are negative.       Objective:   Ht 5' 10" (1.778 m)   Wt 102.5 kg (226 lb)   BMI 32.43 kg/m²   Physical Exam  Vitals and nursing note reviewed.   Constitutional:       General: He is awake.      Appearance: Normal appearance. He is well-developed and well-groomed.   HENT:      Head: Normocephalic.   Eyes:      Conjunctiva/sclera: Conjunctivae normal.   Pulmonary:      Effort: Pulmonary effort is normal.   Neurological:      Mental Status: He is alert.   Psychiatric:         Mood and Affect: Mood normal.         Behavior: Behavior normal. Behavior is cooperative.         Thought Content: Thought content normal.         Judgment: Judgment normal.       Personal Diagnostic Review  CPAP download    Assessment:     1. JERRELL on CPAP    2. Class 1 obesity due to excess calories with serious comorbidity and body mass index (BMI) of 32.0 to 32.9 in adult      Orders Placed This Encounter   Procedures    CPAP/BIPAP SUPPLIES     Benefits and compliant  90 day supply. 4 refills  [facility]: [facility]     Order Specific Question:   Length of need (1-99 months):     Answer:   99     Order Specific Question:   Choose ONE mask type and its corresponding cushions and/or pillows:     Answer:    Nasal Mask, 1 per 90 days:  Nasal Cushions, (6 per 90 days):  Nasal Pillows, (6 per 90 days)     Order Specific Question:   Choose EITHER Heated or Non-Heated Tubjing     Answer:    Non-Heated Tubing, 1 per 90 days     Order Specific Question:   Number of Days Needed:     Answer:   99     Order Specific Question:   All other supplies as needed as listed below:     Answer:    Headgear, 1 per 180 days     Order Specific Question:   All other supplies as needed as listed below:     Answer:    Chin Strap, 1 per 180 days     Order Specific Question:   All other supplies as needed as listed below:     Answer:    Disposable Filter, 6 per 90 days     Order " Specific Question:   All other supplies as needed as listed below:     Answer:    Humidifier Chamber, 1 per 180 days     Medication List with Changes/Refills   Current Medications    FINASTERIDE (PROPECIA) 1 MG TABLET    Take 1 tablet (1 mg total) by mouth once daily.     Plan:   Discussed diagnosis, its evaluation, treatment and usual course. All questions answered.  Problem List Items Addressed This Visit     JERRELL on CPAP - Primary    Overview     3/17/2022 Home Sleep Study    1 night study  MODERATE OBSTRUCTIVE SLEEP APNEA with overall AHI 19.6/hr ( 132 events): night #1  Oxygen desaturation: 82%. SpO2 between 90% to 94% for 24 min.  Patient snored 99% time above 50 .  Heart rate range: 56 bpm - 168 bpm  3/22/2022 order Auto CPAP 5-20 cm   Nasal mask   Follow up 31-90 days from obtaining CPAP for ILD.                Relevant Orders    CPAP/BIPAP SUPPLIES    Class 1 obesity due to excess calories with serious comorbidity and body mass index (BMI) of 32.0 to 32.9 in adult    Current Assessment & Plan     Encouraged calorie reduction and 30 minutes of exercise daily. Discussed impact of obesity on general health.  Goal weight 205 lbs  Wt Readings from Last 9 Encounters:   07/25/22 102.5 kg (226 lb)   03/22/22 105.2 kg (232 lb)   03/07/22 110.5 kg (243 lb 9.7 oz)   10/04/21 110.1 kg (242 lb 11.6 oz)   07/15/21 108.3 kg (238 lb 12.1 oz)   07/09/21 102.1 kg (225 lb)   03/18/21 100.8 kg (222 lb 3.6 oz)   02/02/21 102.4 kg (225 lb 12 oz)   01/11/21 102.9 kg (226 lb 13.7 oz)     Body mass index is 32.43 kg/m².                 Follow up in about 6 months (around 1/25/2023) for CPAP 6 mo compliance download telemed visit .

## 2022-07-25 ENCOUNTER — OFFICE VISIT (OUTPATIENT)
Dept: PULMONOLOGY | Facility: CLINIC | Age: 42
End: 2022-07-25
Payer: COMMERCIAL

## 2022-07-25 ENCOUNTER — TELEPHONE (OUTPATIENT)
Dept: PULMONOLOGY | Facility: CLINIC | Age: 42
End: 2022-07-25
Payer: COMMERCIAL

## 2022-07-25 VITALS — HEIGHT: 70 IN | BODY MASS INDEX: 32.35 KG/M2 | WEIGHT: 226 LBS

## 2022-07-25 DIAGNOSIS — E66.09 CLASS 1 OBESITY DUE TO EXCESS CALORIES WITH SERIOUS COMORBIDITY AND BODY MASS INDEX (BMI) OF 32.0 TO 32.9 IN ADULT: ICD-10-CM

## 2022-07-25 DIAGNOSIS — G47.33 OSA ON CPAP: Primary | ICD-10-CM

## 2022-07-25 PROCEDURE — 3008F BODY MASS INDEX DOCD: CPT | Mod: CPTII,95,, | Performed by: NURSE PRACTITIONER

## 2022-07-25 PROCEDURE — 1159F PR MEDICATION LIST DOCUMENTED IN MEDICAL RECORD: ICD-10-PCS | Mod: CPTII,95,, | Performed by: NURSE PRACTITIONER

## 2022-07-25 PROCEDURE — 3008F PR BODY MASS INDEX (BMI) DOCUMENTED: ICD-10-PCS | Mod: CPTII,95,, | Performed by: NURSE PRACTITIONER

## 2022-07-25 PROCEDURE — 3044F HG A1C LEVEL LT 7.0%: CPT | Mod: CPTII,95,, | Performed by: NURSE PRACTITIONER

## 2022-07-25 PROCEDURE — 3044F PR MOST RECENT HEMOGLOBIN A1C LEVEL <7.0%: ICD-10-PCS | Mod: CPTII,95,, | Performed by: NURSE PRACTITIONER

## 2022-07-25 PROCEDURE — 99213 PR OFFICE/OUTPT VISIT, EST, LEVL III, 20-29 MIN: ICD-10-PCS | Mod: 95,,, | Performed by: NURSE PRACTITIONER

## 2022-07-25 PROCEDURE — 1160F RVW MEDS BY RX/DR IN RCRD: CPT | Mod: CPTII,95,, | Performed by: NURSE PRACTITIONER

## 2022-07-25 PROCEDURE — 1160F PR REVIEW ALL MEDS BY PRESCRIBER/CLIN PHARMACIST DOCUMENTED: ICD-10-PCS | Mod: CPTII,95,, | Performed by: NURSE PRACTITIONER

## 2022-07-25 PROCEDURE — 1159F MED LIST DOCD IN RCRD: CPT | Mod: CPTII,95,, | Performed by: NURSE PRACTITIONER

## 2022-07-25 PROCEDURE — 99213 OFFICE O/P EST LOW 20 MIN: CPT | Mod: 95,,, | Performed by: NURSE PRACTITIONER

## 2022-07-25 NOTE — ASSESSMENT & PLAN NOTE
Encouraged calorie reduction and 30 minutes of exercise daily. Discussed impact of obesity on general health.  Goal weight 205 lbs  Wt Readings from Last 9 Encounters:   07/25/22 102.5 kg (226 lb)   03/22/22 105.2 kg (232 lb)   03/07/22 110.5 kg (243 lb 9.7 oz)   10/04/21 110.1 kg (242 lb 11.6 oz)   07/15/21 108.3 kg (238 lb 12.1 oz)   07/09/21 102.1 kg (225 lb)   03/18/21 100.8 kg (222 lb 3.6 oz)   02/02/21 102.4 kg (225 lb 12 oz)   01/11/21 102.9 kg (226 lb 13.7 oz)     Body mass index is 32.43 kg/m².

## 2023-02-09 ENCOUNTER — PATIENT MESSAGE (OUTPATIENT)
Dept: INTERNAL MEDICINE | Facility: CLINIC | Age: 43
End: 2023-02-09

## 2023-02-09 ENCOUNTER — HOSPITAL ENCOUNTER (OUTPATIENT)
Dept: CARDIOLOGY | Facility: HOSPITAL | Age: 43
Discharge: HOME | End: 2023-02-09
Payer: COMMERCIAL

## 2023-02-09 ENCOUNTER — OFFICE VISIT (OUTPATIENT)
Dept: INTERNAL MEDICINE | Facility: CLINIC | Age: 43
End: 2023-02-09
Payer: COMMERCIAL

## 2023-02-09 VITALS
SYSTOLIC BLOOD PRESSURE: 130 MMHG | TEMPERATURE: 98 F | BODY MASS INDEX: 30.02 KG/M2 | HEIGHT: 70 IN | HEART RATE: 68 BPM | DIASTOLIC BLOOD PRESSURE: 80 MMHG | WEIGHT: 209.69 LBS

## 2023-02-09 DIAGNOSIS — Z00.00 ANNUAL PHYSICAL EXAM: Primary | ICD-10-CM

## 2023-02-09 DIAGNOSIS — R00.2 PALPITATIONS: Primary | ICD-10-CM

## 2023-02-09 PROCEDURE — 3075F SYST BP GE 130 - 139MM HG: CPT | Mod: CPTII,S$GLB,, | Performed by: FAMILY MEDICINE

## 2023-02-09 PROCEDURE — 3079F PR MOST RECENT DIASTOLIC BLOOD PRESSURE 80-89 MM HG: ICD-10-PCS | Mod: CPTII,S$GLB,, | Performed by: FAMILY MEDICINE

## 2023-02-09 PROCEDURE — 3079F DIAST BP 80-89 MM HG: CPT | Mod: CPTII,S$GLB,, | Performed by: FAMILY MEDICINE

## 2023-02-09 PROCEDURE — 99396 PR PREVENTIVE VISIT,EST,40-64: ICD-10-PCS | Mod: S$GLB,,, | Performed by: FAMILY MEDICINE

## 2023-02-09 PROCEDURE — 1160F PR REVIEW ALL MEDS BY PRESCRIBER/CLIN PHARMACIST DOCUMENTED: ICD-10-PCS | Mod: CPTII,S$GLB,, | Performed by: FAMILY MEDICINE

## 2023-02-09 PROCEDURE — 1159F PR MEDICATION LIST DOCUMENTED IN MEDICAL RECORD: ICD-10-PCS | Mod: CPTII,S$GLB,, | Performed by: FAMILY MEDICINE

## 2023-02-09 PROCEDURE — 99999 PR PBB SHADOW E&M-EST. PATIENT-LVL III: CPT | Mod: PBBFAC,,, | Performed by: FAMILY MEDICINE

## 2023-02-09 PROCEDURE — 3008F BODY MASS INDEX DOCD: CPT | Mod: CPTII,S$GLB,, | Performed by: FAMILY MEDICINE

## 2023-02-09 PROCEDURE — 93010 ELECTROCARDIOGRAM REPORT: CPT | Mod: S$GLB,,, | Performed by: INTERNAL MEDICINE

## 2023-02-09 PROCEDURE — 93005 ELECTROCARDIOGRAM TRACING: CPT | Mod: PO

## 2023-02-09 PROCEDURE — 1159F MED LIST DOCD IN RCRD: CPT | Mod: CPTII,S$GLB,, | Performed by: FAMILY MEDICINE

## 2023-02-09 PROCEDURE — 93010 EKG 12-LEAD: ICD-10-PCS | Mod: S$GLB,,, | Performed by: INTERNAL MEDICINE

## 2023-02-09 PROCEDURE — 99999 PR PBB SHADOW E&M-EST. PATIENT-LVL III: ICD-10-PCS | Mod: PBBFAC,,, | Performed by: FAMILY MEDICINE

## 2023-02-09 PROCEDURE — 99396 PREV VISIT EST AGE 40-64: CPT | Mod: S$GLB,,, | Performed by: FAMILY MEDICINE

## 2023-02-09 PROCEDURE — 3008F PR BODY MASS INDEX (BMI) DOCUMENTED: ICD-10-PCS | Mod: CPTII,S$GLB,, | Performed by: FAMILY MEDICINE

## 2023-02-09 PROCEDURE — 1160F RVW MEDS BY RX/DR IN RCRD: CPT | Mod: CPTII,S$GLB,, | Performed by: FAMILY MEDICINE

## 2023-02-09 PROCEDURE — 3075F PR MOST RECENT SYSTOLIC BLOOD PRESS GE 130-139MM HG: ICD-10-PCS | Mod: CPTII,S$GLB,, | Performed by: FAMILY MEDICINE

## 2023-02-09 RX ORDER — FINASTERIDE 1 MG/1
1 TABLET, FILM COATED ORAL DAILY
Qty: 90 TABLET | Refills: 3 | Status: SHIPPED | OUTPATIENT
Start: 2023-02-09 | End: 2024-02-27 | Stop reason: SDUPTHER

## 2023-02-09 NOTE — PROGRESS NOTES
"Subjective:      Patient ID: Luiz Ybarra is a 43 y.o. male.    Chief Complaint: Annual Exam    HPI 43 y.o.   male patient with a PMHx of gout, hypoglycemia, and nephrolithiasis presents to clinic for annual exam. The patient was last seen on March 7, 2022      Today he reports he have been having Mounjaro 2.5 mg for 2 months but have not tried it yet. Patient reports he changed his eating habits. He states he have been feeling flutters in chest. Patient declines palpitations. Patient otherwise without complaints. Denies SOB, chest pain, and bowel changes.        Past Medical History:   Diagnosis Date    Gout     Hypoglycemia     Nephrolithiasis      Family History   Problem Relation Age of Onset    Diabetes Father     Diabetes Sister     Colon polyps Sister     Diabetes Maternal Grandmother     Colon cancer Maternal Grandmother     Colon polyps Maternal Grandmother     Glaucoma Maternal Grandmother     Diabetes Paternal Grandmother     Glaucoma Paternal Grandmother     Cancer Paternal Grandfather     Colon cancer Maternal Uncle     Colon polyps Mother     Heart disease Neg Hx      Past Surgical History:   Procedure Laterality Date    COLONOSCOPY N/A 11/6/2017    Procedure: COLONOSCOPY;  Surgeon: Jovani Colón III, MD;  Location: [facility] ENDO;  Service: Endoscopy;  Laterality: N/A;    COLONOSCOPY N/A 12/1/2020    Procedure: COLONOSCOPY;  Surgeon: Sima Reilly MD;  Location: [facility] ENDO;  Service: Endoscopy;  Laterality: N/A;    KNEE SURGERY       Social History     Tobacco Use    Smoking status: Never    Smokeless tobacco: Never   Substance Use Topics    Alcohol use: No    Drug use: No       /80   Pulse 68   Temp 98.1 °F (36.7 °C)   Ht 5' 10" (1.778 m)   Wt 95.1 kg (209 lb 10.5 oz)   BMI 30.08 kg/m²     Review of Systems   Constitutional:  Negative for activity change, appetite change, chills, diaphoresis, fatigue, fever and unexpected weight change.   HENT:  Negative for hearing loss, " rhinorrhea and trouble swallowing.    Eyes:  Negative for discharge and visual disturbance.   Respiratory:  Negative for cough, chest tightness, shortness of breath and wheezing.    Cardiovascular:  Negative for chest pain, palpitations and leg swelling.   Gastrointestinal:  Negative for abdominal distention, abdominal pain, blood in stool, constipation, diarrhea and vomiting.   Endocrine: Negative for polydipsia and polyuria.   Genitourinary:  Negative for difficulty urinating, dysuria, frequency, hematuria and urgency.   Musculoskeletal:  Negative for arthralgias, back pain, joint swelling and neck pain.   Skin:  Negative for color change and rash.   Neurological:  Negative for weakness and headaches.   Hematological:  Negative for adenopathy.   Psychiatric/Behavioral:  Negative for confusion, decreased concentration and dysphoric mood.      Objective:     Physical Exam  Vitals and nursing note reviewed.   Constitutional:       General: He is not in acute distress.  HENT:      Right Ear: External ear normal.      Left Ear: External ear normal.      Nose: Nose normal.   Eyes:      Conjunctiva/sclera: Conjunctivae normal.      Pupils: Pupils are equal, round, and reactive to light.   Neck:      Vascular: No carotid bruit.   Cardiovascular:      Rate and Rhythm: Normal rate and regular rhythm.      Heart sounds: Normal heart sounds.   Pulmonary:      Effort: Pulmonary effort is normal. No respiratory distress.      Breath sounds: Normal breath sounds. No wheezing or rales.   Abdominal:      General: Bowel sounds are normal. There is no distension.      Palpations: Abdomen is soft.      Tenderness: There is no abdominal tenderness. There is no guarding.   Musculoskeletal:      Cervical back: Normal range of motion and neck supple.      Right lower leg: No edema.      Left lower leg: No edema.   Skin:     General: Skin is warm and dry.      Findings: No rash.   Neurological:      Mental Status: He is alert and oriented  to person, place, and time.   Psychiatric:         Behavior: Behavior normal.         Thought Content: Thought content normal.         Judgment: Judgment normal.       Lab Results   Component Value Date    WBC 6.59 03/07/2022    HGB 15.9 03/07/2022    HCT 47.3 03/07/2022     03/07/2022    CHOL 213 (H) 03/07/2022    TRIG 79 03/07/2022    HDL 52 03/07/2022    ALT 38 03/07/2022    AST 25 03/07/2022     03/07/2022    K 4.0 03/07/2022     03/07/2022    CREATININE 0.9 03/07/2022    BUN 13 03/07/2022    CO2 28 03/07/2022    TSH 0.919 03/07/2022    HGBA1C 4.8 03/07/2022       Assessment:     1. Annual physical exam         Plan:     Annual physical exam  -     CBC Auto Differential; Future; Expected date: 02/09/2023  -     Comprehensive Metabolic Panel; Future; Expected date: 02/09/2023  -     Hemoglobin A1C; Future; Expected date: 02/09/2023  -     Lipid Panel; Future; Expected date: 02/09/2023  -     TSH; Future; Expected date: 02/09/2023  -     EKG 12-lead    Other orders  -     finasteride (PROPECIA) 1 mg tablet; Take 1 tablet (1 mg total) by mouth once daily.  Dispense: 90 tablet; Refill: 3        Vitals reviewed. BP within normal range at 130/80    Labs and EKG will be updated today  Will see about CT Ca score test for screening  Patient declines influenza vaccine.  Propecia refilled//can use mounjaro  Patient overall doing well  Questions and concerns addressed.          Documentation entered by Branden Reeves, acting as scribe for Dr. Rodrigo Suazo. 02/09/2023 9:59 AM.

## 2023-02-10 ENCOUNTER — HOSPITAL ENCOUNTER (OUTPATIENT)
Dept: CARDIOLOGY | Facility: HOSPITAL | Age: 43
Discharge: HOME | End: 2023-02-10
Attending: FAMILY MEDICINE
Payer: COMMERCIAL

## 2023-02-10 VITALS
DIASTOLIC BLOOD PRESSURE: 80 MMHG | HEIGHT: 70 IN | WEIGHT: 209 LBS | BODY MASS INDEX: 29.92 KG/M2 | SYSTOLIC BLOOD PRESSURE: 130 MMHG

## 2023-02-10 DIAGNOSIS — R00.2 PALPITATIONS: ICD-10-CM

## 2023-02-10 LAB
AORTIC ROOT ANNULUS: 2.78 CM
ASCENDING AORTA: 2.51 CM
AV INDEX (PROSTH): 0.82
AV MEAN GRADIENT: 3 MMHG
AV PEAK GRADIENT: 5 MMHG
AV VALVE AREA: 2.9 CM2
AV VELOCITY RATIO: 0.78
BSA FOR ECHO PROCEDURE: 2.16 M2
CV ECHO LV RWT: 0.31 CM
DOP CALC AO PEAK VEL: 1.08 M/S
DOP CALC AO VTI: 24.2 CM
DOP CALC LVOT AREA: 3.5 CM2
DOP CALC LVOT DIAMETER: 2.12 CM
DOP CALC LVOT PEAK VEL: 0.84 M/S
DOP CALC LVOT STROKE VOLUME: 70.21 CM3
DOP CALC RVOT PEAK VEL: 0.67 M/S
DOP CALC RVOT VTI: 12.5 CM
DOP CALCLVOT PEAK VEL VTI: 19.9 CM
E WAVE DECELERATION TIME: 194.78 MSEC
E/A RATIO: 1.4
E/E' RATIO: 6.72 M/S
ECHO LV POSTERIOR WALL: 0.78 CM (ref 0.6–1.1)
EJECTION FRACTION: 60 %
FRACTIONAL SHORTENING: 37 % (ref 28–44)
INTERVENTRICULAR SEPTUM: 0.86 CM (ref 0.6–1.1)
IVC DIAMETER: 18 CM
IVRT: 85.63 MSEC
LA MAJOR: 4.66 CM
LA MINOR: 4.08 CM
LA WIDTH: 3.3 CM
LEFT ATRIUM SIZE: 3.53 CM
LEFT ATRIUM VOLUME INDEX MOD: 20 ML/M2
LEFT ATRIUM VOLUME INDEX: 20.2 ML/M2
LEFT ATRIUM VOLUME MOD: 42.54 CM3
LEFT ATRIUM VOLUME: 43.08 CM3
LEFT INTERNAL DIMENSION IN SYSTOLE: 3.13 CM (ref 2.1–4)
LEFT VENTRICLE DIASTOLIC VOLUME INDEX: 54.44 ML/M2
LEFT VENTRICLE DIASTOLIC VOLUME: 115.96 ML
LEFT VENTRICLE MASS INDEX: 65 G/M2
LEFT VENTRICLE SYSTOLIC VOLUME INDEX: 18.2 ML/M2
LEFT VENTRICLE SYSTOLIC VOLUME: 38.75 ML
LEFT VENTRICULAR INTERNAL DIMENSION IN DIASTOLE: 4.96 CM (ref 3.5–6)
LEFT VENTRICULAR MASS: 138.27 G
LV LATERAL E/E' RATIO: 6 M/S
LV SEPTAL E/E' RATIO: 7.64 M/S
LVOT MG: 1.54 MMHG
LVOT MV: 0.58 CM/S
MV PEAK A VEL: 0.6 M/S
MV PEAK E VEL: 0.84 M/S
MV STENOSIS PRESSURE HALF TIME: 56.49 MS
MV VALVE AREA P 1/2 METHOD: 3.89 CM2
PISA TR MAX VEL: 2.11 M/S
PULM VEIN S/D RATIO: 1.48
PV MEAN GRADIENT: 0.84 MMHG
PV PEAK D VEL: 0.42 M/S
PV PEAK S VEL: 0.62 M/S
PV PEAK VELOCITY: 1.21 CM/S
RA MAJOR: 4.15 CM
RA PRESSURE: 3 MMHG
RA WIDTH: 3.56 CM
RIGHT VENTRICULAR END-DIASTOLIC DIMENSION: 3.61 CM
SINUS: 2.78 CM
STJ: 2.48 CM
TDI LATERAL: 0.14 M/S
TDI SEPTAL: 0.11 M/S
TDI: 0.13 M/S
TR MAX PG: 18 MMHG
TRICUSPID ANNULAR PLANE SYSTOLIC EXCURSION: 2.77 CM
TV REST PULMONARY ARTERY PRESSURE: 21 MMHG

## 2023-02-10 PROCEDURE — 93306 ECHO (CUPID ONLY): ICD-10-PCS | Mod: 26,,, | Performed by: INTERNAL MEDICINE

## 2023-02-10 PROCEDURE — 93306 TTE W/DOPPLER COMPLETE: CPT | Mod: 26,,, | Performed by: INTERNAL MEDICINE

## 2023-02-10 PROCEDURE — 93306 TTE W/DOPPLER COMPLETE: CPT

## 2023-02-26 ENCOUNTER — PATIENT MESSAGE (OUTPATIENT)
Dept: INTERNAL MEDICINE | Facility: CLINIC | Age: 43
End: 2023-02-26
Payer: COMMERCIAL

## 2023-02-27 ENCOUNTER — PATIENT MESSAGE (OUTPATIENT)
Dept: INTERNAL MEDICINE | Facility: CLINIC | Age: 43
End: 2023-02-27
Payer: COMMERCIAL

## 2023-02-27 RX ORDER — TIRZEPATIDE 5 MG/.5ML
5 INJECTION, SOLUTION SUBCUTANEOUS
Qty: 4 PEN | Refills: 0 | Status: SHIPPED | OUTPATIENT
Start: 2023-02-27 | End: 2023-03-01

## 2023-02-27 NOTE — TELEPHONE ENCOUNTER
No new care gaps identified.  [facility] Embedded Care Gaps. Reference number: [id number]. 2/27/2023   9:43:42 AM CST

## 2023-03-01 ENCOUNTER — PATIENT MESSAGE (OUTPATIENT)
Dept: INTERNAL MEDICINE | Facility: CLINIC | Age: 43
End: 2023-03-01
Payer: COMMERCIAL

## 2023-03-01 DIAGNOSIS — Q82.8 ACCESSORY SKIN TAGS: Primary | ICD-10-CM

## 2023-03-01 RX ORDER — SEMAGLUTIDE 1.34 MG/ML
0.5 INJECTION, SOLUTION SUBCUTANEOUS
Qty: 3 PEN | Refills: 0 | Status: SHIPPED | OUTPATIENT
Start: 2023-03-01 | End: 2023-03-02 | Stop reason: SDUPTHER

## 2023-03-02 ENCOUNTER — PATIENT MESSAGE (OUTPATIENT)
Dept: INTERNAL MEDICINE | Facility: CLINIC | Age: 43
End: 2023-03-02
Payer: COMMERCIAL

## 2023-03-02 ENCOUNTER — TELEPHONE (OUTPATIENT)
Dept: PHARMACY | Facility: CLINIC | Age: 43
End: 2023-03-02
Payer: COMMERCIAL

## 2023-03-02 RX ORDER — SEMAGLUTIDE 1.34 MG/ML
0.5 INJECTION, SOLUTION SUBCUTANEOUS
Qty: 3 PEN | Refills: 0 | Status: SHIPPED | OUTPATIENT
Start: 2023-03-02 | End: 2023-03-03

## 2023-03-02 NOTE — TELEPHONE ENCOUNTER
Refill Routing Note   Medication(s) are not appropriate for processing by [facility] for the following reason(s):       Other    [facility] action(s):  Defer    Medication Therapy Plan: Pharmacy requesting alternative    Appointments  past 12m or future 3m with PCP    Date Provider   Last Visit   2/9/2023 Rodrigo Hernandez MD   Next Visit   Visit date not found Rodrigo Hernandez MD   ED visits in past 90 days: 0        Note composed:4:32 PM 03/02/2023

## 2023-03-02 NOTE — TELEPHONE ENCOUNTER
No new care gaps identified.  [facility] Embedded Care Gaps. Reference number: [id number]. 3/02/2023   3:51:44 PM CST

## 2023-03-03 ENCOUNTER — PATIENT MESSAGE (OUTPATIENT)
Dept: INTERNAL MEDICINE | Facility: CLINIC | Age: 43
End: 2023-03-03
Payer: COMMERCIAL

## 2023-03-03 RX ORDER — SEMAGLUTIDE 1.34 MG/ML
0.5 INJECTION, SOLUTION SUBCUTANEOUS
Refills: 0 | OUTPATIENT
Start: 2023-03-03 | End: 2023-06-01

## 2023-03-29 ENCOUNTER — PATIENT MESSAGE (OUTPATIENT)
Dept: INTERNAL MEDICINE | Facility: CLINIC | Age: 43
End: 2023-03-29
Payer: COMMERCIAL

## 2023-03-29 RX ORDER — CIPROFLOXACIN 500 MG/1
500 TABLET ORAL EVERY 12 HOURS
Qty: 20 TABLET | Refills: 0 | Status: SHIPPED | OUTPATIENT
Start: 2023-03-29 | End: 2023-04-08

## 2023-03-29 RX ORDER — METRONIDAZOLE 500 MG/1
500 TABLET ORAL EVERY 8 HOURS
Qty: 30 TABLET | Refills: 0 | Status: SHIPPED | OUTPATIENT
Start: 2023-03-29 | End: 2023-04-08

## 2023-04-20 ENCOUNTER — OFFICE VISIT (OUTPATIENT)
Dept: DERMATOLOGY | Facility: CLINIC | Age: 43
End: 2023-04-20
Payer: COMMERCIAL

## 2023-04-20 DIAGNOSIS — Z12.83 SCREENING, MALIGNANT NEOPLASM, SKIN: ICD-10-CM

## 2023-04-20 DIAGNOSIS — L57.0 ACTINIC KERATOSIS: ICD-10-CM

## 2023-04-20 DIAGNOSIS — L91.8 INFLAMED SKIN TAG: Primary | ICD-10-CM

## 2023-04-20 DIAGNOSIS — Q82.8 ACCESSORY SKIN TAGS: ICD-10-CM

## 2023-04-20 PROCEDURE — 17110 DESTRUCTION B9 LES UP TO 14: CPT | Mod: S$GLB,,, | Performed by: STUDENT IN AN ORGANIZED HEALTH CARE EDUCATION/TRAINING PROGRAM

## 2023-04-20 PROCEDURE — 3044F HG A1C LEVEL LT 7.0%: CPT | Mod: CPTII,S$GLB,, | Performed by: STUDENT IN AN ORGANIZED HEALTH CARE EDUCATION/TRAINING PROGRAM

## 2023-04-20 PROCEDURE — 99999 PR PBB SHADOW E&M-EST. PATIENT-LVL III: ICD-10-PCS | Mod: PBBFAC,,, | Performed by: STUDENT IN AN ORGANIZED HEALTH CARE EDUCATION/TRAINING PROGRAM

## 2023-04-20 PROCEDURE — 1159F PR MEDICATION LIST DOCUMENTED IN MEDICAL RECORD: ICD-10-PCS | Mod: CPTII,S$GLB,, | Performed by: STUDENT IN AN ORGANIZED HEALTH CARE EDUCATION/TRAINING PROGRAM

## 2023-04-20 PROCEDURE — 17000 PR DESTRUCTION(LASER SURGERY,CRYOSURGERY,CHEMOSURGERY),PREMALIGNANT LESIONS,FIRST LESION: ICD-10-PCS | Mod: XS,S$GLB,, | Performed by: STUDENT IN AN ORGANIZED HEALTH CARE EDUCATION/TRAINING PROGRAM

## 2023-04-20 PROCEDURE — 3044F PR MOST RECENT HEMOGLOBIN A1C LEVEL <7.0%: ICD-10-PCS | Mod: CPTII,S$GLB,, | Performed by: STUDENT IN AN ORGANIZED HEALTH CARE EDUCATION/TRAINING PROGRAM

## 2023-04-20 PROCEDURE — 1160F PR REVIEW ALL MEDS BY PRESCRIBER/CLIN PHARMACIST DOCUMENTED: ICD-10-PCS | Mod: CPTII,S$GLB,, | Performed by: STUDENT IN AN ORGANIZED HEALTH CARE EDUCATION/TRAINING PROGRAM

## 2023-04-20 PROCEDURE — 17110 PR DESTRUCTION BENIGN LESIONS UP TO 14: ICD-10-PCS | Mod: S$GLB,,, | Performed by: STUDENT IN AN ORGANIZED HEALTH CARE EDUCATION/TRAINING PROGRAM

## 2023-04-20 PROCEDURE — 17000 DESTRUCT PREMALG LESION: CPT | Mod: XS,S$GLB,, | Performed by: STUDENT IN AN ORGANIZED HEALTH CARE EDUCATION/TRAINING PROGRAM

## 2023-04-20 PROCEDURE — 99203 PR OFFICE/OUTPT VISIT, NEW, LEVL III, 30-44 MIN: ICD-10-PCS | Mod: 25,S$GLB,, | Performed by: STUDENT IN AN ORGANIZED HEALTH CARE EDUCATION/TRAINING PROGRAM

## 2023-04-20 PROCEDURE — 99999 PR PBB SHADOW E&M-EST. PATIENT-LVL III: CPT | Mod: PBBFAC,,, | Performed by: STUDENT IN AN ORGANIZED HEALTH CARE EDUCATION/TRAINING PROGRAM

## 2023-04-20 PROCEDURE — 1159F MED LIST DOCD IN RCRD: CPT | Mod: CPTII,S$GLB,, | Performed by: STUDENT IN AN ORGANIZED HEALTH CARE EDUCATION/TRAINING PROGRAM

## 2023-04-20 PROCEDURE — 1160F RVW MEDS BY RX/DR IN RCRD: CPT | Mod: CPTII,S$GLB,, | Performed by: STUDENT IN AN ORGANIZED HEALTH CARE EDUCATION/TRAINING PROGRAM

## 2023-04-20 PROCEDURE — 99203 OFFICE O/P NEW LOW 30 MIN: CPT | Mod: 25,S$GLB,, | Performed by: STUDENT IN AN ORGANIZED HEALTH CARE EDUCATION/TRAINING PROGRAM

## 2023-04-20 NOTE — PROGRESS NOTES
Subjective:       Patient ID:  Luiz Ybarra is a 43 y.o. male who presents for   Chief Complaint   Patient presents with    Skin Tags     History of Present Illness: The patient presents with chief complaint of a painful skin tag.  Location: under the left eye  Duration: several months  Signs/Symptoms: growing, hanging, irritated tag  Prior treatments: none  Also has a red, scaly growth on the top of the left ear; no treatments. Denies any other rapidly growing, bleeding or non healing skin lesions.       Skin Tags      Review of Systems   Constitutional:  Negative for fever and chills.   Skin:  Negative for itching, rash and dry skin.      Objective:    Physical Exam   Constitutional: He appears well-developed and well-nourished. No distress.   Neurological: He is alert and oriented to person, place, and time. He is not disoriented.   Psychiatric: He has a normal mood and affect.   Skin:   Areas Examined (abnormalities noted in diagram):   Head / Face Inspection Performed  Neck Inspection Performed  Chest / Axilla Inspection Performed  Abdomen Inspection Performed  Back Inspection Performed  RUE Inspected  LUE Inspection Performed                 Diagram Legend     Erythematous scaling macule/papule c/w actinic keratosis       Vascular papule c/w angioma      Pigmented verrucoid papule/plaque c/w seborrheic keratosis      Yellow umbilicated papule c/w sebaceous hyperplasia      Irregularly shaped tan macule c/w lentigo     1-2 mm smooth white papules consistent with Milia      Movable subcutaneous cyst with punctum c/w epidermal inclusion cyst      Subcutaneous movable cyst c/w pilar cyst      Firm pink to brown papule c/w dermatofibroma      Pedunculated fleshy papule(s) c/w skin tag(s)      Evenly pigmented macule c/w junctional nevus     Mildly variegated pigmented, slightly irregular-bordered macule c/w mildly atypical nevus      Flesh colored to evenly pigmented papule c/w intradermal nevus        Pink pearly papule/plaque c/w basal cell carcinoma      Erythematous hyperkeratotic cursted plaque c/w SCC      Surgical scar with no sign of skin cancer recurrence      Open and closed comedones      Inflammatory papules and pustules      Verrucoid papule consistent consistent with wart     Erythematous eczematous patches and plaques     Dystrophic onycholytic nail with subungual debris c/w onychomycosis     Umbilicated papule    Erythematous-base heme-crusted tan verrucoid plaque consistent with inflamed seborrheic keratosis     Erythematous Silvery Scaling Plaque c/w Psoriasis     See annotation      Assessment / Plan:        Inflamed skin tag  Cryosurgery procedure note:    Verbal consent from the patient is obtained including, but not limited to, risk of hypopigmentation/hyperpigmentation, scar, recurrence of lesion. Liquid nitrogen cryosurgery is applied to 1 lesions to produce a freeze injury. The patient is aware that blisters may form and is instructed on wound care with gentle cleansing and use of vaseline ointment to keep moist until healed. The patient is supplied a handout on cryosurgery and is instructed to call if lesions do not completely resolve.    Actinic keratosis  Cryosurgery Procedure Note    Verbal consent from the patient is obtained including, but not limited to, risk of hypopigmentation/hyperpigmentation, scar, recurrence of lesion. The patient is aware of the precancerous quality and need for treatment of these lesions. Liquid nitrogen cryosurgery is applied to the 1 actinic keratoses, as detailed in the physical exam, to produce a freeze injury. The patient is aware that blisters may form and is instructed on wound care with gentle cleansing and use of vaseline ointment to keep moist until healed. The patient is supplied a handout on cryosurgery and is instructed to call if lesions do not completely resolve.    Screening, malignant neoplasm, skin  upper body skin examination performed  today including at least 6 points as noted in physical examination. No lesions suspicious for malignancy noted.  Reassurance provided.    Instructed patient to observe lesion(s) for changes and follow up in clinic if changes are noted. Patient to monitor skin at home for new or changing lesions and follow up in clinic if noted.          Follow up in about 1 year (around 4/20/2024).

## 2023-05-02 ENCOUNTER — HOSPITAL ENCOUNTER (EMERGENCY)
Facility: HOSPITAL | Age: 43
Discharge: HOME | End: 2023-05-02
Attending: EMERGENCY MEDICINE
Payer: COMMERCIAL

## 2023-05-02 VITALS
HEART RATE: 64 BPM | WEIGHT: 186.5 LBS | RESPIRATION RATE: 18 BRPM | TEMPERATURE: 97 F | DIASTOLIC BLOOD PRESSURE: 71 MMHG | HEIGHT: 70 IN | BODY MASS INDEX: 26.7 KG/M2 | SYSTOLIC BLOOD PRESSURE: 139 MMHG | OXYGEN SATURATION: 99 %

## 2023-05-02 DIAGNOSIS — K59.00 CONSTIPATION: Primary | ICD-10-CM

## 2023-05-02 PROCEDURE — 99283 EMERGENCY DEPT VISIT LOW MDM: CPT

## 2023-05-02 RX ORDER — LACTULOSE 10 G/15ML
20 SOLUTION ORAL; RECTAL DAILY
Qty: 150 ML | Refills: 0 | Status: SHIPPED | OUTPATIENT
Start: 2023-05-02 | End: 2023-05-09

## 2023-05-02 NOTE — ED PROVIDER NOTES
Encounter Date: 5/2/2023       History     Chief Complaint   Patient presents with    Abdominal Pain     Pt here with c/o generalized abd pain for a few days. States he thinks he is constipated. Has not had a BM in a few days. Takes stool softeners daily. Still passing gas     Patient presents to ER for lower abdominal pain, onset prior to arrival today.  Associated symptoms include constipation.  States last bowel movement approximately 3 days ago.  He states he normally has a bowel movement daily.  Symptoms have been constant since onset.  He denies nausea, vomiting, fever, flank pain, dysuria, weakness, fatigue.    The history is provided by the patient.   Review of patient's allergies indicates:  No Known Allergies  Past Medical History:   Diagnosis Date    Gout     Hypoglycemia     Nephrolithiasis      Past Surgical History:   Procedure Laterality Date    COLONOSCOPY N/A 11/6/2017    Procedure: COLONOSCOPY;  Surgeon: Jovani Colón III, MD;  Location: [facility];  Service: Endoscopy;  Laterality: N/A;    COLONOSCOPY N/A 12/1/2020    Procedure: COLONOSCOPY;  Surgeon: Sima Reilly MD;  Location: [facility];  Service: Endoscopy;  Laterality: N/A;    KNEE SURGERY       Family History   Problem Relation Age of Onset    Diabetes Father     Diabetes Sister     Colon polyps Sister     Diabetes Maternal Grandmother     Colon cancer Maternal Grandmother     Colon polyps Maternal Grandmother     Glaucoma Maternal Grandmother     Diabetes Paternal Grandmother     Glaucoma Paternal Grandmother     Cancer Paternal Grandfather     Colon cancer Maternal Uncle     Colon polyps Mother     Heart disease Neg Hx      Social History     Tobacco Use    Smoking status: Never    Smokeless tobacco: Never   Substance Use Topics    Alcohol use: No    Drug use: No     Review of Systems   Constitutional:  Negative for chills, fatigue and fever.   HENT:  Negative for ear pain.    Eyes:  Negative for pain.   Respiratory:  Negative for  cough and shortness of breath.    Cardiovascular:  Negative for chest pain and palpitations.   Gastrointestinal:  Positive for abdominal pain and constipation. Negative for diarrhea, nausea and vomiting.   Genitourinary:  Negative for dysuria.   Musculoskeletal:  Negative for back pain, myalgias and neck pain.   Skin:  Negative for rash.   Neurological:  Negative for weakness and headaches.     Physical Exam     Initial Vitals [05/02/23 1715]   BP Pulse Resp Temp SpO2   121/76 67 16 97.9 °F (36.6 °C) 100 %      MAP       --         Physical Exam    Nursing note and vitals reviewed.  Constitutional: He appears well-developed and well-nourished. He is not diaphoretic. He is cooperative.  Non-toxic appearance. No distress.   HENT:   Head: Normocephalic and atraumatic.   Eyes: EOM are normal.   Neck: Neck supple.   Normal range of motion.  Cardiovascular:  Normal rate, regular rhythm and intact distal pulses.           Pulmonary/Chest: No respiratory distress.   Abdominal: Abdomen is soft. Bowel sounds are normal. He exhibits no distension. There is abdominal tenderness (+left lower abdominal tenderness upon palpation.).   Genitourinary: Rectum:      No abnormal anal tone.      Genitourinary Comments: Digital rectal exam performed, + moderate amount of hard stool palpated.  Unable to retrieve during digital exam.     Musculoskeletal:         General: Normal range of motion.      Cervical back: Normal range of motion and neck supple.     Neurological: He is alert and oriented to person, place, and time. He has normal strength. GCS score is 15. GCS eye subscore is 4. GCS verbal subscore is 5. GCS motor subscore is 6.   Skin: Skin is warm and dry. Capillary refill takes less than 2 seconds.       ED Course   Procedures  Labs Reviewed - No data to display       Imaging Results              X-Ray Abdomen Flat And Erect (Final result)  Result time 05/02/23 17:45:36      Final result by Ollie Conde MD (05/02/23 17:45:36)                    Impression:      Constipation versus impaction.      Electronically signed by: Ollie Conde  Date:    05/02/2023  Time:    17:45               Narrative:    EXAMINATION:  XR ABDOMEN FLAT AND ERECT    CLINICAL HISTORY:  Constipation, unspecified    TECHNIQUE:  Flat and erect AP views of the abdomen were performed.    COMPARISON:  None    FINDINGS:  No air-fluid levels on upright radiograph.  No dilated loops of small bowel on supine radiograph.    Constipation versus impaction.    No calculi overlie the renal shadows.    Osseous structures are grossly intact.  Lung bases are clear.                                       Medications   sodium phosphates 19-7 gram/118 mL enema 1 enema (1 enema Rectal Not Given 5/2/23 1830)                  Upon reassessment of patient, He now respectfully declines Fleets enema, states he wishes to take p.o. medication once he gets home.  Discussed x-ray results with patient.  Lactulose Rx provided.  Discussed signs and symptoms to return to ER.  Discussed outpatient follow-up with his PCP.  Patient agrees with plan and voices no further concerns.  Regarding CONSTIPATION, I informed patient of common causes of constipation (low-fiber diet, lack of physical activity, decreased fluid intake, and delays in going to the bathroom when urge is present) and ways to prevent it (eat lots of fiber, drink plenty of fluids daily, exercise regularly, and go to the bathroom when you urge presents. Reiterated the importance of following up with primary care provider for management of their constipation.     I discussed with patient  that evaluation in the ED does not suggest any emergent or life threatening medical conditions requiring immediate intervention beyond what was provided in the ED, and I believe patient is safe for discharge. Regardless, an unremarkable evaluation in the ED does not preclude the development or presence of a serious of life threatening condition. As such,  patient was instructed to return immediately for any worsening or change in current symptoms.           Clinical Impression:   Final diagnoses:  [K59.00] Constipation (Primary)        ED Disposition Condition    Discharge Stable          ED Prescriptions       Medication Sig Dispense Start Date End Date Auth. Provider    lactulose (CHRONULAC) 20 gram/30 mL Soln Take 30 mLs (20 g total) by mouth once daily. for 5 days 150 mL 5/2/2023 5/7/2023 Eladio Bautista NP          Follow-up Information       Follow up With Specialties Details Why Contact Info    Rodrigo Hernandez MD Family Medicine In 2 days  [address] [facility] [address] [phone]      [facility]. Emergency Medicine  As needed, If symptoms worsen [address] [facility] [address] [phone]             Eladio Bautista NP  05/02/23 3917

## 2023-05-02 NOTE — Clinical Note
"Luiz Lyman" Amada was seen and treated in our emergency department on 5/2/2023.  He may return to work on 05/04/2023.       If you have any questions or concerns, please don't hesitate to call.      Eladio Bautista NP"

## 2023-05-03 NOTE — ED NOTES
Pt refused enema. Stated he would rather have provider give prescription for something for him to take at home. Provider stated he will discharge with prescription.

## 2023-05-18 ENCOUNTER — PATIENT MESSAGE (OUTPATIENT)
Dept: INTERNAL MEDICINE | Facility: CLINIC | Age: 43
End: 2023-05-18
Payer: COMMERCIAL

## 2023-11-22 NOTE — TELEPHONE ENCOUNTER
Care Due:                  Date            Visit Type   Department     Provider  --------------------------------------------------------------------------------                                MYCHART                              ANNUAL                              CHECKUP/PHY  PRV INTERNAL  Last Visit: 02-      S            MEDICINE       Rodrigo Suazo  Next Visit: None Scheduled  None         None Found                                                            Last  Test          Frequency    Reason                     Performed    Due Date  --------------------------------------------------------------------------------    Office Visit  12 months..  linaCLOtide..............  02- 02-    [facility] Embedded Care Due Messages. Reference number: [id number].   11/22/2023 12:43:28 PM CST

## 2023-12-13 ENCOUNTER — PATIENT MESSAGE (OUTPATIENT)
Dept: PRIMARY CARE CLINIC | Facility: CLINIC | Age: 43
End: 2023-12-13
Payer: COMMERCIAL

## 2023-12-13 DIAGNOSIS — Z00.00 ROUTINE GENERAL MEDICAL EXAMINATION AT A HEALTH CARE FACILITY: Primary | ICD-10-CM

## 2023-12-13 DIAGNOSIS — Z87.19 HISTORY OF DIVERTICULITIS OF COLON: ICD-10-CM

## 2023-12-13 DIAGNOSIS — E66.09 CLASS 1 OBESITY DUE TO EXCESS CALORIES WITH SERIOUS COMORBIDITY AND BODY MASS INDEX (BMI) OF 32.0 TO 32.9 IN ADULT: ICD-10-CM

## 2023-12-13 DIAGNOSIS — G47.33 OSA ON CPAP: ICD-10-CM

## 2023-12-13 DIAGNOSIS — E16.2 HYPOGLYCEMIA: ICD-10-CM

## 2023-12-13 NOTE — TELEPHONE ENCOUNTER
Pt is wanting to establish care with Dr. Felipe since Dr. GONZALES will be leaving [facility]. Dr. Felipe is ok with accepting this patient and has spoken with his wife, Dr. Clara Ybarra.     He will be due for annual in 2/9/24. Can place lab orders now for upcoming visit to do 1 week prior to appt. Please reach out and assist patient with making annual appt on or after 2/9/24.     I have signed for the following orders AND/OR meds.  Please call the patient and ask the patient to schedule the testing AND/OR inform about any medications that were sent.      Orders Placed This Encounter   Procedures    TSH     Standing Status:   Future     Standing Expiration Date:   2/10/2025    Hemoglobin A1C     Standing Status:   Future     Standing Expiration Date:   2/10/2025    Lipid Panel     Standing Status:   Future     Standing Expiration Date:   2/10/2025    Comprehensive Metabolic Panel     Standing Status:   Future     Standing Expiration Date:   2/10/2025    CBC Auto Differential     Standing Status:   Future     Standing Expiration Date:   2/10/2025    PSA, Screening     Standing Status:   Future     Standing Expiration Date:   2/10/2025    T4, Free     Standing Status:   Future     Standing Expiration Date:   2/10/2025    Microalbumin/Creatinine Ratio, Urine     Standing Status:   Future     Standing Expiration Date:   6/13/2025     Order Specific Question:   Specimen Source     Answer:   Urine    Insulin, Random     Standing Status:   Future     Standing Expiration Date:   2/10/2025    CRP, High Sensitivity     Standing Status:   Future     Standing Expiration Date:   2/10/2025

## 2024-01-09 ENCOUNTER — OFFICE VISIT (OUTPATIENT)
Dept: URGENT CARE | Facility: CLINIC | Age: 44
End: 2024-01-09
Payer: COMMERCIAL

## 2024-01-09 VITALS
RESPIRATION RATE: 24 BRPM | WEIGHT: 186.5 LBS | TEMPERATURE: 103 F | HEIGHT: 70 IN | HEART RATE: 122 BPM | SYSTOLIC BLOOD PRESSURE: 121 MMHG | OXYGEN SATURATION: 100 % | BODY MASS INDEX: 26.7 KG/M2 | DIASTOLIC BLOOD PRESSURE: 75 MMHG

## 2024-01-09 DIAGNOSIS — J18.9 PNEUMONIA OF RIGHT LOWER LOBE DUE TO INFECTIOUS ORGANISM: Primary | ICD-10-CM

## 2024-01-09 DIAGNOSIS — Z20.828 EXPOSURE TO THE FLU: ICD-10-CM

## 2024-01-09 DIAGNOSIS — R05.9 COUGH, UNSPECIFIED TYPE: ICD-10-CM

## 2024-01-09 DIAGNOSIS — R06.02 SOB (SHORTNESS OF BREATH): ICD-10-CM

## 2024-01-09 DIAGNOSIS — R50.9 FEVER, UNSPECIFIED FEVER CAUSE: ICD-10-CM

## 2024-01-09 LAB
CTP QC/QA: YES
MOLECULAR STREP A: NEGATIVE
POC MOLECULAR INFLUENZA A AGN: NEGATIVE
POC MOLECULAR INFLUENZA B AGN: NEGATIVE
SARS-COV-2 AG RESP QL IA.RAPID: NEGATIVE

## 2024-01-09 PROCEDURE — 87811 SARS-COV-2 COVID19 W/OPTIC: CPT | Mod: QW,S$GLB,,

## 2024-01-09 PROCEDURE — 94640 AIRWAY INHALATION TREATMENT: CPT | Mod: S$GLB,,,

## 2024-01-09 PROCEDURE — 99214 OFFICE O/P EST MOD 30 MIN: CPT | Mod: 25,S$GLB,,

## 2024-01-09 PROCEDURE — 87502 INFLUENZA DNA AMP PROBE: CPT | Mod: QW,S$GLB,,

## 2024-01-09 PROCEDURE — 87651 STREP A DNA AMP PROBE: CPT | Mod: QW,S$GLB,,

## 2024-01-09 PROCEDURE — 71046 X-RAY EXAM CHEST 2 VIEWS: CPT | Mod: S$GLB,,, | Performed by: RADIOLOGY

## 2024-01-09 RX ORDER — ACETAMINOPHEN 500 MG
1000 TABLET ORAL
Status: COMPLETED | OUTPATIENT
Start: 2024-01-09 | End: 2024-01-09

## 2024-01-09 RX ORDER — ALBUTEROL SULFATE 90 UG/1
2 AEROSOL, METERED RESPIRATORY (INHALATION) EVERY 6 HOURS PRN
Qty: 18 G | Refills: 0 | Status: SHIPPED | OUTPATIENT
Start: 2024-01-09 | End: 2025-01-08

## 2024-01-09 RX ORDER — IPRATROPIUM BROMIDE 0.5 MG/2.5ML
0.5 SOLUTION RESPIRATORY (INHALATION)
Status: COMPLETED | OUTPATIENT
Start: 2024-01-09 | End: 2024-01-09

## 2024-01-09 RX ORDER — PROMETHAZINE HYDROCHLORIDE AND CODEINE PHOSPHATE 6.25; 1 MG/5ML; MG/5ML
5 SOLUTION ORAL EVERY 4 HOURS PRN
Qty: 118 ML | Refills: 0 | Status: SHIPPED | OUTPATIENT
Start: 2024-01-09 | End: 2024-01-19

## 2024-01-09 RX ORDER — PROMETHAZINE HYDROCHLORIDE AND CODEINE PHOSPHATE 6.25; 1 MG/5ML; MG/5ML
5 SOLUTION ORAL EVERY 4 HOURS PRN
Qty: 118 ML | Refills: 0 | Status: SHIPPED | OUTPATIENT
Start: 2024-01-09 | End: 2024-01-09

## 2024-01-09 RX ORDER — AMOXICILLIN 500 MG/1
1000 TABLET, FILM COATED ORAL EVERY 8 HOURS
Qty: 30 TABLET | Refills: 0 | Status: SHIPPED | OUTPATIENT
Start: 2024-01-09 | End: 2024-01-14

## 2024-01-09 RX ORDER — PROMETHAZINE HYDROCHLORIDE AND CODEINE PHOSPHATE 6.25; 1 MG/5ML; MG/5ML
5 SOLUTION ORAL EVERY 4 HOURS PRN
Qty: 118 ML | Refills: 0 | Status: SHIPPED | OUTPATIENT
Start: 2024-01-09 | End: 2024-01-09 | Stop reason: RX

## 2024-01-09 RX ORDER — ALBUTEROL SULFATE 0.83 MG/ML
2.5 SOLUTION RESPIRATORY (INHALATION)
Status: COMPLETED | OUTPATIENT
Start: 2024-01-09 | End: 2024-01-09

## 2024-01-09 RX ADMIN — IPRATROPIUM BROMIDE 0.5 MG: 0.5 SOLUTION RESPIRATORY (INHALATION) at 04:01

## 2024-01-09 RX ADMIN — ALBUTEROL SULFATE 2.5 MG: 0.83 SOLUTION RESPIRATORY (INHALATION) at 04:01

## 2024-01-09 RX ADMIN — Medication 1000 MG: at 03:01

## 2024-01-09 NOTE — LETTER
January 9, 2024      [facility] [address] [place] [address]  Phone: [phone]  Fax: [phone]       Patient: Luiz Ybarra   YOB: 1980  Date of Visit: 01/09/2024    To Whom It May Concern:    Bhakti Ybarra  was at [facility] on 01/09/2024. The patient may return to work/school on 01/12/2024 with no restrictions. If you have any questions or concerns, or if I can be of further assistance, please do not hesitate to contact me.    Sincerely,    Leilani Whittaker PA-C

## 2024-01-09 NOTE — PROGRESS NOTES
"Subjective:      Patient ID: Luiz Ybarra is a 44 y.o. male.    Vitals:  height is 5' 10" (1.778 m) and weight is 84.6 kg (186 lb 8.2 oz). His tympanic temperature is 103.2 °F (39.6 °C) (abnormal). His blood pressure is 121/75 and his pulse is 122 (abnormal). His respiration is 24 (abnormal) and oxygen saturation is 100%.     Chief Complaint: Cough (Just got over flu cant stop coughing still have the chills no energy or appetite - Entered by patient)    Luiz Ybarra is a 44 y.o. male who presents for productive cough which onset 2 weeks ago. Symptoms improved and worsened again yesterday. Associated sxs include fever (Tmax 102 F), chills, HA, chest tightness, congestion, and sore throat. Patient denies any SOB, CP, abd pain, n/v/d, rash, dizziness, or numbness/tingling. Prior Tx includes OTC cough meds and prescription cough meds with no relief. Last dose of fever reducing was at 8:30 am today. (+) flu exposure.     Cough  This is a new problem. The current episode started 1 to 4 weeks ago. The problem has been gradually worsening. The cough is Productive of purulent sputum. Associated symptoms include chills, a fever, headaches, nasal congestion, postnasal drip and a sore throat. Pertinent negatives include no chest pain, ear pain, myalgias or shortness of breath. The symptoms are aggravated by exercise and pollens. Risk factors for lung disease include occupational exposure. He has tried OTC cough suppressant, prescription cough suppressant and rest for the symptoms. The treatment provided no relief.       Constitution: Positive for chills and fever. Negative for appetite change, sweating and fatigue.   HENT:  Positive for postnasal drip and sore throat. Negative for ear pain, ear discharge, foreign body in ear, hearing loss, congestion, sinus pain, sinus pressure and trouble swallowing.    Cardiovascular:  Negative for chest pain.   Respiratory:  Positive for cough and sputum production. Negative " for shortness of breath.    Gastrointestinal:  Negative for abdominal pain, nausea, vomiting and diarrhea.   Musculoskeletal:  Negative for muscle ache.   Neurological:  Positive for headaches. Negative for dizziness, numbness and tingling.      Objective:     Physical Exam   Constitutional: He is oriented to person, place, and time. He appears well-developed. He is cooperative.  Non-toxic appearance. He does not appear ill. No distress.   HENT:   Head: Normocephalic and atraumatic.   Ears:   Right Ear: Hearing, tympanic membrane, external ear and ear canal normal.   Left Ear: Hearing, tympanic membrane, external ear and ear canal normal.   Nose: Nose normal. No mucosal edema or nasal deformity. No epistaxis. Right sinus exhibits no maxillary sinus tenderness and no frontal sinus tenderness. Left sinus exhibits no maxillary sinus tenderness and no frontal sinus tenderness.   Mouth/Throat: Uvula is midline, oropharynx is clear and moist and mucous membranes are normal. Mucous membranes are moist. No trismus in the jaw. Normal dentition. No uvula swelling. No oropharyngeal exudate, posterior oropharyngeal edema or posterior oropharyngeal erythema.   Eyes: Conjunctivae and lids are normal. No scleral icterus. Extraocular movement intact   Neck: Trachea normal and phonation normal. Neck supple. No edema present. No erythema present. No neck rigidity present.   Cardiovascular: Normal rate, regular rhythm, normal heart sounds and normal pulses.   Pulmonary/Chest: Effort normal. No stridor. No respiratory distress. He has no decreased breath sounds. He has no wheezes. He has no rhonchi. He has rales.         Comments: Crackles in R lung fields. No expiratory wheezes. Slightly tachypneic. No respiratory distress.     Abdominal: Normal appearance.   Musculoskeletal: Normal range of motion.         General: No deformity. Normal range of motion.   Neurological: He is alert and oriented to person, place, and time. He exhibits  normal muscle tone. Coordination normal.   Skin: Skin is warm, dry, intact, not diaphoretic and not pale.   Psychiatric: His speech is normal and behavior is normal. Judgment and thought content normal.   Nursing note and vitals reviewed.      Assessment:     1. Pneumonia of right lower lobe due to infectious organism    2. Fever, unspecified fever cause    3. Cough, unspecified type    4. SOB (shortness of breath)    5. Exposure to the flu        Results for orders placed or performed in visit on 01/09/24   POCT Influenza A/B MOLECULAR   Result Value Ref Range    POC Molecular Influenza A Ag Negative Negative, Not Reported    POC Molecular Influenza B Ag Negative Negative, Not Reported     Acceptable Yes    POCT Strep A, Molecular   Result Value Ref Range    Molecular Strep A, POC Negative Negative     Acceptable Yes    SARS Coronavirus 2 Antigen, POCT Manual Read   Result Value Ref Range    SARS Coronavirus 2 Antigen Negative Negative     Acceptable Yes        XR CHEST PA AND LATERAL  Narrative: EXAMINATION:  XR CHEST PA AND LATERAL    CLINICAL HISTORY:  Shortness of breath    TECHNIQUE:  PA and lateral views of the chest were performed.    COMPARISON:  None    FINDINGS:  There is an opacity in the right lower lobe, compatible with pneumonia.  The lungs are otherwise clear.  The pleural spaces are clear.  The cardiac silhouette is unremarkable.  Osseous structures are intact.  Impression: Right lower lobe pneumonia.    Electronically signed by: Clarence Paris  Date:    01/09/2024  Time:    17:18      Plan:       Pneumonia of right lower lobe due to infectious organism  -     amoxicillin (AMOXIL) 500 MG Tab; Take 2 tablets (1,000 mg total) by mouth every 8 (eight) hours. for 5 days  Dispense: 30 tablet; Refill: 0    Fever, unspecified fever cause  -     POCT Influenza A/B MOLECULAR  -     POCT Strep A, Molecular  -     SARS Coronavirus 2 Antigen, POCT Manual Read  -      acetaminophen tablet 1,000 mg    Cough, unspecified type  -     promethazine-codeine 6.25-10 mg/5 ml (PHENERGAN WITH CODEINE) 6.25-10 mg/5 mL syrup; Take 5 mLs by mouth every 4 (four) hours as needed for Cough.  Dispense: 118 mL; Refill: 0    SOB (shortness of breath)  -     XR CHEST PA AND LATERAL; Future; Expected date: 01/09/2024  -     albuterol nebulizer solution 2.5 mg  -     ipratropium 0.02 % nebulizer solution 0.5 mg  -     albuterol (VENTOLIN HFA) 90 mcg/actuation inhaler; Inhale 2 puffs into the lungs every 6 (six) hours as needed for Wheezing. Rescue  Dispense: 18 g; Refill: 0    Exposure to the flu      Chart reviewed.   Febrile. Tachycardic. Slightly tachypneic. Patient in no respiratory distress.  Tylenol 1000 mg given in clinic for fever.   Negative flu, strep, and COVID-19. Discussed negative results with patient.  XR not available until 5 pm. Offered patient to go to sister clinic at [place] or [facility] for imaging. Patient states he would like to just wait in clinic for XR until 5 pm.  DuoNeb given in clinic.  XR ordered. RLL pneumonia. With negative flu and COVID-19, no current evidence of viral PNA. Will treat for community acquired pneumonia. May also be flu pneumonia.  Patient to have repeat flu testing in a few days.   Meds; Amoxicillin, albuterol, and promethazine-codeine sent to preferred pharmacy.  Patient may follow up with PCP in a few days for improvement of symptoms.   Strict ER precautions given for worsening SOB, CP, hemoptysis, or persistent fever.  RTC as needed.        Additional MDM:     Well's Criteria Score:  -Clinical symptoms of DVT (leg swelling, pain with palpation) = 0.0  -PE is #1 diagnosis OR equally likely =            0.0  -Heart Rate >100 =   1.5  -Immobilization (= or > than 3 days) or surgery in the previous 4 weeks = 0.0  -Previous DVT/PE = 0.0  -Hemoptysis =          0.0  -Malignancy =           0.0  Well's Probability Score =    1.5

## 2024-01-10 ENCOUNTER — TELEPHONE (OUTPATIENT)
Dept: URGENT CARE | Facility: CLINIC | Age: 44
End: 2024-01-10
Payer: COMMERCIAL

## 2024-01-10 NOTE — TELEPHONE ENCOUNTER
Pt called clinic to confirm that promethazine was sent to the [place] pharmacy, confirmed that it was sent.

## 2024-02-22 ENCOUNTER — LAB VISIT (OUTPATIENT)
Dept: LAB | Facility: HOSPITAL | Age: 44
End: 2024-02-22
Attending: FAMILY MEDICINE
Payer: COMMERCIAL

## 2024-02-22 DIAGNOSIS — Z87.19 HISTORY OF DIVERTICULITIS OF COLON: ICD-10-CM

## 2024-02-22 DIAGNOSIS — Z00.00 ROUTINE GENERAL MEDICAL EXAMINATION AT A HEALTH CARE FACILITY: ICD-10-CM

## 2024-02-22 DIAGNOSIS — E66.09 CLASS 1 OBESITY DUE TO EXCESS CALORIES WITH SERIOUS COMORBIDITY AND BODY MASS INDEX (BMI) OF 32.0 TO 32.9 IN ADULT: ICD-10-CM

## 2024-02-22 DIAGNOSIS — G47.33 OSA ON CPAP: ICD-10-CM

## 2024-02-22 DIAGNOSIS — E16.2 HYPOGLYCEMIA: ICD-10-CM

## 2024-02-22 LAB
ALBUMIN SERPL BCP-MCNC: 4.2 G/DL (ref 3.5–5.2)
ALP SERPL-CCNC: 61 U/L (ref 55–135)
ALT SERPL W/O P-5'-P-CCNC: 32 U/L (ref 10–44)
ANION GAP SERPL CALC-SCNC: 11 MMOL/L (ref 8–16)
AST SERPL-CCNC: 35 U/L (ref 10–40)
BILIRUB SERPL-MCNC: 1 MG/DL (ref 0.1–1)
BUN SERPL-MCNC: 20 MG/DL (ref 6–20)
CALCIUM SERPL-MCNC: 9.7 MG/DL (ref 8.7–10.5)
CHLORIDE SERPL-SCNC: 106 MMOL/L (ref 95–110)
CHOLEST SERPL-MCNC: 181 MG/DL (ref 120–199)
CHOLEST/HDLC SERPL: 2.5 {RATIO} (ref 2–5)
CO2 SERPL-SCNC: 25 MMOL/L (ref 23–29)
CREAT SERPL-MCNC: 0.9 MG/DL (ref 0.5–1.4)
CRP SERPL-MCNC: 0.35 MG/L (ref 0–3.19)
EST. GFR  (NO RACE VARIABLE): >60 ML/MIN/1.73 M^2
ESTIMATED AVG GLUCOSE: 80 MG/DL (ref 68–131)
GLUCOSE SERPL-MCNC: 82 MG/DL (ref 70–110)
HBA1C MFR BLD: 4.4 % (ref 4–5.6)
HDLC SERPL-MCNC: 71 MG/DL (ref 40–75)
HDLC SERPL: 39.2 % (ref 20–50)
INSULIN COLLECTION INTERVAL: NORMAL
INSULIN SERPL-ACNC: 3 UU/ML
LDLC SERPL CALC-MCNC: 102 MG/DL (ref 63–159)
NONHDLC SERPL-MCNC: 110 MG/DL
POTASSIUM SERPL-SCNC: 4.9 MMOL/L (ref 3.5–5.1)
PROT SERPL-MCNC: 7 G/DL (ref 6–8.4)
SODIUM SERPL-SCNC: 142 MMOL/L (ref 136–145)
TRIGL SERPL-MCNC: 40 MG/DL (ref 30–150)

## 2024-02-22 PROCEDURE — 86141 C-REACTIVE PROTEIN HS: CPT | Performed by: FAMILY MEDICINE

## 2024-02-22 PROCEDURE — 85025 COMPLETE CBC W/AUTO DIFF WBC: CPT | Performed by: FAMILY MEDICINE

## 2024-02-22 PROCEDURE — 84153 ASSAY OF PSA TOTAL: CPT | Performed by: FAMILY MEDICINE

## 2024-02-22 PROCEDURE — 80061 LIPID PANEL: CPT | Performed by: FAMILY MEDICINE

## 2024-02-22 PROCEDURE — 83036 HEMOGLOBIN GLYCOSYLATED A1C: CPT | Performed by: FAMILY MEDICINE

## 2024-02-22 PROCEDURE — 80053 COMPREHEN METABOLIC PANEL: CPT | Performed by: FAMILY MEDICINE

## 2024-02-22 PROCEDURE — 83525 ASSAY OF INSULIN: CPT | Performed by: FAMILY MEDICINE

## 2024-02-22 PROCEDURE — 84439 ASSAY OF FREE THYROXINE: CPT | Performed by: FAMILY MEDICINE

## 2024-02-22 PROCEDURE — 82043 UR ALBUMIN QUANTITATIVE: CPT | Performed by: FAMILY MEDICINE

## 2024-02-22 PROCEDURE — 84443 ASSAY THYROID STIM HORMONE: CPT | Performed by: FAMILY MEDICINE

## 2024-02-23 LAB
ALBUMIN/CREAT UR: 5.7 UG/MG (ref 0–30)
BASOPHILS # BLD AUTO: 0.05 K/UL (ref 0–0.2)
BASOPHILS NFR BLD: 1 % (ref 0–1.9)
COMPLEXED PSA SERPL-MCNC: 0.3 NG/ML (ref 0–4)
CREAT UR-MCNC: 244 MG/DL (ref 23–375)
DIFFERENTIAL METHOD BLD: ABNORMAL
EOSINOPHIL # BLD AUTO: 0.1 K/UL (ref 0–0.5)
EOSINOPHIL NFR BLD: 2.2 % (ref 0–8)
ERYTHROCYTE [DISTWIDTH] IN BLOOD BY AUTOMATED COUNT: 14.7 % (ref 11.5–14.5)
HCT VFR BLD AUTO: 45.6 % (ref 40–54)
HGB BLD-MCNC: 16 G/DL (ref 14–18)
IMM GRANULOCYTES # BLD AUTO: 0.01 K/UL (ref 0–0.04)
IMM GRANULOCYTES NFR BLD AUTO: 0.2 % (ref 0–0.5)
LYMPHOCYTES # BLD AUTO: 1.1 K/UL (ref 1–4.8)
LYMPHOCYTES NFR BLD: 22.4 % (ref 18–48)
MCH RBC QN AUTO: 32 PG (ref 27–31)
MCHC RBC AUTO-ENTMCNC: 35.1 G/DL (ref 32–36)
MCV RBC AUTO: 91 FL (ref 82–98)
MICROALBUMIN UR DL<=1MG/L-MCNC: 14 UG/ML
MONOCYTES # BLD AUTO: 0.4 K/UL (ref 0.3–1)
MONOCYTES NFR BLD: 8 % (ref 4–15)
NEUTROPHILS # BLD AUTO: 3.3 K/UL (ref 1.8–7.7)
NEUTROPHILS NFR BLD: 66.2 % (ref 38–73)
NRBC BLD-RTO: 0 /100 WBC
PLATELET # BLD AUTO: 231 K/UL (ref 150–450)
PMV BLD AUTO: 9.6 FL (ref 9.2–12.9)
RBC # BLD AUTO: 5 M/UL (ref 4.6–6.2)
T4 FREE SERPL-MCNC: 1 NG/DL (ref 0.71–1.51)
TSH SERPL DL<=0.005 MIU/L-ACNC: 0.75 UIU/ML (ref 0.4–4)
WBC # BLD AUTO: 4.99 K/UL (ref 3.9–12.7)

## 2024-02-27 ENCOUNTER — OFFICE VISIT (OUTPATIENT)
Dept: PRIMARY CARE CLINIC | Facility: CLINIC | Age: 44
End: 2024-02-27
Payer: COMMERCIAL

## 2024-02-27 VITALS
TEMPERATURE: 97 F | WEIGHT: 166.13 LBS | DIASTOLIC BLOOD PRESSURE: 70 MMHG | HEART RATE: 64 BPM | BODY MASS INDEX: 23.84 KG/M2 | SYSTOLIC BLOOD PRESSURE: 110 MMHG | OXYGEN SATURATION: 99 %

## 2024-02-27 DIAGNOSIS — Z00.00 ROUTINE GENERAL MEDICAL EXAMINATION AT A HEALTH CARE FACILITY: Primary | ICD-10-CM

## 2024-02-27 DIAGNOSIS — G47.33 OSA ON CPAP: ICD-10-CM

## 2024-02-27 DIAGNOSIS — Z12.5 PROSTATE CANCER SCREENING: ICD-10-CM

## 2024-02-27 DIAGNOSIS — L65.9 HAIR THINNING: ICD-10-CM

## 2024-02-27 DIAGNOSIS — K59.00 CONSTIPATION, UNSPECIFIED CONSTIPATION TYPE: ICD-10-CM

## 2024-02-27 PROCEDURE — 99396 PREV VISIT EST AGE 40-64: CPT | Mod: S$GLB,,, | Performed by: FAMILY MEDICINE

## 2024-02-27 PROCEDURE — 99999 PR PBB SHADOW E&M-EST. PATIENT-LVL III: CPT | Mod: PBBFAC,,, | Performed by: FAMILY MEDICINE

## 2024-02-27 PROCEDURE — 3008F BODY MASS INDEX DOCD: CPT | Mod: CPTII,S$GLB,, | Performed by: FAMILY MEDICINE

## 2024-02-27 PROCEDURE — 3044F HG A1C LEVEL LT 7.0%: CPT | Mod: CPTII,S$GLB,, | Performed by: FAMILY MEDICINE

## 2024-02-27 PROCEDURE — 3078F DIAST BP <80 MM HG: CPT | Mod: CPTII,S$GLB,, | Performed by: FAMILY MEDICINE

## 2024-02-27 PROCEDURE — 1160F RVW MEDS BY RX/DR IN RCRD: CPT | Mod: CPTII,S$GLB,, | Performed by: FAMILY MEDICINE

## 2024-02-27 PROCEDURE — 1159F MED LIST DOCD IN RCRD: CPT | Mod: CPTII,S$GLB,, | Performed by: FAMILY MEDICINE

## 2024-02-27 PROCEDURE — 3074F SYST BP LT 130 MM HG: CPT | Mod: CPTII,S$GLB,, | Performed by: FAMILY MEDICINE

## 2024-02-27 RX ORDER — ASCORBIC ACID 500 MG
500 TABLET ORAL DAILY
COMMUNITY

## 2024-02-27 RX ORDER — DOCUSATE SODIUM 100 MG/1
300 CAPSULE, LIQUID FILLED ORAL DAILY
COMMUNITY

## 2024-02-27 RX ORDER — BISACODYL 5 MG
10 TABLET, DELAYED RELEASE (ENTERIC COATED) ORAL ONCE
COMMUNITY

## 2024-02-27 RX ORDER — FINASTERIDE 1 MG/1
1 TABLET, FILM COATED ORAL DAILY
Qty: 90 TABLET | Refills: 3 | Status: SHIPPED | OUTPATIENT
Start: 2024-02-27

## 2024-02-27 RX ORDER — TIRZEPATIDE 10 MG/.5ML
10 INJECTION, SOLUTION SUBCUTANEOUS
COMMUNITY

## 2024-02-27 NOTE — PROGRESS NOTES
Subjective:      Patient ID: Luiz Ybarra is a 44 y.o. male.    Chief Complaint: Annual Exam (Refill on Propecia)      Patient is a 44 y.o. male coming in today for annual exam and to establish care. This is a new pt to me. He used to f/u with Dr. Hernandez, PCP.   Has been steadily losing weight with exercise, diet, and medication. Currently on Mounjaro through compounded clinic. Pt has lost about 40 lbs since he started Mounjaro. Pt runs 10 miles 3 times a week and follows a rigorous diet. Recent lab work results reviewed with pt. No signs of anemia or infection. Cholesterol and glucose levels are stable. Kidney functions and electrolytes are within normal limits. Reports small bump in clavicle area consistent with bony variant. Reports he had right lower lobe PNA diagnosed last month when he went to Urgent Care for SOB, cough, and fever after having the flu.  Sx have resolved since. No other health concern at this time.       Ohs Peq Sdoh    2/19/2024 10:10 AM CST - Filed by Patient   This questionnaire should take approximately 5 to 10 minutes to complete.  To begin, press Let's Begin and then press Continue. Let's Begin   On average, how many days per week do you engage in moderate to strenuous exercise (like a brisk walk)? 7 days   On average, how many minutes do you engage in exercise at this level? 120 min   Do you feel stress - tense, restless, nervous, or anxious, or unable to sleep at night because your mind is troubled all the time - these days? Only a little   Do you belong to any clubs or organizations such as [employer] groups, unions, fraternal or athletic groups, or school groups? No   How often do you attend meetings of the clubs or organizations you belong to? Never   In a typical week, how many times do you talk on the phone with family, friends, or neighbors? Twice a week   How often do you get together with friends or relatives? Once a week   Are you , , , ,  never , or living with a partner?    How hard is it for you to pay for the very basics like food, housing, medical care, and heating? Not hard at all   Within the past 12 months, you worried that your food would run out before you got the money to buy more. Never true   Within the past 12 months, the food you bought just didnt last and you didnt have money to get more. Never true   In the past 12 months, has lack of transportation kept you from medical appointments or from getting medications? No   In the past 12 months, has lack of transportation kept you from meetings, work, or from getting things needed for daily living? No   How often do you have a drink containing alcohol? Monthly or less   How many drinks containing alcohol do you have on a typical day when you are drinking? 1 or 2   How often do you have six or more drinks on one occasion? Never   In the last 12 months, was there a time when you were not able to pay the mortgage or rent on time? No   In the last 12 months, how many places have you lived? (range: at least 0) 1   In the last 12 months, was there a time when you did not have a steady place to sleep or slept in a shelter (including now)? No       Past Medical History:   Diagnosis Date    Gout     Hypoglycemia     Nephrolithiasis      Past Surgical History:   Procedure Laterality Date    COLONOSCOPY N/A 11/6/2017    Procedure: COLONOSCOPY;  Surgeon: Jovani Colón III, MD;  Location: [facility];  Service: Endoscopy;  Laterality: N/A;    COLONOSCOPY N/A 12/1/2020    Procedure: COLONOSCOPY;  Surgeon: Sima Reilly MD;  Location: [facility];  Service: Endoscopy;  Laterality: N/A;    KNEE SURGERY       Family History   Problem Relation Age of Onset    Diabetes Father     Diabetes Sister     Colon polyps Sister     Diabetes Maternal Grandmother     Colon cancer Maternal Grandmother     Colon polyps Maternal Grandmother     Glaucoma Maternal Grandmother     Diabetes Paternal  Grandmother     Glaucoma Paternal Grandmother     Cancer Paternal Grandfather     Colon cancer Maternal Uncle     Colon polyps Mother     Heart disease Neg Hx      Social History     Tobacco Use    Smoking status: Never     Passive exposure: Never    Smokeless tobacco: Never   Substance and Sexual Activity    Alcohol use: No    Drug use: No    Sexual activity: Yes     Partners: Female       LABS:   Lab Results   Component Value Date    HGBA1C 4.4 02/22/2024    HGBA1C 4.6 02/09/2023    HGBA1C 4.8 03/07/2022      Lab Results   Component Value Date    CHOL 181 02/22/2024    CHOL 214 (H) 02/09/2023    CHOL 213 (H) 03/07/2022     Lab Results   Component Value Date    LDLCALC 102.0 02/22/2024    LDLCALC 144.2 02/09/2023    LDLCALC 145.2 03/07/2022     Lab Results   Component Value Date    WBC 4.99 02/22/2024    HGB 16.0 02/22/2024    HCT 45.6 02/22/2024     02/22/2024    CHOL 181 02/22/2024    TRIG 40 02/22/2024    HDL 71 02/22/2024    ALT 32 02/22/2024    AST 35 02/22/2024     02/22/2024    K 4.9 02/22/2024     02/22/2024    CREATININE 0.9 02/22/2024    BUN 20 02/22/2024    CO2 25 02/22/2024    TSH 0.746 02/22/2024    PSA 0.30 02/22/2024    HGBA1C 4.4 02/22/2024       XR CHEST PA AND LATERAL  Narrative: EXAMINATION:  XR CHEST PA AND LATERAL    CLINICAL HISTORY:  Shortness of breath    TECHNIQUE:  PA and lateral views of the chest were performed.    COMPARISON:  None    FINDINGS:  There is an opacity in the right lower lobe, compatible with pneumonia.  The lungs are otherwise clear.  The pleural spaces are clear.  The cardiac silhouette is unremarkable.  Osseous structures are intact.  Impression: Right lower lobe pneumonia.    Electronically signed by: Clarence Paris  Date:    01/09/2024  Time:    17:18        Review of Systems   Constitutional:  Negative for activity change, appetite change, chills, fatigue and unexpected weight change.   HENT:  Negative for congestion, ear pain, postnasal drip,  sneezing, sore throat and trouble swallowing.    Eyes:  Negative for pain and visual disturbance.   Respiratory:  Negative for cough and shortness of breath.    Cardiovascular:  Negative for chest pain and leg swelling.   Gastrointestinal:  Negative for abdominal pain, constipation, diarrhea, nausea and vomiting.   Endocrine: Negative for cold intolerance and heat intolerance.   Genitourinary:  Negative for difficulty urinating, dysuria and flank pain.   Musculoskeletal:  Negative for arthralgias, back pain, joint swelling and neck pain.   Skin:  Negative for color change and rash.   Neurological:  Negative for dizziness, seizures and headaches.   Psychiatric/Behavioral:  Negative for behavioral problems, dysphoric mood and sleep disturbance. The patient is not nervous/anxious.      Objective:     Vitals:    02/27/24 0827   BP: 110/70   Pulse: 64   Temp: 96.6 °F (35.9 °C)   SpO2: 99%   Weight: 75.4 kg (166 lb 1.9 oz)     Wt Readings from Last 10 Encounters:   02/27/24 75.4 kg (166 lb 1.9 oz)   01/09/24 84.6 kg (186 lb 8.2 oz)   05/02/23 84.6 kg (186 lb 8.2 oz)   02/10/23 94.8 kg (209 lb)   02/09/23 95.1 kg (209 lb 10.5 oz)   07/25/22 102.5 kg (226 lb)   03/22/22 105.2 kg (232 lb)   03/07/22 110.5 kg (243 lb 9.7 oz)   10/04/21 110.1 kg (242 lb 11.6 oz)   07/15/21 108.3 kg (238 lb 12.1 oz)     Physical Exam  Vitals reviewed.   Constitutional:       General: He is not in acute distress.     Appearance: Normal appearance. He is well-developed and normal weight.   HENT:      Head: Normocephalic and atraumatic.      Right Ear: Tympanic membrane and external ear normal.      Left Ear: Tympanic membrane and external ear normal.      Nose: Nose normal.      Mouth/Throat:      Mouth: Mucous membranes are moist.      Pharynx: Oropharynx is clear.   Eyes:      Conjunctiva/sclera: Conjunctivae normal.      Pupils: Pupils are equal, round, and reactive to light.   Neck:      Thyroid: No thyromegaly.   Cardiovascular:      Rate  and Rhythm: Normal rate and regular rhythm.      Heart sounds: No murmur heard.     No friction rub. No gallop.   Pulmonary:      Effort: Pulmonary effort is normal. No respiratory distress.      Breath sounds: Normal breath sounds.   Abdominal:      General: Bowel sounds are normal. There is no distension.      Palpations: Abdomen is soft.      Tenderness: There is no abdominal tenderness. There is no rebound.   Musculoskeletal:         General: Normal range of motion.        Arms:       Cervical back: Normal range of motion and neck supple.      Comments: Bony variant in right clavicle more prominent since losing weight   Lymphadenopathy:      Cervical: No cervical adenopathy.   Skin:     General: Skin is warm and dry.   Neurological:      General: No focal deficit present.      Mental Status: He is alert and oriented to person, place, and time.      Coordination: Coordination normal.   Psychiatric:         Attention and Perception: Attention normal.         Mood and Affect: Mood and affect normal.         Speech: Speech normal.         Behavior: Behavior normal.         Thought Content: Thought content normal.         Cognition and Memory: Cognition normal.         Judgment: Judgment normal.       Assessment:     1. Routine general medical examination at a health care facility    2. Constipation, unspecified constipation type    3. JERRELL on CPAP    4. Hair thinning    5. Prostate cancer screening      Plan:   Luiz was seen today for annual exam.    Diagnoses and all orders for this visit:    Routine general medical examination at a health care facility  -     TSH; Future  -     Hemoglobin A1C; Future  -     Lipid Panel; Future  -     Comprehensive Metabolic Panel; Future  -     CBC Auto Differential; Future  -     PSA, Screening; Future  -     Uric Acid; Future    Constipation, unspecified constipation type    JERRELL on CPAP    Hair thinning  -     finasteride (PROPECIA) 1 mg tablet; Take 1 tablet (1 mg total) by  mouth once daily.    Prostate cancer screening  -     PSA, Screening; Future      Lab work ordered to be completed today.    Discussed Health Maintenance issues.     Refilled Rx Finasteride 1 mg/day for hair thinning treatment.   Advised on weight training exercise; continue with cardio and healthy eating habits. Discussed strength training. Pt is doing compounded Mounjaro through [facility]. Currently on 10mg a week. Discussed options for nutritional consult through [facility] insurance plan if desired. Also recommend weight training for runners. Declines pna vaccine today.   Instructed to f/u in 1 year.     There are no preventive care reminders to display for this patient.    The 10-year ASCVD risk score (Mikhail MOCTEZUMA, et al., 2019) is: 0.6%    Values used to calculate the score:      Age: 44 years      Sex: Male      Is Non- : No      Diabetic: No      Tobacco smoker: No      Systolic Blood Pressure: 110 mmHg      Is BP treated: No      HDL Cholesterol: 71 mg/dL      Total Cholesterol: 181 mg/dL  Follow up in about 1 year (around 2/27/2025) for physical with Dr ARAGON.    There are no Patient Instructions on file for this visit.    Scribe Attestation:   I, Jalil Moraes, am scribing for, and in the presence of, Dr. Gisella Aragon MD. I performed the above scribed service and the documentation accurately describes the services I performed. I attest to the accuracy of the note.    I, Dr. Gisella Aragon MD, reviewed documentation as scribed above. I personally performed the services described in this documentation.  I agree that the record reflects my personal performance and is accurate and complete. Gisella Aragon MD.    02/27/2024

## 2024-04-03 ENCOUNTER — PATIENT MESSAGE (OUTPATIENT)
Dept: PULMONOLOGY | Facility: CLINIC | Age: 44
End: 2024-04-03
Payer: COMMERCIAL

## 2024-12-04 ENCOUNTER — OFFICE VISIT (OUTPATIENT)
Dept: SURGERY | Facility: CLINIC | Age: 44
End: 2024-12-04
Payer: COMMERCIAL

## 2024-12-04 VITALS
BODY MASS INDEX: 24.34 KG/M2 | TEMPERATURE: 97 F | SYSTOLIC BLOOD PRESSURE: 112 MMHG | DIASTOLIC BLOOD PRESSURE: 68 MMHG | HEIGHT: 70 IN | HEART RATE: 62 BPM | WEIGHT: 170 LBS

## 2024-12-04 DIAGNOSIS — K40.90 RIGHT INGUINAL HERNIA: Primary | ICD-10-CM

## 2024-12-04 PROCEDURE — 99999 PR PBB SHADOW E&M-EST. PATIENT-LVL IV: CPT | Mod: PBBFAC,,, | Performed by: SURGERY

## 2024-12-04 PROCEDURE — 99499 UNLISTED E&M SERVICE: CPT | Mod: S$GLB,,, | Performed by: SURGERY

## 2024-12-04 RX ORDER — SODIUM CHLORIDE 9 MG/ML
INJECTION, SOLUTION INTRAVENOUS CONTINUOUS
OUTPATIENT
Start: 2024-12-04

## 2024-12-04 RX ORDER — CEFAZOLIN SODIUM 2 G/50ML
2 SOLUTION INTRAVENOUS
OUTPATIENT
Start: 2024-12-04

## 2024-12-04 NOTE — PROGRESS NOTES
History & Physical    Subjective     History of Present Illness:  Patient is a 44 y.o. male referred for right inguinal hernia.  He reports bulge in the right groin over the past month which has progressively become larger.  Notices it more when standing and at the end of the day.  Causes discomfort when poking out.    No chief complaint on file.      Review of patient's allergies indicates:  No Known Allergies    Current Outpatient Medications   Medication Sig Dispense Refill    ascorbic acid, vitamin C, (VITAMIN C) 500 MG tablet Take 500 mg by mouth once daily.      bisacodyL (DULCOLAX) 5 mg EC tablet Take 10 mg by mouth once.      docusate sodium (COLACE) 100 MG capsule Take 300 mg by mouth once daily.      finasteride (PROPECIA) 1 mg tablet Take 1 tablet (1 mg total) by mouth once daily. 90 tablet 3    linaCLOtide (LINZESS) 72 mcg Cap capsule Take 1 capsule (72 mcg total) by mouth before breakfast. 30 capsule 1    tirzepatide (MOUNJARO) 10 mg/0.5 mL PnIj Inject 10 mg into the skin every 7 days.      albuterol (VENTOLIN HFA) 90 mcg/actuation inhaler Inhale 2 puffs into the lungs every 6 (six) hours as needed for Wheezing. Rescue 18 g 0    LORazepam (ATIVAN) 1 MG tablet Take 1 tablet by mouth the night before appointment and 1 hour prior to appt 2 tablet 0    ondansetron (ZOFRAN-ODT) 4 MG TbDL Place 1-2 tablets (4 mg total) under tongue every 8 (eight) hours as needed for nausea 30 tablet 0     No current facility-administered medications for this visit.       Past Medical History:   Diagnosis Date    Gout     Hypoglycemia     Nephrolithiasis      Past Surgical History:   Procedure Laterality Date    COLONOSCOPY N/A 11/6/2017    Procedure: COLONOSCOPY;  Surgeon: Jovani Colón III, MD;  Location: [facility];  Service: Endoscopy;  Laterality: N/A;    COLONOSCOPY N/A 12/1/2020    Procedure: COLONOSCOPY;  Surgeon: Sima Reilly MD;  Location: [facility];  Service: Endoscopy;  Laterality: N/A;    KNEE SURGERY    "    Family History   Problem Relation Name Age of Onset    Diabetes Father      Diabetes Sister      Colon polyps Sister      Diabetes Maternal Grandmother      Colon cancer Maternal Grandmother      Colon polyps Maternal Grandmother      Glaucoma Maternal Grandmother      Diabetes Paternal Grandmother      Glaucoma Paternal Grandmother      Cancer Paternal Grandfather      Colon cancer Maternal Uncle      Colon polyps Mother      Heart disease Neg Hx       Social History     Tobacco Use    Smoking status: Never     Passive exposure: Never    Smokeless tobacco: Never   Substance Use Topics    Alcohol use: No    Drug use: No        Review of Systems:  Review of Systems   Constitutional:  Negative for activity change and unexpected weight change.   HENT:  Negative for hearing loss, rhinorrhea and trouble swallowing.    Eyes:  Negative for discharge and visual disturbance.   Respiratory:  Negative for chest tightness and wheezing.    Cardiovascular:  Negative for chest pain and palpitations.   Gastrointestinal:  Negative for blood in stool, constipation, diarrhea and vomiting.   Endocrine: Negative for polydipsia and polyuria.   Genitourinary:  Negative for difficulty urinating, hematuria and urgency.   Musculoskeletal:  Negative for arthralgias, joint swelling and neck pain.   Neurological:  Negative for weakness and headaches.   Psychiatric/Behavioral:  Negative for confusion and dysphoric mood.         Objective     Vital Signs (Most Recent)  Temp: 97.3 °F (36.3 °C) (12/04/24 1216)  Pulse: 62 (12/04/24 1216)  BP: 112/68 (12/04/24 1216)  5' 10" (1.778 m)  77.1 kg (169 lb 15.6 oz)     Physical Exam:  Physical Exam  Vitals reviewed.   Constitutional:       Appearance: He is well-developed.   HENT:      Head: Normocephalic and atraumatic.   Cardiovascular:      Rate and Rhythm: Normal rate.   Pulmonary:      Effort: Pulmonary effort is normal.   Abdominal:      General: There is no distension.      Palpations: Abdomen " is soft.      Tenderness: There is no abdominal tenderness.      Hernia: A hernia (right inguinal hernia reducible, mildly tender to palpation) is present.   Musculoskeletal:      Cervical back: Normal range of motion.   Skin:     General: Skin is warm and dry.   Neurological:      Mental Status: He is alert and oriented to person, place, and time.          Assessment and Plan     44-year-old male with right inguinal hernia    PLAN:    Robotic right explore left inguinal hernia repair with mesh (we will coordinate with surgery for vasectomy with )  Preop:  CBC, CMP, EKG  Discussed risks and benefits of inguinal hernia repair including but not limited to:  Pain, bleeding, and infection, injury to spermatic cord, testicular atrophy or loss, nerve pain which may be long lasting, recurrence, need for further surgery

## 2024-12-04 NOTE — LETTER
December 4, 2024        Andrew Somers MD  [address]  [place] [address]             [address]  [address]  [place] [address]  Phone: [phone]  Fax: [phone]   Patient: Luiz Ybarra   MR Number: [id number]   YOB: 1980   Date of Visit: 12/4/2024     Dear Dr. Somers,     Let me know once you see him and we can coordinate surgery date for vasectomy and hernia repair.    Sincerely,       Shekhar Orta MD            CC  No Recipients    Enclosure

## 2024-12-09 ENCOUNTER — OFFICE VISIT (OUTPATIENT)
Dept: UROLOGY | Facility: CLINIC | Age: 44
End: 2024-12-09
Payer: COMMERCIAL

## 2024-12-09 VITALS — WEIGHT: 169.75 LBS | BODY MASS INDEX: 24.3 KG/M2 | HEIGHT: 70 IN

## 2024-12-09 DIAGNOSIS — Z30.09 VASECTOMY EVALUATION: Primary | ICD-10-CM

## 2024-12-09 PROCEDURE — 3044F HG A1C LEVEL LT 7.0%: CPT | Mod: CPTII,S$GLB,, | Performed by: UROLOGY

## 2024-12-09 PROCEDURE — 1159F MED LIST DOCD IN RCRD: CPT | Mod: CPTII,S$GLB,, | Performed by: UROLOGY

## 2024-12-09 PROCEDURE — 99999 PR PBB SHADOW E&M-EST. PATIENT-LVL IV: CPT | Mod: PBBFAC,,, | Performed by: UROLOGY

## 2024-12-09 PROCEDURE — 3008F BODY MASS INDEX DOCD: CPT | Mod: CPTII,S$GLB,, | Performed by: UROLOGY

## 2024-12-09 PROCEDURE — 99204 OFFICE O/P NEW MOD 45 MIN: CPT | Mod: S$GLB,,, | Performed by: UROLOGY

## 2024-12-09 RX ORDER — CEFAZOLIN SODIUM 2 G/50ML
2 SOLUTION INTRAVENOUS
OUTPATIENT
Start: 2024-12-09

## 2024-12-09 NOTE — PROGRESS NOTES
HPI:    Mr. Ybarra is a 44 y.o. male who presents for evaluation re: vasectomy. He has 2 children, he is certain that he desires no more. He is aware of other forms of contraception. He is aware that vasectomy is to be considered permanent/irreversible.    PATIENT HISTORY:    Past Medical History:   Diagnosis Date    Gout     Hypoglycemia     Nephrolithiasis     Vasectomy evaluation 12/9/2024       Past Surgical History:   Procedure Laterality Date    COLONOSCOPY N/A 11/6/2017    Procedure: COLONOSCOPY;  Surgeon: Jovani Colón III, MD;  Location: [facility] ENDO;  Service: Endoscopy;  Laterality: N/A;    COLONOSCOPY N/A 12/1/2020    Procedure: COLONOSCOPY;  Surgeon: Sima Reilly MD;  Location: [facility] ENDO;  Service: Endoscopy;  Laterality: N/A;    KNEE SURGERY         Family History   Problem Relation Name Age of Onset    Diabetes Father      Diabetes Sister      Colon polyps Sister      Diabetes Maternal Grandmother      Colon cancer Maternal Grandmother      Colon polyps Maternal Grandmother      Glaucoma Maternal Grandmother      Diabetes Paternal Grandmother      Glaucoma Paternal Grandmother      Cancer Paternal Grandfather      Colon cancer Maternal Uncle      Colon polyps Mother      Heart disease Neg Hx         Social History     Socioeconomic History    Marital status:     Number of children: 2   Tobacco Use    Smoking status: Never     Passive exposure: Never    Smokeless tobacco: Never   Substance and Sexual Activity    Alcohol use: No    Drug use: No    Sexual activity: Yes     Partners: Female   Social History Narrative     [wife [facility]ian], 2 daughters, 1 cat and 3 dogs.     Social Drivers of Health     Financial Resource Strain: Low Risk  (2/19/2024)    Overall Financial Resource Strain (CARDIA)     Difficulty of Paying Living Expenses: Not hard at all   Food Insecurity: No Food Insecurity (2/19/2024)    Hunger Vital Sign     Worried About Running Out of Food in the Last Year:  Never true     Ran Out of Food in the Last Year: Never true   Transportation Needs: No Transportation Needs (2/19/2024)    PRAPARE - Transportation     Lack of Transportation (Medical): No     Lack of Transportation (Non-Medical): No   Physical Activity: Sufficiently Active (2/19/2024)    Exercise Vital Sign     Days of Exercise per Week: 7 days     Minutes of Exercise per Session: 120 min   Stress: No Stress Concern Present (2/19/2024)    [employer] [place] [employer] - Occupational Stress Questionnaire     Feeling of Stress : Only a little   Housing Stability: Low Risk  (2/19/2024)    Housing Stability Vital Sign     Unable to Pay for Housing in the Last Year: No     Number of Places Lived in the Last Year: 1     Unstable Housing in the Last Year: No       Medications: Per Epic List    Allergies:  Patient has no known allergies.    Review of Systems:  General: No fever, chills, fatigability, or weight loss.  Skin: No rashes, itching, or changes in color or texture of skin.  Chest: Denies VILLATORO, cyanosis, wheezing, cough, and sputum production.  Abdomen: Appetite fine. No weight loss. Denies diarrhea, abdominal pain, hematemesis, or blood in stool.  Musculoskeletal: No joint stiffness or swelling. Denies back pain.  : As above.  All other review of systems negative.    PHYSICAL EXAM:    General appearance: Well-developed, well-nourished male in no apparent distress.  Mental status: Alert and oriented. Cooperative with normal affect.  Neuro: Motor and sensory exams grossly intact.  HEENT: Normocephalic. Atraumatic.  Neck: Normal ROM.  Chest: Non labored breathing.  Heart: Regular rate and rhythm.  Abdomen: Soft, non-distended, non-tender. No masses or organomegaly. No evidence of inguinal hernia.  Genitourinary: Penis is normal with no evidence of abnormal masses or tenderness. Epididymis, vas deferens, and cord structures are normal bilaterally.  Extremities: No cyanosis, clubbing, or  edema.    IMPRESSION / PLAN:    Luiz Ybarra is a 44 y.o. male who presents for evaluation for a vasectomy.    I discussed with the patient risks and benefits, as well as alternatives. We discussed possible complications at length, including fever, infection, bleeding (possibly requiring re-operation), testicular injury or atrophy with loss of function, chronic pain, persistent or recurrent presence of sperm in the ejaculate, or vasal recanalization, as well as the risks attendant to the anesthetic.  We discussed that he should stop aspirin, ibuprofen, and similar products at least one week prior to the procedure. Acetaminophen is okay to use for headaches, pain, etc.  He should bring an athletic supporter and loose-fitting sweat pants on the day of the procedure.  We discussed that he must continue to use contraception until two consecutive semen analyses (checked at approximately 2-3 months post-vasectomy) reveal azoospermia.    He will schedule an elective vasectomy.

## 2024-12-09 NOTE — H&P (VIEW-ONLY)
HPI:    Mr. Ybarra is a 44 y.o. male who presents for evaluation re: vasectomy. He has 2 children, he is certain that he desires no more. He is aware of other forms of contraception. He is aware that vasectomy is to be considered permanent/irreversible.    PATIENT HISTORY:    Past Medical History:   Diagnosis Date    Gout     Hypoglycemia     Nephrolithiasis     Vasectomy evaluation 12/9/2024       Past Surgical History:   Procedure Laterality Date    COLONOSCOPY N/A 11/6/2017    Procedure: COLONOSCOPY;  Surgeon: Jovani Colón III, MD;  Location: [facility] [place] ENDO;  Service: Endoscopy;  Laterality: N/A;    COLONOSCOPY N/A 12/1/2020    Procedure: COLONOSCOPY;  Surgeon: Sima Reilly MD;  Location: [facility] [place] ENDO;  Service: Endoscopy;  Laterality: N/A;    KNEE SURGERY         Family History   Problem Relation Name Age of Onset    Diabetes Father      Diabetes Sister      Colon polyps Sister      Diabetes Maternal Grandmother      Colon cancer Maternal Grandmother      Colon polyps Maternal Grandmother      Glaucoma Maternal Grandmother      Diabetes Paternal Grandmother      Glaucoma Paternal Grandmother      Cancer Paternal Grandfather      Colon cancer Maternal Uncle      Colon polyps Mother      Heart disease Neg Hx         Social History     Socioeconomic History    Marital status:     Number of children: 2   Tobacco Use    Smoking status: Never     Passive exposure: Never    Smokeless tobacco: Never   Substance and Sexual Activity    Alcohol use: No    Drug use: No    Sexual activity: Yes     Partners: Female   Social History Narrative     [wife [facility]], 2 daughters, 1 cat and 3 dogs.     Social Drivers of Health     Financial Resource Strain: Low Risk  (2/19/2024)    Overall Financial Resource Strain (CARDIA)     Difficulty of Paying Living Expenses: Not hard at all   Food Insecurity: No Food Insecurity (2/19/2024)    Hunger Vital Sign     Worried About Running Out of Food in the Last Year:  Never true     Ran Out of Food in the Last Year: Never true   Transportation Needs: No Transportation Needs (2/19/2024)    PRAPARE - Transportation     Lack of Transportation (Medical): No     Lack of Transportation (Non-Medical): No   Physical Activity: Sufficiently Active (2/19/2024)    Exercise Vital Sign     Days of Exercise per Week: 7 days     Minutes of Exercise per Session: 120 min   Stress: No Stress Concern Present (2/19/2024)    [employer] - Occupational Stress Questionnaire     Feeling of Stress : Only a little   Housing Stability: Low Risk  (2/19/2024)    Housing Stability Vital Sign     Unable to Pay for Housing in the Last Year: No     Number of Places Lived in the Last Year: 1     Unstable Housing in the Last Year: No       Medications: Per Epic List    Allergies:  Patient has no known allergies.    Review of Systems:  General: No fever, chills, fatigability, or weight loss.  Skin: No rashes, itching, or changes in color or texture of skin.  Chest: Denies VILLATORO, cyanosis, wheezing, cough, and sputum production.  Abdomen: Appetite fine. No weight loss. Denies diarrhea, abdominal pain, hematemesis, or blood in stool.  Musculoskeletal: No joint stiffness or swelling. Denies back pain.  : As above.  All other review of systems negative.    PHYSICAL EXAM:    General appearance: Well-developed, well-nourished male in no apparent distress.  Mental status: Alert and oriented. Cooperative with normal affect.  Neuro: Motor and sensory exams grossly intact.  HEENT: Normocephalic. Atraumatic.  Neck: Normal ROM.  Chest: Non labored breathing.  Heart: Regular rate and rhythm.  Abdomen: Soft, non-distended, non-tender. No masses or organomegaly. No evidence of inguinal hernia.  Genitourinary: Penis is normal with no evidence of abnormal masses or tenderness. Epididymis, vas deferens, and cord structures are normal bilaterally.  Extremities: No cyanosis, clubbing, or  edema.    IMPRESSION / PLAN:    Luiz Ybarra is a 44 y.o. male who presents for evaluation for a vasectomy.    I discussed with the patient risks and benefits, as well as alternatives. We discussed possible complications at length, including fever, infection, bleeding (possibly requiring re-operation), testicular injury or atrophy with loss of function, chronic pain, persistent or recurrent presence of sperm in the ejaculate, or vasal recanalization, as well as the risks attendant to the anesthetic.  We discussed that he should stop aspirin, ibuprofen, and similar products at least one week prior to the procedure. Acetaminophen is okay to use for headaches, pain, etc.  He should bring an athletic supporter and loose-fitting sweat pants on the day of the procedure.  We discussed that he must continue to use contraception until two consecutive semen analyses (checked at approximately 2-3 months post-vasectomy) reveal azoospermia.    He will schedule an elective vasectomy.

## 2024-12-11 ENCOUNTER — PATIENT MESSAGE (OUTPATIENT)
Dept: UROLOGY | Facility: CLINIC | Age: 44
End: 2024-12-11
Payer: COMMERCIAL

## 2024-12-11 ENCOUNTER — TELEPHONE (OUTPATIENT)
Dept: UROLOGY | Facility: CLINIC | Age: 44
End: 2024-12-11
Payer: COMMERCIAL

## 2024-12-31 ENCOUNTER — HOSPITAL ENCOUNTER (OUTPATIENT)
Dept: CARDIOLOGY | Facility: HOSPITAL | Age: 44
Discharge: HOME | End: 2024-12-31
Attending: SURGERY
Payer: COMMERCIAL

## 2024-12-31 DIAGNOSIS — K40.90 RIGHT INGUINAL HERNIA: ICD-10-CM

## 2024-12-31 LAB
OHS QRS DURATION: 90 MS
OHS QTC CALCULATION: 408 MS

## 2024-12-31 PROCEDURE — 93005 ELECTROCARDIOGRAM TRACING: CPT

## 2024-12-31 PROCEDURE — 93010 ELECTROCARDIOGRAM REPORT: CPT | Mod: ,,, | Performed by: INTERNAL MEDICINE

## 2025-01-03 ENCOUNTER — TELEPHONE (OUTPATIENT)
Dept: PREADMISSION TESTING | Facility: HOSPITAL | Age: 45
End: 2025-01-03
Payer: COMMERCIAL

## 2025-01-03 NOTE — TELEPHONE ENCOUNTER
Pre op instructions reviewed with pt over telephone, verbalized understanding.    Procedure Date: 1/07/25  Arrival Time:  TBD; We will call you after 2pm the day before your procedure with your arrival time.    Address:   [facility] (Off [facility], 2nd Building on the left)  [id number] [place] [address] [place]. [id number]  >>>Please enter through front entrance Lobby of 1st floor to Registration desk<<<      !!!INSTRUCTIONS IMPORTANT!!!  NO FOOD or tobacco products after midnight the night before surgery! You may have clear liquids up to 3 hrs before your arrival to the Hospital  Clear liquids include Gatorade, water, soda, black coffee or tea (no milk or creamer), and clear juices.  Clear liquids do NOT include anything with pulp or food particles (Chicken broth, ice cream, yogurt, Jello, etc.)    >>>MEDICATION INSTRUCTIONS<<<: Morning of Surgery, take small sip of water with ONLY these medications:  NONE    Additional Instructions: HOLD MOUNJARO 7 DAYS PRIOR TO SURGERY!    *Diabetic/ Prediabetic Patients: !!!If you take diabetic or weight loss medication, Do NOT take morning of surgery unless instructed by Doctor!!!  Metformin to be stopped 24 hrs prior to surgery.   Long Acting Insulin Instructions: HOLD the night before surgery unless instructed differently by Provider!  Ozempic/ Mounjaro/ Wegovy/ Trulicity/ Semaglutide injections or weight loss medication to be stopped 7 days prior to surgery.    !!!STOP ALL Aspirins, NSAIDS, WEIGHT LOSS INJECTIONS/PILLS, Herbal supplements, & Vitamins 7 DAYS BEFORE SURGERY!!!    ____  Avoid Alcoholic beverages 3 days prior to surgery, as it can thin the blood.  ____  NO Acrylic/fake nails or nail polish worn day of surgery (specifically hand/arm & foot surgeries).  ____  NO powder, lotions, deodorants, oils or cream on body.  ____  Remove all jewelry & piercings & foreign objects before arrival & leave at home.  ____  Remove Dentures, Hearing Aids & Contact Lens prior  to surgery.  ____  Bring photo ID and insurance information to hospital (Leave Valuables at Home).  ____  If going home the same day, arrange for a ride home. You will not be able to drive for 24 hrs if Anesthesia was used.   ____  Females (ages 11-60): may need to give a urine sample the morning of surgery; please see Pre op Nurse prior to using the restroom.  ____  Males: Stop ED medications (Viagra, Cialis) 24 hrs prior to surgery.  ____  Wear clean, loose fitting clothing to allow for dressings/ bandages.      Bathing Instructions:    -Shower with anti-bacterial Soap (Hibiclens or Dial) the night before surgery and the morning of.   -Do not use Hibiclens on your face or genitals.   -Apply clean clothes after shower.  -Do not shave your face or body 2 days prior to surgery unless instructed otherwise by your Surgeon.  -Do not shave pubic hair 7 days prior to surgery (gyn pt's).    [facility] Visitor/Ride Policy:  Only 2 adults allowed in pre op/recovery area during your procedure. You MUST HAVE A RIDE HOME from a responsible adult that you know and trust. Medical Transport, Uber or Lyft can ONLY be used if patient has a responsible adult to accompany them during ride home.       *Signs and symptoms of Infection Before or After Surgery:               !!!If you experience any fever, chills, nausea/ vomiting, foul odor/ excessive drainage from surgical site, flu-like symptoms, new wounds or cuts, PLEASE CALL THE SURGEON OFFICE at [phone] or SEND MESSAGE THROUGH [facility] PORTAL!!!     *If you are running late the morning of surgery, please call the Hospital Surgery Dept @ [phone].     *Billing questions:  [phone]     Thank you,  -[facility] Pre Admit Dept.  [phone] or [phone]  M-F 7:30 am-4:00 pm (Closed Major Holidays)

## 2025-01-06 ENCOUNTER — TELEPHONE (OUTPATIENT)
Dept: PREADMISSION TESTING | Facility: HOSPITAL | Age: 45
End: 2025-01-06
Payer: COMMERCIAL

## 2025-01-06 ENCOUNTER — PATIENT MESSAGE (OUTPATIENT)
Dept: PREADMISSION TESTING | Facility: HOSPITAL | Age: 45
End: 2025-01-06
Payer: COMMERCIAL

## 2025-01-06 NOTE — PRE-PROCEDURE INSTRUCTIONS
Called and spoke with pt about the following:     Please arrive to [facility] ([facility] at 0845 am on 1/7/25 for your scheduled procedure.  Address: [address] [place] [address] [place] [address] (2nd Building on left, 1st Floor Lobby)    !!!NO FOOD after midnight! You may have clear liquids up to 3 hrs before your arrival to the Hospital!!!  Clear liquids include Gatorade, water, soda, black coffee or tea (no milk or creamer), and clear juices.  Clear liquids do NOT include anything with pulp or food particles (Chicken broth, ice cream, yogurt, Jello, etc.)    Thank you,  -[facility] Pre Admit Dept.  Mon-Fri 7:30 am - 4 pm [phone]

## 2025-01-06 NOTE — TELEPHONE ENCOUNTER
----- Message from Med Assistant Sanford sent at 1/6/2025  3:33 PM CST -----  Regarding: FW: surgery 1/07  Dr Somers said he will be able to sign the consent in the morning. Thanks  ----- Message -----  From: Mildred Mcdaniels RN  Sent: 1/6/2025   8:43 AM CST  To: [facility] Morales Staff  Subject: surgery 1/07                                     Good Morning.    Pt scheduled for surgery tomorrow.  Surgery consent missing Dr. Somers's signature    Thank you,    Mildred BOLAÑOS

## 2025-01-07 ENCOUNTER — TELEPHONE (OUTPATIENT)
Dept: UROLOGY | Facility: CLINIC | Age: 45
End: 2025-01-07
Payer: COMMERCIAL

## 2025-01-07 ENCOUNTER — ANESTHESIA (OUTPATIENT)
Dept: SURGERY | Facility: HOSPITAL | Age: 45
End: 2025-01-07
Payer: COMMERCIAL

## 2025-01-07 ENCOUNTER — HOSPITAL ENCOUNTER (OUTPATIENT)
Facility: HOSPITAL | Age: 45
Discharge: HOME | End: 2025-01-07
Attending: UROLOGY | Admitting: UROLOGY
Payer: COMMERCIAL

## 2025-01-07 ENCOUNTER — ANESTHESIA EVENT (OUTPATIENT)
Dept: SURGERY | Facility: HOSPITAL | Age: 45
End: 2025-01-07
Payer: COMMERCIAL

## 2025-01-07 VITALS
RESPIRATION RATE: 11 BRPM | HEART RATE: 62 BPM | TEMPERATURE: 97 F | HEIGHT: 70 IN | OXYGEN SATURATION: 95 % | WEIGHT: 168.31 LBS | BODY MASS INDEX: 24.09 KG/M2 | SYSTOLIC BLOOD PRESSURE: 109 MMHG | DIASTOLIC BLOOD PRESSURE: 67 MMHG

## 2025-01-07 DIAGNOSIS — Z30.09 VASECTOMY EVALUATION: ICD-10-CM

## 2025-01-07 DIAGNOSIS — K40.20 NON-RECURRENT BILATERAL INGUINAL HERNIA WITHOUT OBSTRUCTION OR GANGRENE: Primary | ICD-10-CM

## 2025-01-07 DIAGNOSIS — K40.90 RIGHT INGUINAL HERNIA: ICD-10-CM

## 2025-01-07 PROCEDURE — 49650 LAP ING HERNIA REPAIR INIT: CPT | Mod: 50,AS,,

## 2025-01-07 PROCEDURE — 88302 TISSUE EXAM BY PATHOLOGIST: CPT | Performed by: PATHOLOGY

## 2025-01-07 PROCEDURE — 71000033 HC RECOVERY, INTIAL HOUR: Performed by: UROLOGY

## 2025-01-07 PROCEDURE — 55250 REMOVAL OF SPERM DUCT(S): CPT | Mod: ,,, | Performed by: UROLOGY

## 2025-01-07 PROCEDURE — 36000711: Performed by: UROLOGY

## 2025-01-07 PROCEDURE — 49650 LAP ING HERNIA REPAIR INIT: CPT | Mod: 50,,, | Performed by: SURGERY

## 2025-01-07 PROCEDURE — 63600175 PHARM REV CODE 636 W HCPCS: Performed by: UROLOGY

## 2025-01-07 PROCEDURE — 63600175 PHARM REV CODE 636 W HCPCS: Performed by: SURGERY

## 2025-01-07 PROCEDURE — 37000008 HC ANESTHESIA 1ST 15 MINUTES: Performed by: UROLOGY

## 2025-01-07 PROCEDURE — 36000710: Performed by: UROLOGY

## 2025-01-07 PROCEDURE — 63600175 PHARM REV CODE 636 W HCPCS: Performed by: STUDENT IN AN ORGANIZED HEALTH CARE EDUCATION/TRAINING PROGRAM

## 2025-01-07 PROCEDURE — 37000009 HC ANESTHESIA EA ADD 15 MINS: Performed by: UROLOGY

## 2025-01-07 PROCEDURE — 71000015 HC POSTOP RECOV 1ST HR: Performed by: UROLOGY

## 2025-01-07 PROCEDURE — 63600175 PHARM REV CODE 636 W HCPCS: Performed by: NURSE ANESTHETIST, CERTIFIED REGISTERED

## 2025-01-07 PROCEDURE — 71000039 HC RECOVERY, EACH ADD'L HOUR: Performed by: UROLOGY

## 2025-01-07 PROCEDURE — C1781 MESH (IMPLANTABLE): HCPCS | Performed by: UROLOGY

## 2025-01-07 PROCEDURE — 25000003 PHARM REV CODE 250: Performed by: NURSE ANESTHETIST, CERTIFIED REGISTERED

## 2025-01-07 PROCEDURE — 27201423 OPTIME MED/SURG SUP & DEVICES STERILE SUPPLY: Performed by: UROLOGY

## 2025-01-07 PROCEDURE — 88302 TISSUE EXAM BY PATHOLOGIST: CPT | Mod: 26,,, | Performed by: PATHOLOGY

## 2025-01-07 DEVICE — LAPAROSCOPIC SELF-FIXATING MESH, LEFT ANATOMICAL
Type: IMPLANTABLE DEVICE | Site: GROIN | Status: FUNCTIONAL
Brand: PROGRIP

## 2025-01-07 DEVICE — LAPAROSCOPIC SELF-FIXATING MESH, RIGHT ANATOMICAL
Type: IMPLANTABLE DEVICE | Site: GROIN | Status: FUNCTIONAL
Brand: PROGRIP

## 2025-01-07 RX ORDER — MIDAZOLAM HYDROCHLORIDE 1 MG/ML
INJECTION INTRAMUSCULAR; INTRAVENOUS
Status: DISCONTINUED | OUTPATIENT
Start: 2025-01-07 | End: 2025-01-07

## 2025-01-07 RX ORDER — DEXAMETHASONE SODIUM PHOSPHATE 4 MG/ML
INJECTION, SOLUTION INTRA-ARTICULAR; INTRALESIONAL; INTRAMUSCULAR; INTRAVENOUS; SOFT TISSUE
Status: DISCONTINUED | OUTPATIENT
Start: 2025-01-07 | End: 2025-01-07

## 2025-01-07 RX ORDER — ONDANSETRON HYDROCHLORIDE 2 MG/ML
INJECTION, SOLUTION INTRAVENOUS
Status: DISCONTINUED | OUTPATIENT
Start: 2025-01-07 | End: 2025-01-07

## 2025-01-07 RX ORDER — ONDANSETRON HYDROCHLORIDE 2 MG/ML
4 INJECTION, SOLUTION INTRAVENOUS DAILY PRN
Status: DISCONTINUED | OUTPATIENT
Start: 2025-01-07 | End: 2025-01-07 | Stop reason: HOSPADM

## 2025-01-07 RX ORDER — SUCCINYLCHOLINE CHLORIDE 20 MG/ML
INJECTION INTRAMUSCULAR; INTRAVENOUS
Status: DISCONTINUED | OUTPATIENT
Start: 2025-01-07 | End: 2025-01-07

## 2025-01-07 RX ORDER — GLUCAGON 1 MG
1 KIT INJECTION
Status: DISCONTINUED | OUTPATIENT
Start: 2025-01-07 | End: 2025-01-07 | Stop reason: HOSPADM

## 2025-01-07 RX ORDER — HYDROMORPHONE HYDROCHLORIDE 1 MG/ML
0.2 INJECTION, SOLUTION INTRAMUSCULAR; INTRAVENOUS; SUBCUTANEOUS EVERY 5 MIN PRN
Status: DISCONTINUED | OUTPATIENT
Start: 2025-01-07 | End: 2025-01-07 | Stop reason: HOSPADM

## 2025-01-07 RX ORDER — LIDOCAINE HYDROCHLORIDE 10 MG/ML
INJECTION, SOLUTION INFILTRATION; PERINEURAL
Status: DISCONTINUED | OUTPATIENT
Start: 2025-01-07 | End: 2025-01-07 | Stop reason: HOSPADM

## 2025-01-07 RX ORDER — HYDROCODONE BITARTRATE AND ACETAMINOPHEN 10; 325 MG/1; MG/1
1 TABLET ORAL EVERY 6 HOURS PRN
Qty: 15 TABLET | Refills: 0 | Status: SHIPPED | OUTPATIENT
Start: 2025-01-07

## 2025-01-07 RX ORDER — ROCURONIUM BROMIDE 10 MG/ML
INJECTION, SOLUTION INTRAVENOUS
Status: DISCONTINUED | OUTPATIENT
Start: 2025-01-07 | End: 2025-01-07

## 2025-01-07 RX ORDER — CEFAZOLIN 2 G/1
2 INJECTION, POWDER, FOR SOLUTION INTRAMUSCULAR; INTRAVENOUS
Status: COMPLETED | OUTPATIENT
Start: 2025-01-07 | End: 2025-01-07

## 2025-01-07 RX ORDER — METOCLOPRAMIDE HYDROCHLORIDE 5 MG/ML
10 INJECTION INTRAMUSCULAR; INTRAVENOUS ONCE
Status: COMPLETED | OUTPATIENT
Start: 2025-01-07 | End: 2025-01-07

## 2025-01-07 RX ORDER — SODIUM CHLORIDE, SODIUM LACTATE, POTASSIUM CHLORIDE, CALCIUM CHLORIDE 600; 310; 30; 20 MG/100ML; MG/100ML; MG/100ML; MG/100ML
INJECTION, SOLUTION INTRAVENOUS CONTINUOUS PRN
Status: DISCONTINUED | OUTPATIENT
Start: 2025-01-07 | End: 2025-01-07

## 2025-01-07 RX ORDER — SODIUM CHLORIDE 9 MG/ML
INJECTION, SOLUTION INTRAVENOUS CONTINUOUS
Status: DISCONTINUED | OUTPATIENT
Start: 2025-01-07 | End: 2025-01-07 | Stop reason: HOSPADM

## 2025-01-07 RX ORDER — DIPHENHYDRAMINE HYDROCHLORIDE 50 MG/ML
12.5 INJECTION INTRAMUSCULAR; INTRAVENOUS
Status: DISCONTINUED | OUTPATIENT
Start: 2025-01-07 | End: 2025-01-07 | Stop reason: HOSPADM

## 2025-01-07 RX ORDER — PROPOFOL 10 MG/ML
VIAL (ML) INTRAVENOUS
Status: DISCONTINUED | OUTPATIENT
Start: 2025-01-07 | End: 2025-01-07

## 2025-01-07 RX ORDER — HYDROCODONE BITARTRATE AND ACETAMINOPHEN 10; 325 MG/1; MG/1
1 TABLET ORAL EVERY 4 HOURS PRN
Status: DISCONTINUED | OUTPATIENT
Start: 2025-01-07 | End: 2025-01-07 | Stop reason: HOSPADM

## 2025-01-07 RX ORDER — KETOROLAC TROMETHAMINE 30 MG/ML
15 INJECTION, SOLUTION INTRAMUSCULAR; INTRAVENOUS EVERY 8 HOURS PRN
Status: DISCONTINUED | OUTPATIENT
Start: 2025-01-07 | End: 2025-01-07 | Stop reason: HOSPADM

## 2025-01-07 RX ORDER — CEFAZOLIN 2 G/1
2 INJECTION, POWDER, FOR SOLUTION INTRAMUSCULAR; INTRAVENOUS
Status: DISCONTINUED | OUTPATIENT
Start: 2025-01-07 | End: 2025-01-07 | Stop reason: HOSPADM

## 2025-01-07 RX ORDER — LIDOCAINE HYDROCHLORIDE 10 MG/ML
INJECTION, SOLUTION EPIDURAL; INFILTRATION; INTRACAUDAL; PERINEURAL
Status: DISCONTINUED | OUTPATIENT
Start: 2025-01-07 | End: 2025-01-07

## 2025-01-07 RX ORDER — IBUPROFEN 800 MG/1
800 TABLET ORAL 3 TIMES DAILY PRN
Qty: 30 TABLET | Refills: 0 | Status: SHIPPED | OUTPATIENT
Start: 2025-01-07

## 2025-01-07 RX ORDER — CYCLOBENZAPRINE HCL 10 MG
10 TABLET ORAL 3 TIMES DAILY PRN
Qty: 30 TABLET | Refills: 0 | Status: SHIPPED | OUTPATIENT
Start: 2025-01-07 | End: 2025-01-17

## 2025-01-07 RX ORDER — OXYCODONE AND ACETAMINOPHEN 5; 325 MG/1; MG/1
1 TABLET ORAL
Status: DISCONTINUED | OUTPATIENT
Start: 2025-01-07 | End: 2025-01-07 | Stop reason: HOSPADM

## 2025-01-07 RX ORDER — HYDROCODONE BITARTRATE AND ACETAMINOPHEN 5; 325 MG/1; MG/1
1 TABLET ORAL EVERY 4 HOURS PRN
Status: DISCONTINUED | OUTPATIENT
Start: 2025-01-07 | End: 2025-01-07 | Stop reason: HOSPADM

## 2025-01-07 RX ORDER — BUPIVACAINE HYDROCHLORIDE 2.5 MG/ML
INJECTION, SOLUTION EPIDURAL; INFILTRATION; INTRACAUDAL
Status: DISCONTINUED | OUTPATIENT
Start: 2025-01-07 | End: 2025-01-07 | Stop reason: HOSPADM

## 2025-01-07 RX ORDER — FENTANYL CITRATE 50 UG/ML
INJECTION, SOLUTION INTRAMUSCULAR; INTRAVENOUS
Status: DISCONTINUED | OUTPATIENT
Start: 2025-01-07 | End: 2025-01-07

## 2025-01-07 RX ORDER — ONDANSETRON HYDROCHLORIDE 2 MG/ML
4 INJECTION, SOLUTION INTRAVENOUS EVERY 12 HOURS PRN
Status: DISCONTINUED | OUTPATIENT
Start: 2025-01-07 | End: 2025-01-07 | Stop reason: HOSPADM

## 2025-01-07 RX ADMIN — HYDROMORPHONE HYDROCHLORIDE 0.2 MG: 1 INJECTION, SOLUTION INTRAMUSCULAR; INTRAVENOUS; SUBCUTANEOUS at 03:01

## 2025-01-07 RX ADMIN — PROPOFOL 130 MG: 10 INJECTION, EMULSION INTRAVENOUS at 10:01

## 2025-01-07 RX ADMIN — FENTANYL CITRATE 100 MCG: 50 INJECTION, SOLUTION INTRAMUSCULAR; INTRAVENOUS at 10:01

## 2025-01-07 RX ADMIN — ROCURONIUM BROMIDE 10 MG: 10 SOLUTION INTRAVENOUS at 11:01

## 2025-01-07 RX ADMIN — SODIUM CHLORIDE, SODIUM LACTATE, POTASSIUM CHLORIDE, AND CALCIUM CHLORIDE: 600; 310; 30; 20 INJECTION, SOLUTION INTRAVENOUS at 10:01

## 2025-01-07 RX ADMIN — ROCURONIUM BROMIDE 10 MG: 10 SOLUTION INTRAVENOUS at 12:01

## 2025-01-07 RX ADMIN — DEXAMETHASONE SODIUM PHOSPHATE 4 MG: 4 INJECTION, SOLUTION INTRA-ARTICULAR; INTRALESIONAL; INTRAMUSCULAR; INTRAVENOUS; SOFT TISSUE at 01:01

## 2025-01-07 RX ADMIN — SODIUM CHLORIDE, SODIUM LACTATE, POTASSIUM CHLORIDE, AND CALCIUM CHLORIDE: 600; 310; 30; 20 INJECTION, SOLUTION INTRAVENOUS at 01:01

## 2025-01-07 RX ADMIN — SUCCINYLCHOLINE CHLORIDE 140 MG: 20 INJECTION, SOLUTION INTRAMUSCULAR; INTRAVENOUS; PARENTERAL at 10:01

## 2025-01-07 RX ADMIN — HYDROMORPHONE HYDROCHLORIDE 0.2 MG: 1 INJECTION, SOLUTION INTRAMUSCULAR; INTRAVENOUS; SUBCUTANEOUS at 02:01

## 2025-01-07 RX ADMIN — METOCLOPRAMIDE 10 MG: 5 INJECTION, SOLUTION INTRAMUSCULAR; INTRAVENOUS at 03:01

## 2025-01-07 RX ADMIN — ROCURONIUM BROMIDE 25 MG: 10 SOLUTION INTRAVENOUS at 10:01

## 2025-01-07 RX ADMIN — ONDANSETRON 4 MG: 2 INJECTION INTRAMUSCULAR; INTRAVENOUS at 01:01

## 2025-01-07 RX ADMIN — MIDAZOLAM HYDROCHLORIDE 2 MG: 1 INJECTION, SOLUTION INTRAMUSCULAR; INTRAVENOUS at 10:01

## 2025-01-07 RX ADMIN — SUGAMMADEX 200 MG: 100 INJECTION, SOLUTION INTRAVENOUS at 01:01

## 2025-01-07 RX ADMIN — ROCURONIUM BROMIDE 5 MG: 10 SOLUTION INTRAVENOUS at 10:01

## 2025-01-07 RX ADMIN — KETOROLAC TROMETHAMINE 15 MG: 30 INJECTION, SOLUTION INTRAMUSCULAR at 02:01

## 2025-01-07 RX ADMIN — CEFAZOLIN 2 G: 2 INJECTION, POWDER, FOR SOLUTION INTRAMUSCULAR; INTRAVENOUS at 11:01

## 2025-01-07 RX ADMIN — LIDOCAINE HYDROCHLORIDE 50 MG: 10 SOLUTION INTRAVENOUS at 10:01

## 2025-01-07 RX ADMIN — ONDANSETRON 4 MG: 2 INJECTION INTRAMUSCULAR; INTRAVENOUS at 02:01

## 2025-01-07 NOTE — H&P
History & Physical        Subjective  History of Present Illness:  Patient is a 44 y.o. male referred for right inguinal hernia.  He reports bulge in the right groin over the past month which has progressively become larger.  Notices it more when standing and at the end of the day.  Causes discomfort when poking out.     No chief complaint on file.        Review of patient's allergies indicates:  No Known Allergies     Current Medications          Current Outpatient Medications   Medication Sig Dispense Refill    ascorbic acid, vitamin C, (VITAMIN C) 500 MG tablet Take 500 mg by mouth once daily.        bisacodyL (DULCOLAX) 5 mg EC tablet Take 10 mg by mouth once.        docusate sodium (COLACE) 100 MG capsule Take 300 mg by mouth once daily.        finasteride (PROPECIA) 1 mg tablet Take 1 tablet (1 mg total) by mouth once daily. 90 tablet 3    linaCLOtide (LINZESS) 72 mcg Cap capsule Take 1 capsule (72 mcg total) by mouth before breakfast. 30 capsule 1    tirzepatide (MOUNJARO) 10 mg/0.5 mL PnIj Inject 10 mg into the skin every 7 days.        albuterol (VENTOLIN HFA) 90 mcg/actuation inhaler Inhale 2 puffs into the lungs every 6 (six) hours as needed for Wheezing. Rescue 18 g 0    LORazepam (ATIVAN) 1 MG tablet Take 1 tablet by mouth the night before appointment and 1 hour prior to appt 2 tablet 0    ondansetron (ZOFRAN-ODT) 4 MG TbDL Place 1-2 tablets (4 mg total) under tongue every 8 (eight) hours as needed for nausea 30 tablet 0      No current facility-administered medications for this visit.                 Past Medical History:   Diagnosis Date    Gout      Hypoglycemia      Nephrolithiasis              Past Surgical History:   Procedure Laterality Date    COLONOSCOPY N/A 11/6/2017     Procedure: COLONOSCOPY;  Surgeon: Jovani Colón III, MD;  Location: [facility] ENDO;  Service: Endoscopy;  Laterality: N/A;    COLONOSCOPY N/A 12/1/2020     Procedure: COLONOSCOPY;  Surgeon: Sima Reilly MD;  Location: [facility]  "ENDO;  Service: Endoscopy;  Laterality: N/A;    KNEE SURGERY                 Family History   Problem Relation Name Age of Onset    Diabetes Father        Diabetes Sister        Colon polyps Sister        Diabetes Maternal Grandmother        Colon cancer Maternal Grandmother        Colon polyps Maternal Grandmother        Glaucoma Maternal Grandmother        Diabetes Paternal Grandmother        Glaucoma Paternal Grandmother        Cancer Paternal Grandfather        Colon cancer Maternal Uncle        Colon polyps Mother        Heart disease Neg Hx          Social History   Social History            Tobacco Use    Smoking status: Never       Passive exposure: Never    Smokeless tobacco: Never   Substance Use Topics    Alcohol use: No    Drug use: No            Review of Systems:  Review of Systems   Constitutional:  Negative for activity change and unexpected weight change.   HENT:  Negative for hearing loss, rhinorrhea and trouble swallowing.    Eyes:  Negative for discharge and visual disturbance.   Respiratory:  Negative for chest tightness and wheezing.    Cardiovascular:  Negative for chest pain and palpitations.   Gastrointestinal:  Negative for blood in stool, constipation, diarrhea and vomiting.   Endocrine: Negative for polydipsia and polyuria.   Genitourinary:  Negative for difficulty urinating, hematuria and urgency.   Musculoskeletal:  Negative for arthralgias, joint swelling and neck pain.   Neurological:  Negative for weakness and headaches.   Psychiatric/Behavioral:  Negative for confusion and dysphoric mood.                Objective  Vital Signs (Most Recent)  Temp: 97.3 °F (36.3 °C) (12/04/24 1216)  Pulse: 62 (12/04/24 1216)  BP: 112/68 (12/04/24 1216)  5' 10" (1.778 m)  77.1 kg (169 lb 15.6 oz)      Physical Exam:  Physical Exam  Vitals reviewed.   Constitutional:       Appearance: He is well-developed.   HENT:      Head: Normocephalic and atraumatic.   Cardiovascular:      Rate and Rhythm: Normal " rate.   Pulmonary:      Effort: Pulmonary effort is normal.   Abdominal:      General: There is no distension.      Palpations: Abdomen is soft.      Tenderness: There is no abdominal tenderness.      Hernia: A hernia (right inguinal hernia reducible, mildly tender to palpation) is present.   Musculoskeletal:      Cervical back: Normal range of motion.   Skin:     General: Skin is warm and dry.   Neurological:      Mental Status: He is alert and oriented to person, place, and time.                  Assessment and Plan  44-year-old male with right inguinal hernia     PLAN:     Robotic right explore left inguinal hernia repair with mesh (we will coordinate with surgery for vasectomy with )  Preop:  CBC, CMP, EKG  Discussed risks and benefits of inguinal hernia repair including but not limited to:  Pain, bleeding, and infection, injury to spermatic cord, testicular atrophy or loss, nerve pain which may be long lasting, recurrence, need for further surgery

## 2025-01-07 NOTE — OP NOTE
[facility] - Surgery ([facility])  Surgery Department  Operative Note    SUMMARY     Date of Procedure: 1/7/2025     Procedure:   Robotic bilateral inguinal hernia repair    Surgeons and Role:       * Shekhar Orta MD - Primary    Assistant: NATHAN Nicolas    Pre-Operative Diagnosis:  Right inguinal hernia    Post-Operative Diagnosis:   Bilateral inguinal hernias    Anesthesia: General    Operative Findings (including complications, if any): Robotic bilateral inguinal hernia repair    Description of Technical Procedures:   Robotic bilateral inguinal hernia repair    Significant Surgical Tasks Conducted by the Assistant(s), if Applicable:  Bilateral indirect inguinal hernias    Estimated Blood Loss (EBL): 20cc            Implants:   Implant Name Type Inv. Item Serial No.  Lot No. LRB No. Used Action   MESH PROGRIP LAP [id number] LEFT - [id number]  MESH PROGRIP LAP [id number] LEFT  [employer] [id number] Left 1 Implanted   MESH PROGRIP LAP [id number] RIGHT - [id number]  MESH PROGRIP LAP [id number] RIGHT  [employer] [id number] Right 1 Implanted       Specimens:   Specimen (24h ago, onward)       Start     Ordered    01/07/25 1118  Specimen to Pathology, Surgery Urology  Once        Comments: Pre-op Diagnosis: Vasectomy evaluation [Z30.09]Procedure(s):VASECTOMYDV5 ROBOTIC REPAIR, HERNIA, INGUINAL Number of specimens: 2Name of specimens: 1. Left Vas-perm 2. Right Vas-perm     References:    Click here for ordering Quick Tip   Question Answer Comment   Procedure Type: Urology    Specimen Class: Routine/Screening    Release to patient Immediate        01/07/25 1127                            Condition: Good    Disposition: PACU - hemodynamically stable.    Procedure in detail:   The patient was brought to the OR and underwent general anesthesia.  He was prepped and draped in usual sterile fashion.  An umbilical incision. Fascia was grasped and elevated. A veres needle was placed and abdomen insufflated to 15mmHg. An 8mm robotic  port was placed  using the optiview technique through the umbilical hernia and no apparent injuries were noted. Two 8mm robotic ports were placed in the right and left mid abdomen. The robot was docked. The patient was then placed in a steep trendelenburg position was taken, and visualized the inguinal areas.      The right inguinal canal was inspected and an indirect inguinal hernia was noted. The mobilization of the peritoneum was then commenced from the most lateral-inferior aspect and brought up well above the line of anterior superior iliac supine. The dissection was continued medially, identifying epigastric vessels, umbilical vein remnant , while visualizing the defect. The dissection was continued all the way medially to the Jordon's ligament, and visualized the pubic bone as well. The blunt dissection and sharp dissection was done to create a peritoneal flap. Then a program mesh right side was brought into the port along with to a strata fix. The mesh was placed in the appropriate position. The mesh sat nicely covering inguinal defect. The mesh was then fixated onto the internal oblique and transversalis muscle and pubic tubercle. The peritoneal flap was closed with 2-0 statafix suture. The sutures were removed completely.     The left inguinal canal was inspected and an indirect inguinal hernia was noted. The mobilization of the peritoneum was then commenced from the most lateral-inferior aspect and brought up well above the line of anterior superior iliac supine. The dissection was continued medially, identifying epigastric vessels, umbilical vein remnant , while visualizing the defect. The dissection was continued all the way medially to the Jordon's ligament, and visualized the pubic bone as well. The blunt dissection and sharp dissection was done to create a peritoneal flap. Then a ProGrip mesh left side was brought into the port along with to a strata fix. The mesh was placed in the appropriate  position. The mesh sat nicely covering inguinal defect. The mesh was then fixated onto the internal oblique and transversalis muscle and pubic tubercle. The peritoneal flap was closed with 2-0 statafix suture. The sutures were removed completely.     Local anesthetic was injected.  All incisions were then closed with 4-0 Monocryl.  Dermabond was applied.  The patient tolerated procedure in stable and satisfactory condition was transferred to recovery postoperatively.

## 2025-01-07 NOTE — ANESTHESIA PROCEDURE NOTES
Intubation    Date/Time: 1/7/2025 10:54 AM    Performed by: Paulino Minaya CRNA  Authorized by: Armando Viveros MD    Intubation:     Induction:  Intravenous    Intubated:  Postinduction    Mask Ventilation:  Easy mask    Attempts:  1    Attempted By:  CRNA    Method of Intubation:  Direct    Blade:  Jaja 3    Laryngeal View Grade: Grade IIA - cords partially seen      Difficult Airway Encountered?: No      Complications:  None    Airway Device:  Oral endotracheal tube    Airway Device Size:  7.5    Style/Cuff Inflation:  Cuffed (inflated to minimal occlusive pressure)    Inflation Amount (mL):  6    Tube secured:  23    Secured at:  The lips    Placement Verified By:  Capnometry    Complicating Factors:  None    Findings Post-Intubation:  BS equal bilateral and atraumatic/condition of teeth unchanged

## 2025-01-07 NOTE — OP NOTE
Date of Procedure: 01/07/2025    PREOPERATIVE DIAGNOSIS:  Undesired fertility    POSTOPERATIVE DIAGNOSIS:  Same    PROCEDURES:  Vasectomy    SURGEON:  Andrew Somers M.D.    ASSISTANTS: None    SPECIMEN:  Right and left vas deferens    ANESTHESIA:  General endotracheal.    BLOOD LOSS:  Minimal    FINDINGS:  Complete ligation of right and left vas deferens    PROCEDURE IN DETAIL:  Patient was brought into the operating suite was placed in supine position.  Patient was sterilely prepped and draped, the left vas was isolated and the skin was opened sharply, local was applied into the vas. Ring forceps were used to isolate the vas and bring it out of the wound. The sheath overlying the vas was dissected away, and a length of vas exposed. Approximately a 1 cm segment of vas was excised, and each end was bovie'd intraluminally and then clipped with sterile metal clips.  Hemostasis was meticulously maintained and we proceeded to the right side.  Local was then applied into the right vas. Ring forceps were used to isolate the vas and bring it out of the wound. The sheath overlying the vas was dissected away, and a length of vas exposed.  Approximately a 1 cm segment of vas was excised, and each end was bovie'd intraluminally and then clipped with sterile metal clips.  Hemostasis was meticulously maintained and we proceeded with closure.  After hemostasis was again assured at the level of the scrotal skin, I closed using a 4-0 chromic.  The left and right vas segments were sent to pathology for examination.  The case was then handed over to General surgery to proceed with robotic hernia repair.  Patient will follow up in 6 weeks for semen analysis.    COMPLICATIONS:  None

## 2025-01-07 NOTE — DISCHARGE SUMMARY
[facility] - Surgery (Hospital)  Discharge Note  Short Stay    Procedure(s) (LRB):  VASECTOMY (Bilateral)  DV5 ROBOTIC REPAIR, HERNIA, INGUINAL (Bilateral)      OUTCOME: Patient tolerated treatment/procedure well without complication and is now ready for discharge.    DISPOSITION: Home or Self Care    FINAL DIAGNOSIS:  Non-recurrent bilateral inguinal hernia without obstruction or gangrene    FOLLOWUP: In clinic    DISCHARGE INSTRUCTIONS:    Discharge Procedure Orders   Diet general     Lifting restrictions   Order Comments: No heavy lifting greater than 10 lb or strenuous activity x6 weeks     Call MD for:  temperature >100.4     Call MD for:  persistent nausea and vomiting     Call MD for:  severe uncontrolled pain     Call MD for:  difficulty breathing, headache or visual disturbances     Call MD for:  redness, tenderness, or signs of infection (pain, swelling, redness, odor or green/yellow discharge around incision site)     Call MD for:  hives     Call MD for:  persistent dizziness or light-headedness     Call MD for:  extreme fatigue     Remove dressing in 48 hours     Wound care routine (specify)   Order Comments: Wound care routine:  Apply bacitracin ointment twice daily to scrotal incision     No dressing needed     Activity as tolerated     Shower on day dressing removed (No bath)   Order Comments: Okay to shower in 48 hours        TIME SPENT ON DISCHARGE: 30 minutes

## 2025-01-07 NOTE — ANESTHESIA PREPROCEDURE EVALUATION
01/07/2025  Luiz Ybarra is a 45 y.o., male    Patient Active Problem List   Diagnosis    LLQ abdominal pain    Nephrolithiasis    Flank pain    History of diverticulitis of colon    JERRELL on CPAP    Class 1 obesity due to excess calories with serious comorbidity and body mass index (BMI) of 32.0 to 32.9 in adult    Constipation    Hair thinning    Vasectomy evaluation     Past Medical History:   Diagnosis Date    Gout     Hypoglycemia     Nephrolithiasis     PONV (postoperative nausea and vomiting)     Vasectomy evaluation 12/09/2024     Past Surgical History:   Procedure Laterality Date    COLONOSCOPY N/A 11/6/2017    Procedure: COLONOSCOPY;  Surgeon: Jovani Colón III, MD;  Location: [facility] ENDO;  Service: Endoscopy;  Laterality: N/A;    COLONOSCOPY N/A 12/1/2020    Procedure: COLONOSCOPY;  Surgeon: Sima Reilly MD;  Location: [facility] ENDO;  Service: Endoscopy;  Laterality: N/A;    KNEE SURGERY         Chemistry        Component Value Date/Time     12/31/2024 1024    K 3.9 12/31/2024 1024     12/31/2024 1024    CO2 27 12/31/2024 1024    BUN 17 12/31/2024 1024    CREATININE 0.8 12/31/2024 1024    GLU 76 12/31/2024 1024        Component Value Date/Time    CALCIUM 9.2 12/31/2024 1024    ALKPHOS 56 12/31/2024 1024    AST 41 (H) 12/31/2024 1024    ALT 41 12/31/2024 1024    BILITOT 0.9 12/31/2024 1024    ESTGFRAFRICA >60.0 03/07/2022 0908    EGFRNONAA >60.0 03/07/2022 0908        Lab Results   Component Value Date    WBC 5.83 12/31/2024    HGB 15.5 12/31/2024    HCT 45.0 12/31/2024    MCV 92 12/31/2024     12/31/2024     ECHO (2023):  Summary    The left ventricle is normal in size with normal systolic function.  Normal left ventricular diastolic function.  The estimated PA systolic pressure is 21 mmHg.  Normal right ventricular size with normal right ventricular systolic  function.  Normal central venous pressure (3 mmHg).  The estimated ejection fraction is 60%.  Mild tricuspid regurgitation.    Pre-op Assessment    I have reviewed the Patient Summary Reports.    I have reviewed the NPO Status.   I have reviewed the Medications.     Review of Systems  Anesthesia Hx:   History of prior surgery of interest to airway management or planning:           Personal Hx of Anesthesia complications, Post-Operative Nausea/Vomiting, with every anesthetic, treatment not known                    Social:  Non-Smoker       Hematology/Oncology:  Hematology Normal   Oncology Normal                                   Cardiovascular:  Cardiovascular Normal                  ECG has been reviewed. Normal sinus rhythm   Normal ECG   No previous ECGs available   Confirmed by Annie Jernigan ([id number]) on 12/31/2024 11:14:50 AM                              Pulmonary:        Sleep Apnea, CPAP                Renal/:   renal calculi  Hx of nephrolithiasis              Musculoskeletal:  Musculoskeletal Normal                Neurological:  Neurology Normal                                      Endocrine:  Endocrine Normal    BMI 24      Denies Obesity / BMI > 30      Physical Exam  General: Well nourished, Cooperative, Alert and Oriented    Airway:  Mallampati: I   Mouth Opening: Normal  TM Distance: Normal  Tongue: Normal  Neck ROM: Normal ROM    Dental:  Intact  Patient denies any currently loose or chipped teeth; Patient denies any removable dental appliances        Anesthesia Plan  Type of Anesthesia, risks & benefits discussed:    Anesthesia Type: Gen ETT, Gen Supraglottic Airway  Intra-op Monitoring Plan: Standard ASA Monitors  Post Op Pain Control Plan: multimodal analgesia and IV/PO Opioids PRN  Induction:  IV  Airway Plan: Direct, Post-Induction  Informed Consent: Informed consent signed with the Patient and all parties understand the risks and agree with anesthesia plan.  All questions answered.   ASA  Score: 2  Day of Surgery Review of History & Physical: H&P Update referred to the surgeon/provider.    Ready For Surgery From Anesthesia Perspective.     .

## 2025-01-07 NOTE — TELEPHONE ENCOUNTER
----- Message from Andrew Somers MD sent at 1/7/2025 11:28 AM CST -----  6 weeks to look at his specimen

## 2025-01-07 NOTE — TRANSFER OF CARE
"Anesthesia Transfer of Care Note    Patient: Luiz Ybarra    Procedure(s) Performed: Procedure(s) (LRB):  VASECTOMY (Bilateral)  DV5 ROBOTIC REPAIR, HERNIA, INGUINAL (Bilateral)    Patient location: PACU    Anesthesia Type: general    Transport from OR: Transported from OR on room air with adequate spontaneous ventilation    Post pain: adequate analgesia    Post assessment: no apparent anesthetic complications and tolerated procedure well    Post vital signs: stable    Level of consciousness: responds to stimulation    Nausea/Vomiting: no nausea/vomiting    Complications: none    Transfer of care protocol was followed      Last vitals: Visit Vitals  BP (!) 104/58   Pulse 60   Temp 36.4 °C (97.5 °F) (Temporal)   Resp 16   Ht 5' 10" (1.778 m)   Wt 76.4 kg (168 lb 5.1 oz)   SpO2 99%   BMI 24.15 kg/m²     "

## 2025-01-08 LAB
FINAL PATHOLOGIC DIAGNOSIS: NORMAL
GROSS: NORMAL
Lab: NORMAL

## 2025-01-10 NOTE — ANESTHESIA POSTPROCEDURE EVALUATION
Anesthesia Post Evaluation    Patient: Luiz Ybarra    Procedure(s) Performed: Procedure(s) (LRB):  VASECTOMY (Bilateral)  DV5 ROBOTIC REPAIR, HERNIA, INGUINAL (Bilateral)    Final Anesthesia Type: general      Patient location during evaluation: PACU  Patient participation: Yes- Able to Participate  Level of consciousness: awake and alert and oriented  Post-procedure vital signs: reviewed and stable  Pain management: adequate  Airway patency: patent  JERRELL mitigation strategies: Verification of full reversal of neuromuscular block  PONV status at discharge: No PONV  Anesthetic complications: no      Cardiovascular status: blood pressure returned to baseline and hemodynamically stable  Respiratory status: unassisted  Hydration status: euvolemic  Follow-up not needed.              Vitals Value Taken Time   /67 01/07/25 1603   Temp 36.3 °C (97.3 °F) 01/07/25 1600   Pulse 72 01/07/25 1609   Resp 16 01/07/25 1608   SpO2 96 % 01/07/25 1609   Vitals shown include unfiled device data.      Event Time   Out of Recovery 16:10:09         Pain/Bernie Score: No data recorded

## 2025-01-21 ENCOUNTER — TELEPHONE (OUTPATIENT)
Dept: SURGERY | Facility: CLINIC | Age: 45
End: 2025-01-21
Payer: COMMERCIAL

## 2025-01-21 ENCOUNTER — PATIENT MESSAGE (OUTPATIENT)
Dept: SURGERY | Facility: CLINIC | Age: 45
End: 2025-01-21
Payer: COMMERCIAL

## 2025-01-21 NOTE — TELEPHONE ENCOUNTER
Contacting pt to make aware that clinic is closed tomorrow. No answer--left message. Sent [facility] message as well about new appt time, date and location.

## 2025-01-29 ENCOUNTER — OFFICE VISIT (OUTPATIENT)
Dept: SURGERY | Facility: CLINIC | Age: 45
End: 2025-01-29
Payer: COMMERCIAL

## 2025-01-29 VITALS
SYSTOLIC BLOOD PRESSURE: 110 MMHG | HEIGHT: 70 IN | DIASTOLIC BLOOD PRESSURE: 71 MMHG | HEART RATE: 62 BPM | BODY MASS INDEX: 23.64 KG/M2 | WEIGHT: 165.13 LBS

## 2025-01-29 DIAGNOSIS — K40.20 NON-RECURRENT BILATERAL INGUINAL HERNIA WITHOUT OBSTRUCTION OR GANGRENE: Primary | ICD-10-CM

## 2025-01-29 PROCEDURE — 99024 POSTOP FOLLOW-UP VISIT: CPT | Mod: S$GLB,,,

## 2025-01-29 PROCEDURE — 1160F RVW MEDS BY RX/DR IN RCRD: CPT | Mod: CPTII,S$GLB,,

## 2025-01-29 PROCEDURE — 99999 PR PBB SHADOW E&M-EST. PATIENT-LVL III: CPT | Mod: PBBFAC,,,

## 2025-01-29 PROCEDURE — 3074F SYST BP LT 130 MM HG: CPT | Mod: CPTII,S$GLB,,

## 2025-01-29 PROCEDURE — 3078F DIAST BP <80 MM HG: CPT | Mod: CPTII,S$GLB,,

## 2025-01-29 PROCEDURE — 1159F MED LIST DOCD IN RCRD: CPT | Mod: CPTII,S$GLB,,

## 2025-01-29 NOTE — PROGRESS NOTES
History & Physical    Subjective     History of Present Illness:  Patient is a 45 y.o. male referred for right inguinal hernia.  He reports bulge in the right groin over the past month which has progressively become larger.  Notices it more when standing and at the end of the day.  Causes discomfort when poking out.    Interval history:  Since the last clinic visit, the patient underwent robotic bilateral inguinal hernia repair.  He is doing well today with no complaints.  He is tolerating regular diet and having normal bowel movements.  He is urinating without issue.  He denies any fevers, chills, nausea, or vomiting.    Chief Complaint   Patient presents with    Post-op Evaluation       Review of patient's allergies indicates:  No Known Allergies    Current Outpatient Medications   Medication Sig Dispense Refill    ascorbic acid, vitamin C, (VITAMIN C) 500 MG tablet Take 500 mg by mouth once daily.      bisacodyL (DULCOLAX) 5 mg EC tablet Take 10 mg by mouth once.      docusate sodium (COLACE) 100 MG capsule Take 300 mg by mouth once daily.      finasteride (PROPECIA) 1 mg tablet Take 1 tablet (1 mg total) by mouth once daily. 90 tablet 3    linaCLOtide (LINZESS) 72 mcg Cap capsule Take 1 capsule (72 mcg total) by mouth before breakfast. 30 capsule 1    LORazepam (ATIVAN) 1 MG tablet Take 1 tablet by mouth the night before appointment and 1 hour prior to appt 2 tablet 0    ondansetron (ZOFRAN-ODT) 4 MG TbDL Place 1-2 tablets (4 mg total) under tongue every 8 (eight) hours as needed for nausea 30 tablet 0    tirzepatide (MOUNJARO) 10 mg/0.5 mL PnIj Inject 10 mg into the skin every 7 days.      albuterol (VENTOLIN HFA) 90 mcg/actuation inhaler Inhale 2 puffs into the lungs every 6 (six) hours as needed for Wheezing. Rescue 18 g 0     No current facility-administered medications for this visit.       Past Medical History:   Diagnosis Date    Gout     Hypoglycemia     Nephrolithiasis     PONV (postoperative nausea and  vomiting)     Vasectomy evaluation 12/09/2024     Past Surgical History:   Procedure Laterality Date    COLONOSCOPY N/A 11/6/2017    Procedure: COLONOSCOPY;  Surgeon: Jovani Colón III, MD;  Location: [facility] ENDO;  Service: Endoscopy;  Laterality: N/A;    COLONOSCOPY N/A 12/1/2020    Procedure: COLONOSCOPY;  Surgeon: Sima Reilly MD;  Location: [facility] ENDO;  Service: Endoscopy;  Laterality: N/A;    DV5 ROBOTIC REPAIR, HERNIA, INGUINAL Bilateral 1/7/2025    Procedure: DV5 ROBOTIC REPAIR, HERNIA, INGUINAL;  Surgeon: Shekhar Orta MD;  Location: [facility] OR;  Service: General;  Laterality: Bilateral;  Right explore left    KNEE SURGERY      VASECTOMY Bilateral 1/7/2025    Procedure: VASECTOMY;  Surgeon: Andrew Somers MD;  Location: [facility] OR;  Service: Urology;  Laterality: Bilateral;     Family History   Problem Relation Name Age of Onset    Diabetes Father      Diabetes Sister      Colon polyps Sister      Diabetes Maternal Grandmother      Colon cancer Maternal Grandmother      Colon polyps Maternal Grandmother      Glaucoma Maternal Grandmother      Diabetes Paternal Grandmother      Glaucoma Paternal Grandmother      Cancer Paternal Grandfather      Colon cancer Maternal Uncle      Colon polyps Mother      Heart disease Neg Hx       Social History     Tobacco Use    Smoking status: Never     Passive exposure: Never    Smokeless tobacco: Never   Substance Use Topics    Alcohol use: No    Drug use: No        Review of Systems:  Review of Systems   Constitutional:  Negative for activity change and unexpected weight change.   HENT:  Negative for hearing loss, rhinorrhea and trouble swallowing.    Eyes:  Negative for discharge and visual disturbance.   Respiratory:  Negative for chest tightness and wheezing.    Cardiovascular:  Negative for chest pain and palpitations.   Gastrointestinal:  Negative for blood in stool, constipation, diarrhea and vomiting.   Endocrine: Negative for polydipsia and polyuria.  "  Genitourinary:  Negative for difficulty urinating, hematuria and urgency.   Musculoskeletal:  Negative for arthralgias, joint swelling and neck pain.   Neurological:  Negative for weakness and headaches.   Psychiatric/Behavioral:  Negative for confusion and dysphoric mood.           Objective     Vital Signs (Most Recent)  Pulse: 62 (01/29/25 1112)  BP: 110/71 (01/29/25 1112)  5' 10" (1.778 m)  74.9 kg (165 lb 2 oz)     Physical Exam:  Physical Exam  Vitals reviewed.   Constitutional:       General: He is not in acute distress.     Appearance: He is well-developed. He is not toxic-appearing.   HENT:      Head: Normocephalic and atraumatic.      Right Ear: External ear normal.      Left Ear: External ear normal.      Nose: Nose normal.      Mouth/Throat:      Mouth: Mucous membranes are moist.   Eyes:      Extraocular Movements: Extraocular movements intact.      Conjunctiva/sclera: Conjunctivae normal.   Cardiovascular:      Rate and Rhythm: Normal rate.   Pulmonary:      Effort: Pulmonary effort is normal. No respiratory distress.   Abdominal:      Comments: Abdomen is soft, nontender, nondistended.  Incisions are clean dry and intact, healing well without signs of infection.   Musculoskeletal:      Cervical back: Normal range of motion.   Skin:     General: Skin is warm and dry.   Neurological:      Mental Status: He is alert and oriented to person, place, and time.   Psychiatric:         Behavior: Behavior normal.            Assessment and Plan     44-year-old male with right inguinal hernia now status post robotic repair of bilateral inguinal hernias who is recovering as expected.    - continue light duty for a total of 6 weeks postoperatively to reduce the risk of hernia recurrence  -no further postoperative restrictions or interventions otherwise  -return to clinic 1 month or as needed      Cora Yeh PA-C  [facility]      "

## 2025-02-19 ENCOUNTER — TELEPHONE (OUTPATIENT)
Dept: UROLOGY | Facility: CLINIC | Age: 45
End: 2025-02-19
Payer: COMMERCIAL

## 2025-02-21 ENCOUNTER — TELEPHONE (OUTPATIENT)
Dept: UROLOGY | Facility: CLINIC | Age: 45
End: 2025-02-21
Payer: COMMERCIAL

## 2025-02-21 ENCOUNTER — CLINICAL SUPPORT (OUTPATIENT)
Dept: UROLOGY | Facility: CLINIC | Age: 45
End: 2025-02-21
Payer: COMMERCIAL

## 2025-02-21 DIAGNOSIS — Z30.09 VASECTOMY EVALUATION: Primary | ICD-10-CM

## 2025-02-21 NOTE — PROGRESS NOTES
Called patient and let him know that his sample was clear per  and to keep his appointment that is scheduled.

## 2025-02-24 ENCOUNTER — OFFICE VISIT (OUTPATIENT)
Dept: SURGERY | Facility: CLINIC | Age: 45
End: 2025-02-24
Payer: COMMERCIAL

## 2025-02-24 ENCOUNTER — LAB VISIT (OUTPATIENT)
Dept: LAB | Facility: HOSPITAL | Age: 45
End: 2025-02-24
Attending: FAMILY MEDICINE
Payer: COMMERCIAL

## 2025-02-24 VITALS
WEIGHT: 173.5 LBS | BODY MASS INDEX: 24.89 KG/M2 | HEART RATE: 63 BPM | SYSTOLIC BLOOD PRESSURE: 115 MMHG | DIASTOLIC BLOOD PRESSURE: 69 MMHG

## 2025-02-24 DIAGNOSIS — Z00.00 ROUTINE GENERAL MEDICAL EXAMINATION AT A HEALTH CARE FACILITY: ICD-10-CM

## 2025-02-24 DIAGNOSIS — K40.20 NON-RECURRENT BILATERAL INGUINAL HERNIA WITHOUT OBSTRUCTION OR GANGRENE: Primary | ICD-10-CM

## 2025-02-24 DIAGNOSIS — Z12.5 PROSTATE CANCER SCREENING: ICD-10-CM

## 2025-02-24 LAB
ALBUMIN SERPL BCP-MCNC: 3.9 G/DL (ref 3.5–5.2)
ALP SERPL-CCNC: 54 U/L (ref 40–150)
ALT SERPL W/O P-5'-P-CCNC: 42 U/L (ref 10–44)
ANION GAP SERPL CALC-SCNC: 10 MMOL/L (ref 8–16)
AST SERPL-CCNC: 52 U/L (ref 10–40)
BASOPHILS # BLD AUTO: 0.04 K/UL (ref 0–0.2)
BASOPHILS NFR BLD: 0.9 % (ref 0–1.9)
BILIRUB SERPL-MCNC: 0.6 MG/DL (ref 0.1–1)
BUN SERPL-MCNC: 17 MG/DL (ref 6–20)
CALCIUM SERPL-MCNC: 9.1 MG/DL (ref 8.7–10.5)
CHLORIDE SERPL-SCNC: 107 MMOL/L (ref 95–110)
CHOLEST SERPL-MCNC: 188 MG/DL (ref 120–199)
CHOLEST/HDLC SERPL: 2.6 {RATIO} (ref 2–5)
CO2 SERPL-SCNC: 25 MMOL/L (ref 23–29)
CREAT SERPL-MCNC: 0.7 MG/DL (ref 0.5–1.4)
DIFFERENTIAL METHOD BLD: ABNORMAL
EOSINOPHIL # BLD AUTO: 0.1 K/UL (ref 0–0.5)
EOSINOPHIL NFR BLD: 1.1 % (ref 0–8)
ERYTHROCYTE [DISTWIDTH] IN BLOOD BY AUTOMATED COUNT: 14 % (ref 11.5–14.5)
EST. GFR  (NO RACE VARIABLE): >60 ML/MIN/1.73 M^2
ESTIMATED AVG GLUCOSE: 80 MG/DL (ref 68–131)
GLUCOSE SERPL-MCNC: 81 MG/DL (ref 70–110)
HBA1C MFR BLD: 4.4 % (ref 4–5.6)
HCT VFR BLD AUTO: 44 % (ref 40–54)
HDLC SERPL-MCNC: 73 MG/DL (ref 40–75)
HDLC SERPL: 38.8 % (ref 20–50)
HGB BLD-MCNC: 14.9 G/DL (ref 14–18)
IMM GRANULOCYTES # BLD AUTO: 0.02 K/UL (ref 0–0.04)
IMM GRANULOCYTES NFR BLD AUTO: 0.4 % (ref 0–0.5)
LDLC SERPL CALC-MCNC: 105 MG/DL (ref 63–159)
LYMPHOCYTES # BLD AUTO: 1.3 K/UL (ref 1–4.8)
LYMPHOCYTES NFR BLD: 28.5 % (ref 18–48)
MCH RBC QN AUTO: 31.4 PG (ref 27–31)
MCHC RBC AUTO-ENTMCNC: 33.9 G/DL (ref 32–36)
MCV RBC AUTO: 93 FL (ref 82–98)
MONOCYTES # BLD AUTO: 0.5 K/UL (ref 0.3–1)
MONOCYTES NFR BLD: 10.5 % (ref 4–15)
NEUTROPHILS # BLD AUTO: 2.6 K/UL (ref 1.8–7.7)
NEUTROPHILS NFR BLD: 58.6 % (ref 38–73)
NONHDLC SERPL-MCNC: 115 MG/DL
NRBC BLD-RTO: 0 /100 WBC
PLATELET # BLD AUTO: 229 K/UL (ref 150–450)
PMV BLD AUTO: 10 FL (ref 9.2–12.9)
POTASSIUM SERPL-SCNC: 5 MMOL/L (ref 3.5–5.1)
PROT SERPL-MCNC: 6.5 G/DL (ref 6–8.4)
RBC # BLD AUTO: 4.74 M/UL (ref 4.6–6.2)
SODIUM SERPL-SCNC: 142 MMOL/L (ref 136–145)
TRIGL SERPL-MCNC: 50 MG/DL (ref 30–150)
TSH SERPL DL<=0.005 MIU/L-ACNC: 1.04 UIU/ML (ref 0.4–4)
URATE SERPL-MCNC: 6.7 MG/DL (ref 3.4–7)
WBC # BLD AUTO: 4.49 K/UL (ref 3.9–12.7)

## 2025-02-24 PROCEDURE — 36415 COLL VENOUS BLD VENIPUNCTURE: CPT | Mod: PN | Performed by: FAMILY MEDICINE

## 2025-02-24 PROCEDURE — 84153 ASSAY OF PSA TOTAL: CPT | Performed by: FAMILY MEDICINE

## 2025-02-24 PROCEDURE — 99999 PR PBB SHADOW E&M-EST. PATIENT-LVL III: CPT | Mod: PBBFAC,,, | Performed by: SURGERY

## 2025-02-24 PROCEDURE — 99024 POSTOP FOLLOW-UP VISIT: CPT | Mod: S$GLB,,, | Performed by: SURGERY

## 2025-02-24 PROCEDURE — 85025 COMPLETE CBC W/AUTO DIFF WBC: CPT | Performed by: FAMILY MEDICINE

## 2025-02-24 PROCEDURE — 84443 ASSAY THYROID STIM HORMONE: CPT | Performed by: FAMILY MEDICINE

## 2025-02-24 PROCEDURE — 83036 HEMOGLOBIN GLYCOSYLATED A1C: CPT | Performed by: FAMILY MEDICINE

## 2025-02-24 PROCEDURE — 1159F MED LIST DOCD IN RCRD: CPT | Mod: CPTII,S$GLB,, | Performed by: SURGERY

## 2025-02-24 PROCEDURE — 80053 COMPREHEN METABOLIC PANEL: CPT | Performed by: FAMILY MEDICINE

## 2025-02-24 PROCEDURE — 3074F SYST BP LT 130 MM HG: CPT | Mod: CPTII,S$GLB,, | Performed by: SURGERY

## 2025-02-24 PROCEDURE — 3078F DIAST BP <80 MM HG: CPT | Mod: CPTII,S$GLB,, | Performed by: SURGERY

## 2025-02-24 PROCEDURE — 80061 LIPID PANEL: CPT | Performed by: FAMILY MEDICINE

## 2025-02-24 PROCEDURE — 1160F RVW MEDS BY RX/DR IN RCRD: CPT | Mod: CPTII,S$GLB,, | Performed by: SURGERY

## 2025-02-24 PROCEDURE — 84550 ASSAY OF BLOOD/URIC ACID: CPT | Performed by: FAMILY MEDICINE

## 2025-02-24 RX ORDER — IBUPROFEN 800 MG/1
800 TABLET ORAL 3 TIMES DAILY
Qty: 30 TABLET | Refills: 0 | Status: SHIPPED | OUTPATIENT
Start: 2025-02-24

## 2025-02-24 RX ORDER — GABAPENTIN 100 MG/1
100 CAPSULE ORAL 3 TIMES DAILY
Qty: 90 CAPSULE | Refills: 2 | Status: SHIPPED | OUTPATIENT
Start: 2025-02-24 | End: 2025-05-25

## 2025-02-24 RX ORDER — METHYLPREDNISOLONE 4 MG/1
TABLET ORAL
Qty: 21 EACH | Refills: 0 | Status: SHIPPED | OUTPATIENT
Start: 2025-02-24 | End: 2025-03-17

## 2025-02-24 NOTE — PROGRESS NOTES
History & Physical    Subjective     History of Present Illness:  Patient is a 45 y.o. male status post robotic bilateral inguinal hernia repair 01/07/2025 presents for follow-up.  He is having intermittent burning pain near his left hip when changing position.  He has been increasing his exercise and back to running.  The discomfort is typically when going from sitting to standing or slight position change.      Initially referred for right inguinal hernia.  He reports bulge in the right groin over the past month which has progressively become larger.  Notices it more when standing and at the end of the day.  Causes discomfort when poking out.    Chief Complaint   Patient presents with    Post-op Evaluation       Review of patient's allergies indicates:  No Known Allergies    Current Outpatient Medications   Medication Sig Dispense Refill    ascorbic acid, vitamin C, (VITAMIN C) 500 MG tablet Take 500 mg by mouth once daily.      bisacodyL (DULCOLAX) 5 mg EC tablet Take 10 mg by mouth once.      docusate sodium (COLACE) 100 MG capsule Take 300 mg by mouth once daily.      finasteride (PROPECIA) 1 mg tablet Take 1 tablet (1 mg total) by mouth once daily. 90 tablet 3    linaCLOtide (LINZESS) 72 mcg Cap capsule Take 1 capsule (72 mcg total) by mouth before breakfast. 30 capsule 1    LORazepam (ATIVAN) 1 MG tablet Take 1 tablet by mouth the night before appointment and 1 hour prior to appt 2 tablet 0    ondansetron (ZOFRAN-ODT) 4 MG TbDL Place 1-2 tablets (4 mg total) under tongue every 8 (eight) hours as needed for nausea 30 tablet 0    tirzepatide (MOUNJARO) 10 mg/0.5 mL PnIj Inject 10 mg into the skin every 7 days.      albuterol (VENTOLIN HFA) 90 mcg/actuation inhaler Inhale 2 puffs into the lungs every 6 (six) hours as needed for Wheezing. Rescue 18 g 0    gabapentin (NEURONTIN) 100 MG capsule Take 1 capsule (100 mg total) by mouth 3 (three) times daily. 90 capsule 2    ibuprofen (ADVIL,MOTRIN) 800 MG tablet Take  1 tablet (800 mg total) by mouth 3 (three) times daily. 30 tablet 0    methylPREDNISolone (MEDROL DOSEPACK) 4 mg tablet use as directed 21 each 0     No current facility-administered medications for this visit.       Past Medical History:   Diagnosis Date    Gout     Hypoglycemia     Nephrolithiasis     PONV (postoperative nausea and vomiting)     Vasectomy evaluation 12/09/2024     Past Surgical History:   Procedure Laterality Date    COLONOSCOPY N/A 11/6/2017    Procedure: COLONOSCOPY;  Surgeon: Jovani Colón III, MD;  Location: [facility] ENDO;  Service: Endoscopy;  Laterality: N/A;    COLONOSCOPY N/A 12/1/2020    Procedure: COLONOSCOPY;  Surgeon: Sima Reilly MD;  Location: [facility] ENDO;  Service: Endoscopy;  Laterality: N/A;    DV5 ROBOTIC REPAIR, HERNIA, INGUINAL Bilateral 1/7/2025    Procedure: DV5 ROBOTIC REPAIR, HERNIA, INGUINAL;  Surgeon: Shekhar Orta MD;  Location: [facility] OR;  Service: General;  Laterality: Bilateral;  Right explore left    KNEE SURGERY      VASECTOMY Bilateral 1/7/2025    Procedure: VASECTOMY;  Surgeon: Andrew Somers MD;  Location: [facility] OR;  Service: Urology;  Laterality: Bilateral;     Family History   Problem Relation Name Age of Onset    Diabetes Father      Diabetes Sister      Colon polyps Sister      Diabetes Maternal Grandmother      Colon cancer Maternal Grandmother      Colon polyps Maternal Grandmother      Glaucoma Maternal Grandmother      Diabetes Paternal Grandmother      Glaucoma Paternal Grandmother      Cancer Paternal Grandfather      Colon cancer Maternal Uncle      Colon polyps Mother      Heart disease Neg Hx       Social History     Tobacco Use    Smoking status: Never     Passive exposure: Never    Smokeless tobacco: Never   Substance Use Topics    Alcohol use: No    Drug use: No        Review of Systems:  Review of Systems   Constitutional:  Negative for activity change and unexpected weight change.   HENT:  Negative for hearing loss, rhinorrhea and  trouble swallowing.    Eyes:  Negative for discharge and visual disturbance.   Respiratory:  Negative for chest tightness and wheezing.    Cardiovascular:  Negative for chest pain and palpitations.   Gastrointestinal:  Negative for blood in stool, constipation, diarrhea and vomiting.   Endocrine: Negative for polydipsia and polyuria.   Genitourinary:  Negative for difficulty urinating, hematuria and urgency.   Musculoskeletal:  Negative for arthralgias, joint swelling and neck pain.   Neurological:  Negative for weakness and headaches.   Psychiatric/Behavioral:  Negative for confusion and dysphoric mood.           Objective     Vital Signs (Most Recent)  Pulse: 63 (02/24/25 1215)  BP: 115/69 (02/24/25 1215)     78.7 kg (173 lb 8 oz)     Physical Exam:  Physical Exam  Vitals reviewed.   Constitutional:       General: He is not in acute distress.     Appearance: He is well-developed. He is not toxic-appearing.   HENT:      Head: Normocephalic and atraumatic.      Right Ear: External ear normal.      Left Ear: External ear normal.      Nose: Nose normal.      Mouth/Throat:      Mouth: Mucous membranes are moist.   Eyes:      Extraocular Movements: Extraocular movements intact.      Conjunctiva/sclera: Conjunctivae normal.   Cardiovascular:      Rate and Rhythm: Normal rate.   Pulmonary:      Effort: Pulmonary effort is normal. No respiratory distress.   Abdominal:      Comments: Well-healed surgical scars no signs of hernia recurrence   Musculoskeletal:      Cervical back: Normal range of motion.   Skin:     General: Skin is warm and dry.   Neurological:      Mental Status: He is alert and oriented to person, place, and time.   Psychiatric:         Behavior: Behavior normal.            Assessment and Plan     44-year-old male status post bilateral inguinal hernia repair    -symptoms consistent with neuropathic discomfort  -Rx gabapentin, Motrin, Medrol Dosepak  -discussed typically symptoms will improve during the  healing process and decrease of inflammation. If symptoms persist following conservative management can consider potential nerve block/ablation

## 2025-02-25 LAB — COMPLEXED PSA SERPL-MCNC: 0.48 NG/ML (ref 0–4)

## 2025-02-26 ENCOUNTER — CLINICAL SUPPORT (OUTPATIENT)
Dept: UROLOGY | Facility: CLINIC | Age: 45
End: 2025-02-26
Payer: COMMERCIAL

## 2025-02-26 DIAGNOSIS — Z30.09 VASECTOMY EVALUATION: Primary | ICD-10-CM

## 2025-02-27 ENCOUNTER — OFFICE VISIT (OUTPATIENT)
Dept: PRIMARY CARE CLINIC | Facility: CLINIC | Age: 45
End: 2025-02-27
Payer: COMMERCIAL

## 2025-02-27 VITALS
TEMPERATURE: 99 F | HEART RATE: 71 BPM | OXYGEN SATURATION: 97 % | BODY MASS INDEX: 24.37 KG/M2 | SYSTOLIC BLOOD PRESSURE: 118 MMHG | DIASTOLIC BLOOD PRESSURE: 68 MMHG | RESPIRATION RATE: 18 BRPM | WEIGHT: 169.88 LBS

## 2025-02-27 DIAGNOSIS — G47.33 OSA ON CPAP: ICD-10-CM

## 2025-02-27 DIAGNOSIS — E66.811 CLASS 1 OBESITY WITH ALVEOLAR HYPOVENTILATION, SERIOUS COMORBIDITY, AND BODY MASS INDEX (BMI) OF 34.0 TO 34.9 IN ADULT: ICD-10-CM

## 2025-02-27 DIAGNOSIS — K59.00 CONSTIPATION, UNSPECIFIED CONSTIPATION TYPE: ICD-10-CM

## 2025-02-27 DIAGNOSIS — L65.9 HAIR THINNING: ICD-10-CM

## 2025-02-27 DIAGNOSIS — R74.8 ELEVATED LIVER ENZYMES: ICD-10-CM

## 2025-02-27 DIAGNOSIS — Z00.00 ROUTINE GENERAL MEDICAL EXAMINATION AT A HEALTH CARE FACILITY: Primary | ICD-10-CM

## 2025-02-27 DIAGNOSIS — E66.2 CLASS 1 OBESITY WITH ALVEOLAR HYPOVENTILATION, SERIOUS COMORBIDITY, AND BODY MASS INDEX (BMI) OF 34.0 TO 34.9 IN ADULT: ICD-10-CM

## 2025-02-27 PROCEDURE — 99999 PR PBB SHADOW E&M-EST. PATIENT-LVL IV: CPT | Mod: PBBFAC,,, | Performed by: FAMILY MEDICINE

## 2025-02-27 RX ORDER — FINASTERIDE 1 MG/1
1 TABLET, FILM COATED ORAL DAILY
Qty: 90 TABLET | Refills: 3 | Status: SHIPPED | OUTPATIENT
Start: 2025-02-27

## 2025-02-27 RX ORDER — TIRZEPATIDE 10 MG/.5ML
10 INJECTION, SOLUTION SUBCUTANEOUS
Qty: 4 PEN | Refills: 2 | Status: SHIPPED | OUTPATIENT
Start: 2025-02-27

## 2025-02-27 NOTE — PROGRESS NOTES
Chief Complaint  Chief Complaint   Patient presents with    Annual Exam       HPI  Luiz Ybarra is a 45 y.o. male with multiple medical diagnoses as listed in the medical history and problem list that presents for  in person visit.     History of Present Illness    CHIEF COMPLAINT:  - Patient presents for a follow-up visit after a recent robotic bilateral inguinal hernia repair and bilateral vasectomy, with concerns about post-operative pain.    HPI:  Patient is a 45-year-old male who underwent robotic bilateral inguinal hernia repair and bilateral vasectomy on January 7th. He reports intermittent neuropathic pain near his left hip since the surgery. He resumed physical activity and running 2 weeks ago. For the first 4 days, he felt fine, but then started having pain, described as a pulling sensation when standing up or trying to stand straight. The pain is localized and reproducible with palpation. The pain does not occur every time he stands up, but sometimes he has sudden stabbing pain while standing still. Sitting does not cause pain, but running does. He was prescribed gabapentin and a steroid pack for pain management, but has only been taking ibuprofen due to concerns about gabapentin's side effects. He takes one 800mg ibuprofen daily, primarily when he feels pain or soreness from exercising. He completed a post-operative visit with Dr. Tam, who suggested the pain might be related to nerve regrowth and inflammation in the surgical area.    MEDICATIONS:  - Linzess, used occasionally (about once a week) for chronic constipation  - Finasteride, for hair growth  - Ibuprofen 800 mg, 1 tablet daily as needed for post-operative pain  - Prednisone, currently taking a steroid Dosepak  - CPAP, used nightly for obstructive sleep apnea  - Tirzepatide (Mounjaro), 10 mL every 2 weeks for weight loss  - Probiotics, daily use  - Discontinued regular use of Linzess, now used only occasionally (about once a week)  -  Discontinued regular use of Albuterol inhaler    PMH:  - Obstructive sleep apnea  - Constipation  - Gout  - Kidney stones  - Diverticulosis    RECENT/REMOTE SURGICAL HISTORY:  - Robotic bilateral inguinal hernia repair: January 7th (year not specified), some intermittent neuropathic pain postoperatively near left hip  - Bilateral vasectomy: January 7th (year not specified)    SOCIAL HISTORY:  - Occupation: Works at [employer]         No questionnaires on file.       Pmh, Psh, Family Hx, Social Hx, HM updated in Epic Tabs today.    Review of Systems   Constitutional:  Negative for activity change, appetite change, chills, fatigue and unexpected weight change.   HENT:  Negative for congestion, ear pain, postnasal drip, sneezing, sore throat and trouble swallowing.    Eyes:  Negative for pain and visual disturbance.   Respiratory:  Negative for cough and shortness of breath.    Cardiovascular:  Negative for chest pain and leg swelling.   Gastrointestinal:  Negative for abdominal pain, constipation, diarrhea, nausea and vomiting.   Endocrine: Negative for cold intolerance and heat intolerance.   Genitourinary:  Negative for difficulty urinating, dysuria and flank pain.   Musculoskeletal:  Negative for arthralgias, back pain, joint swelling and neck pain.   Skin:  Negative for color change and rash.   Neurological:  Negative for dizziness, seizures and headaches.   Psychiatric/Behavioral:  Negative for behavioral problems, dysphoric mood and sleep disturbance. The patient is not nervous/anxious.         Objective:     Vitals:    02/27/25 0750   BP: 118/68   BP Location: Right arm   Patient Position: Sitting   Pulse: 71   Resp: 18   Temp: 98.5 °F (36.9 °C)   SpO2: 97%   Weight: 77 kg (169 lb 13.8 oz)     Wt Readings from Last 10 Encounters:   02/27/25 77 kg (169 lb 13.8 oz)   02/24/25 78.7 kg (173 lb 8 oz)   01/29/25 74.9 kg (165 lb 2 oz)   01/07/25 76.4 kg (168 lb 5.1 oz)   12/09/24 77 kg (169 lb 12.1 oz)   12/04/24 77.1  kg (169 lb 15.6 oz)   02/27/24 75.4 kg (166 lb 1.9 oz)   01/09/24 84.6 kg (186 lb 8.2 oz)   05/02/23 84.6 kg (186 lb 8.2 oz)   02/10/23 94.8 kg (209 lb)     Physical Exam    TEST RESULTS:  - AST: 12/31/2023, 41 (slightly elevated, normal is 40)  - Complete Blood Count: Recent, no signs of infection, no anemia  - Electrolytes: Recent, sodium and potassium normal  - Kidney function: Recent, normal  - Fasting glucose: Recent, normal  - HDL cholesterol: Recent, 73  - Total cholesterol: Recent, 188  - LDL cholesterol: Recent, 105  - Cholesterol ratio: Recent, 2.6  - HbA1c: Recent, 4.4  - Thyroid tests: Recent, normal  - PSA: Recent, normal (slight rise noted as expected with aging)  - Sleep study: 2017 or 2018, AHI of 19.6 (indicating moderate obstructive sleep apnea)  IMAGING:  - CT: 2020, diagnosed diverticulosis, liver appeared normal (no cysts, masses, or fatty liver noted), gallbladder normal       Physical Exam  Vitals reviewed.   Constitutional:       General: He is not in acute distress.     Appearance: Normal appearance. He is well-developed and normal weight.   HENT:      Head: Normocephalic and atraumatic.      Right Ear: Tympanic membrane and external ear normal.      Left Ear: Tympanic membrane and external ear normal.      Nose: Nose normal.      Mouth/Throat:      Mouth: Mucous membranes are moist.      Pharynx: Oropharynx is clear.   Eyes:      Conjunctiva/sclera: Conjunctivae normal.      Pupils: Pupils are equal, round, and reactive to light.   Neck:      Thyroid: No thyromegaly.   Cardiovascular:      Rate and Rhythm: Normal rate and regular rhythm.      Heart sounds: No murmur heard.     No friction rub. No gallop.   Pulmonary:      Effort: Pulmonary effort is normal. No respiratory distress.      Breath sounds: Normal breath sounds.   Abdominal:      General: Bowel sounds are normal. There is no distension.      Palpations: Abdomen is soft.      Tenderness: There is no abdominal tenderness. There is  no rebound.   Musculoskeletal:         General: Normal range of motion.      Cervical back: Normal range of motion and neck supple.      Comments: Mild tenderness with external hip rotation on Left IS region consistent with prior nerve pain s/p inguinal hernia repair. No bulge. No rash.    Lymphadenopathy:      Cervical: No cervical adenopathy.   Skin:     General: Skin is warm and dry.   Neurological:      General: No focal deficit present.      Mental Status: He is alert and oriented to person, place, and time.      Coordination: Coordination normal.   Psychiatric:         Attention and Perception: Attention normal.         Mood and Affect: Mood and affect normal.         Speech: Speech normal.         Behavior: Behavior normal.         Thought Content: Thought content normal.         Cognition and Memory: Cognition normal.         Judgment: Judgment normal.         Assessment:   LABS:   Lab Results   Component Value Date    HGBA1C 4.4 02/24/2025    HGBA1C 4.4 02/22/2024    HGBA1C 4.6 02/09/2023      Lab Results   Component Value Date    CHOL 188 02/24/2025    CHOL 181 02/22/2024    CHOL 214 (H) 02/09/2023     Lab Results   Component Value Date    LDLCALC 105.0 02/24/2025    LDLCALC 102.0 02/22/2024    LDLCALC 144.2 02/09/2023     Lab Results   Component Value Date    WBC 4.49 02/24/2025    HGB 14.9 02/24/2025    HCT 44.0 02/24/2025     02/24/2025    CHOL 188 02/24/2025    TRIG 50 02/24/2025    HDL 73 02/24/2025    ALT 42 02/24/2025    AST 52 (H) 02/24/2025     02/24/2025    K 5.0 02/24/2025     02/24/2025    CREATININE 0.7 02/24/2025    BUN 17 02/24/2025    CO2 25 02/24/2025    TSH 1.038 02/24/2025    PSA 0.48 02/24/2025    HGBA1C 4.4 02/24/2025       Plan:   Assessment & Plan    Z00.00 Routine general medical exam at a health care facility  G47.33 JERRELL on CPAP  K59.00 Constipation, unspecified constipation type  R74.8 Elevated liver enzymes  L65.9 Hair thinning  E66.811, E66.2, Z68.34 Class 1  obesity with alveolar hypoventilation, serious comorbidity, and body mass index (BMI) of 34.0 to 34.9 in adult    IMPRESSION:  - Evaluated post-operative pain following recent bilateral inguinal hernia repair and vasectomy  - Attributed intermittent left hip pain to nerve regeneration and inflammation  - Considered topical lidocaine as alternative to oral NSAIDs for pain management  - Assessed slightly elevated AST (41, normal <40), likely due to recent NSAID use  - Reviewed past CT (2020) showing no liver abnormalities; low risk for fatty liver given patient's weight loss  - Discussed potential use of Zepbound (tirzepatide) for obstructive sleep apnea management, pending insurance coverage    PLAN SUMMARY:  - Initiated trial of lidocaine patches or ointment for localized pain relief  - Refilled probiotic prescription for 1 year  - Continued Linzess (linaclotide) for chronic constipation, 90-day supply with refills  - Continued finasteride for hair growth, 90-day supply  - Ordered non-fasting hepatic function panel for 3 months  - Follow-up appointment in 3 months to review hepatic function panel results  - Recommend exploring [employer] [facility] for fitness classes    ROUTINE GENERAL MEDICAL EXAMINATION AT A HEALTH CARE FACILITY:  - Explained the etiology of post-surgical nerve pain and its expected improvement over time.  - Initiated a trial of lidocaine patches or ointment for localized pain relief, to be used as needed.  - Scheduled a follow-up visit in 3 months to review hepatic function panel results.    CONSTIPATION, UNSPECIFIED CONSTIPATION TYPE:  - Continued Linzess (linaclotide) for chronic constipation management, prescribing a 90-day supply with refills.  - Refilled probiotic prescription for 1 year.    ELEVATED LIVER ENZYMES:  - Ordered a non-fasting hepatic function panel to be conducted in 3 months.  - Instructed the patient to contact the office if liver enzyme elevation persists or  worsens after discontinuing NSAIDs.    HAIR THINNING:  - Continued finasteride for hair growth management, prescribing a 90-day supply.    FITNESS AND WELLNESS:  - Recommend exploring [employer] [facility] for fitness classes and recovery services.  - Informed patient about associated benefits for [employer] employees at this facility.       Luiz was seen today for annual exam.    Diagnoses and all orders for this visit:    Routine general medical examination at a health care facility    JERRELL on CPAP  -     tirzepatide, weight loss, (ZEPBOUND) 10 mg/0.5 mL PnIj; Inject 10 mg into the skin every 7 days.    Constipation, unspecified constipation type  -     linaCLOtide (LINZESS) 72 mcg Cap capsule; Take 1 capsule (72 mcg total) by mouth before breakfast.    Elevated liver enzymes  -     Hepatic Function Panel; Future    Hair thinning  -     finasteride (PROPECIA) 1 mg tablet; Take 1 tablet (1 mg total) by mouth once daily.    Class 1 obesity with alveolar hypoventilation, serious comorbidity, and body mass index (BMI) of 34.0 to 34.9 in adult  -     tirzepatide, weight loss, (ZEPBOUND) 10 mg/0.5 mL PnIj; Inject 10 mg into the skin every 7 days.        XR CHEST PA AND LATERAL  Narrative: EXAMINATION:  XR CHEST PA AND LATERAL    CLINICAL HISTORY:  Shortness of breath    TECHNIQUE:  PA and lateral views of the chest were performed.    COMPARISON:  None    FINDINGS:  There is an opacity in the right lower lobe, compatible with pneumonia.  The lungs are otherwise clear.  The pleural spaces are clear.  The cardiac silhouette is unremarkable.  Osseous structures are intact.  Impression: Right lower lobe pneumonia.    Electronically signed by: Clarence Paris  Date:    01/09/2024  Time:    17:18    The 10-year ASCVD risk score (Mikhail MOCTEZUMA, et al., 2019) is: 0.9%    Values used to calculate the score:      Age: 45 years      Sex: Male      Is Non- : No      Diabetic: No      Tobacco smoker:  No      Systolic Blood Pressure: 118 mmHg      Is BP treated: No      HDL Cholesterol: 73 mg/dL      Total Cholesterol: 188 mg/dL    Follow-up: Follow up in about 1 year (around 2/27/2026) for physical with Dr ARAGON.    I spent a total of   45    minutes face to face and non-face to face on the date of this visit.This includes time preparing to see the patient (eg, review of tests, notes), obtaining and/or reviewing additional history from an independent historian and/or outside medical records, documenting clinical information in the electronic health record, independently interpreting results and/or communicating results to the patient/family/caregiver, or care coordinator.  Visit today included increased complexity associated with the care of the episodic problem addressed and managing the longitudinal care of the patient due to the serious and/or complex managed problem(s).    This note was generated with the assistance of ambient listening technology. Verbal consent was obtained by the patient and accompanying visitor(s) for the recording of patient appointment to facilitate this note. I attest to having reviewed and edited the generated note for accuracy, though some syntax or spelling errors may persist. Please contact the author of this note for any clarification.       There are no Patient Instructions on file for this visit.

## 2025-04-23 ENCOUNTER — OFFICE VISIT (OUTPATIENT)
Dept: PULMONOLOGY | Facility: CLINIC | Age: 45
End: 2025-04-23
Payer: COMMERCIAL

## 2025-04-23 ENCOUNTER — PATIENT MESSAGE (OUTPATIENT)
Dept: PRIMARY CARE CLINIC | Facility: CLINIC | Age: 45
End: 2025-04-23
Payer: COMMERCIAL

## 2025-04-23 VITALS
BODY MASS INDEX: 24.05 KG/M2 | HEIGHT: 70 IN | DIASTOLIC BLOOD PRESSURE: 70 MMHG | WEIGHT: 168 LBS | SYSTOLIC BLOOD PRESSURE: 110 MMHG

## 2025-04-23 DIAGNOSIS — G47.33 OSA ON CPAP: ICD-10-CM

## 2025-04-23 DIAGNOSIS — E66.09 CLASS 1 OBESITY DUE TO EXCESS CALORIES WITH SERIOUS COMORBIDITY AND BODY MASS INDEX (BMI) OF 32.0 TO 32.9 IN ADULT: Primary | ICD-10-CM

## 2025-04-23 DIAGNOSIS — E66.811 CLASS 1 OBESITY DUE TO EXCESS CALORIES WITH SERIOUS COMORBIDITY AND BODY MASS INDEX (BMI) OF 32.0 TO 32.9 IN ADULT: Primary | ICD-10-CM

## 2025-04-23 NOTE — ASSESSMENT & PLAN NOTE
4/23/2025   EPWORTH SLEEPINESS SCALE   Sitting and reading 0   Watching TV 0   Sitting, inactive in a public place (e.g. a theatre or a meeting) 0   As a passenger in a car for an hour without a break 0   Lying down to rest in the afternoon when circumstances permit 3   Sitting and talking to someone 0   Sitting quietly after a lunch without alcohol 0   In a car, while stopped for a few minutes in traffic 0   Total score 3        Data 01/23/2025- 04/22/2025  APAP 5-20  Usage : > 4 hrs: 90%  AHI 1.1      New supplies

## 2025-04-23 NOTE — ASSESSMENT & PLAN NOTE
Patient would benefit from weight loss and has tried to set realistic goals to achieve success. Lifestyle changes were discussed on eating healthy, exercising at least 150 minutes weekly, and reducing sedentary behavior.   Discussed the risk factors associated with obesity: Arthritis/JERRELL/Diabetes/Fatty Liver/Cardiovascular disease/GERD/HTN/HLP.

## 2025-04-23 NOTE — PROGRESS NOTES
The patient location is: [facility]  The chief complaint leading to consultation is:   Chief Complaint   Patient presents with    Apnea        Visit type: audiovisual    Face to Face time with patient: 12 mins  30 minutes of total time spent on the encounter, which includes face to face time and non-face to face time preparing to see the patient (eg, review of tests), Obtaining and/or reviewing separately obtained history, Documenting clinical information in the electronic or other health record, Independently interpreting results (not separately reported) and communicating results to the patient/family/caregiver, or Care coordination (not separately reported).         Each patient to whom he or she provides medical services by telemedicine is:  (1) informed of the relationship between the physician and patient and the respective role of any other health care provider with respect to management of the patient; and (2) notified that he or she may decline to receive medical services by telemedicine and may withdraw from such care at any time.    Notes:                                               Pulmonary Outpatient  Visit     Subjective:       Patient ID: Luiz Ybarra is a 45 y.o. male.    Social History  Tobacco Use   Smoking Status Never    Passive exposure: Never   Smokeless Tobacco Never            Chief Complaint: Apnea      Luiz Ybarra is 45 y.o.  Previously seen Ms Hinson  Followup  Needs supplies  APAP 5-20  Bed time: 9-10 pm  Wake time: 05:30 am  Works  No snoring, No DTS          Last previous clinic notes reviewed  HPI: Luiz Ybarra engaged in telemed visit initial compliance assessment JERRELL on CPAP.  He is on Auto CPAP of 5-20 cmH2O pressure which is optimally controlling sleep apnea with apneic index (AHI) 3.7 events an hour.   He is compliant with CPAP use. Complaince download today reveals 100% of days with greater than 4 hours of device use.   Patient reports benefit from  CPAP use and denies snoring and excessive daytime sleepiness.  Patient reports complaint of wanted to change out cpap mask, different nasal pillows. working with [facility] to change out mask . Nasal pillows mask is used. Changed to N30i prefers nasal pillows mask that goes over top of head.     3/17/2022 Home Sleep Study    1 night study  MODERATE OBSTRUCTIVE SLEEP APNEA with overall AHI 19.6/hr ( 132 events): night #1  Oxygen desaturation: 82%. SpO2 between 90% to 94% for 24 min.  Patient snored 99% time above 50 .  Heart rate range: 56 bpm - 168 bpm            Review of Systems   Respiratory: Negative.     All other systems reviewed and are negative.      Encounter Medications[1]        Pertinent Work Up:      Pulmonary Interventions:      Smoking hx:    Environmental/Occupational hx:        Interval Hx:           The following portions of the patient's history were reviewed and updated as appropriate: He  has a past medical history of Gout, Hypoglycemia, Nephrolithiasis, PONV (postoperative nausea and vomiting), and Vasectomy evaluation (12/09/2024).  He does not have any pertinent problems on file.  He  has a past surgical history that includes Knee surgery; Colonoscopy (N/A, 11/6/2017); Colonoscopy (N/A, 12/1/2020); Vasectomy (Bilateral, 1/7/2025); and dv5 robotic repair, hernia, inguinal (Bilateral, 1/7/2025).  His family history includes Cancer in his paternal grandfather; Colon cancer in his maternal grandmother and maternal uncle; Colon polyps in his maternal grandmother, mother, and sister; Diabetes in his father, maternal grandmother, paternal grandmother, and sister; Glaucoma in his maternal grandmother and paternal grandmother.  He  reports that he has never smoked. He has never been exposed to tobacco smoke. He has never used smokeless tobacco. He reports that he does not drink alcohol and does not use drugs.  He has a current medication list which includes the following prescription(s): ascorbic  "acid (vitamin c), bisacodyl, docusate sodium, finasteride, gabapentin, ibuprofen, linaclotide, lorazepam, ondansetron, mounjaro, zepbound, and albuterol.  Medications Ordered Prior to Encounter[2].      BP Readings from Last 3 Encounters:   04/23/25 110/70   02/27/25 118/68   02/24/25 115/69      [place] Sleepiness Scale TOTAL =        4/23/2025   EPWORTH SLEEPINESS SCALE   Sitting and reading 0   Watching TV 0   Sitting, inactive in a public place (e.g. a theatre or a meeting) 0   As a passenger in a car for an hour without a break 0   Lying down to rest in the afternoon when circumstances permit 3   Sitting and talking to someone 0   Sitting quietly after a lunch without alcohol 0   In a car, while stopped for a few minutes in traffic 0   Total score 3      (validated sleepiness questionnaire with a higher score indicating greater sleepiness; range 0-24)  Failed to redirect to the Timeline version of the [employer] SmartLink.         MMRC Dyspnea Scale (4 is worst)     [] MMRC 0: Dyspneic on strenuous excercise (0 points)    [] MMRC 1: Dyspneic on walking a slight hill (0 points)    [] MMRC 2: Dyspneic on walking level ground; must stop occasionally due to breathlessness (1 point)    [] MMRC 3: Must stop for breathlessness after walking 100 yards or after a few minutes (2 points)    [] MMRC 4: Cannot leave house; breathless on dressing/undressing (3 points)                             Objective:     Vital Signs (Most Recent)  Vital Signs  BP: 110/70  Height and Weight  Height: 5' 10" (177.8 cm)  Weight: 76.2 kg (168 lb)  BSA (Calculated - sq m): 1.94 sq meters  BMI (Calculated): 24.1  Weight in (lb) to have BMI = 25: 173.9]  Wt Readings from Last 2 Encounters:   04/23/25 76.2 kg (168 lb)   02/27/25 77 kg (169 lb 13.8 oz)       Physical Exam  Vitals and nursing note reviewed.   HENT:      Head: Normocephalic and atraumatic.   Eyes:      Pupils: Pupils are equal, round, and reactive to light.   Neurological:      Mental " "Status: He is alert and oriented to person, place, and time.          Laboratory  Lab Results   Component Value Date    WBC 4.49 2025    RBC 4.74 2025    HGB 14.9 2025    HCT 44.0 2025    MCV 93 2025    MCH 31.4 (H) 2025    MCHC 33.9 2025    RDW 14.0 2025     2025    MPV 10.0 2025    GRAN 2.6 2025    GRAN 58.6 2025    LYMPH 1.3 2025    LYMPH 28.5 2025    MONO 0.5 2025    MONO 10.5 2025    EOS 0.1 2025    BASO 0.04 2025    EOSINOPHIL 1.1 2025    BASOPHIL 0.9 2025       BMP  Lab Results   Component Value Date     2025    K 5.0 2025     2025    CO2 25 2025    BUN 17 2025    CREATININE 0.7 2025    CALCIUM 9.1 2025    ANIONGAP 10 2025    ESTGFRAFRICA >60.0 2022    EGFRNONAA >60.0 2022    AST 52 (H) 2025    ALT 42 2025    PROT 6.5 2025          No results found for: "IGE"     No results found for: "ASPERGILLUS"  No results found for: "AFUMIGATUSCL"     No results found for: "ACE"     Diagnostic Results:  I have personally reviewed today the following studies:    XR CHEST PA AND LATERAL  Narrative: EXAMINATION:  XR CHEST PA AND LATERAL    CLINICAL HISTORY:  Shortness of breath    TECHNIQUE:  PA and lateral views of the chest were performed.    COMPARISON:  None    FINDINGS:  There is an opacity in the right lower lobe, compatible with pneumonia.  The lungs are otherwise clear.  The pleural spaces are clear.  The cardiac silhouette is unremarkable.  Osseous structures are intact.  Impression: Right lower lobe pneumonia.    Electronically signed by: Clarence Paris  Date:    2024  Time:    17:18     2025 - 2025  Patient ID: [id number]  : 1980  Age: 45 years  Gender: Male  [facility]  [address] [facility], [address]  Compliance Report  Compliance  Payor " Standard  Usage 01/23/2025 - 04/22/2025  Usage days 81/90 days (90%)  >= 4 hours 81 days (90%)  < 4 hours 0 days (0%)  Usage hours 543 hours 49 minutes  Average usage (total days) 6 hours 3 minutes  Average usage (days used) 6 hours 43 minutes  Median usage (days used) 6 hours 51 minutes  Total used hours (value since last reset - 04/22/2025) 6,620 hours  AirSense 11 AutoSet  Serial number [id number]  Mode AutoSet  Min Pressure 5 cmH2O  Max Pressure 20 cmH2O  EPR Fulltime  EPR level 3  Response Standard  Therapy  Pressure - cmH2O Median: 5.2 95th percentile: 6.3 Maximum: 7.0  Leaks - L/min Median: 8.3 95th percentile: 22.0 Maximum: 28.3  Events per hour AI: 1.1 HI: 0.0 AHI: 1.1  Apnea Index Central: 0.9 Obstructive: 0.1 Unknown: 0.0  RERA Index 0.0      Assessment/Plan:          1. Class 1 obesity due to excess calories with serious comorbidity and body mass index (BMI) of 32.0 to 32.9 in adult  Assessment & Plan:  Patient would benefit from weight loss and has tried to set realistic goals to achieve success. Lifestyle changes were discussed on eating healthy, exercising at least 150 minutes weekly, and reducing sedentary behavior.   Discussed the risk factors associated with obesity: Arthritis/JERRELL/Diabetes/Fatty Liver/Cardiovascular disease/GERD/HTN/HLP.       2. JERRELL on CPAP  Overview:  3/17/2022 Home Sleep Study    1 night study  MODERATE OBSTRUCTIVE SLEEP APNEA with overall AHI 19.6/hr ( 132 events): night #1  Oxygen desaturation: 82%. SpO2 between 90% to 94% for 24 min.  Patient snored 99% time above 50 .  Heart rate range: 56 bpm - 168 bpm  3/22/2022 order Auto CPAP 5-20 cm   Nasal mask   Follow up 31-90 days from obtaining CPAP for ILD.         Assessment & Plan:      4/23/2025   EPWORTH SLEEPINESS SCALE   Sitting and reading 0   Watching TV 0   Sitting, inactive in a public place (e.g. a theatre or a meeting) 0   As a passenger in a car for an hour without a break 0   Lying down to rest in the afternoon  when circumstances permit 3   Sitting and talking to someone 0   Sitting quietly after a lunch without alcohol 0   In a car, while stopped for a few minutes in traffic 0   Total score 3        Data 01/23/2025- 04/22/2025  APAP 5-20  Usage : > 4 hrs: 90%  AHI 1.1      New supplies    Orders:  -     CPAP/BIPAP SUPPLIES           Follow up in about 1 year (around 4/23/2026).    This note was prepared using voice recognition system and is likely to have sound alike errors that may have been overlooked even after proof reading.  Please call me with any questions    Discussed diagnosis, its evaluation, treatment and usual course. All questions answered.    The patient was given open opportunity to ask questions and/or express concerns about treatment plan.   All questions/concerns were discussed.       Two patient identifiers used prior to evaluation.     Thank you for the courtesy of participating in the care of this patient    Alfonso Patterson MD      Personal Diagnostic Review  []  CXR    []  ECHO    []  ONSAT    []  6MWD    []  LABS    []  CHEST CT    []  PET CT    []  Biopsy results          New            [1]   Outpatient Encounter Medications as of 4/23/2025   Medication Sig Dispense Refill    ascorbic acid, vitamin C, (VITAMIN C) 500 MG tablet Take 500 mg by mouth once daily.      bisacodyL (DULCOLAX) 5 mg EC tablet Take 10 mg by mouth once.      docusate sodium (COLACE) 100 MG capsule Take 300 mg by mouth once daily.      finasteride (PROPECIA) 1 mg tablet Take 1 tablet (1 mg total) by mouth once daily. 90 tablet 3    gabapentin (NEURONTIN) 100 MG capsule Take 1 capsule (100 mg total) by mouth 3 (three) times daily. 90 capsule 2    ibuprofen (ADVIL,MOTRIN) 800 MG tablet Take 1 tablet (800 mg total) by mouth 3 (three) times daily. 30 tablet 0    linaCLOtide (LINZESS) 72 mcg Cap capsule Take 1 capsule (72 mcg total) by mouth before breakfast. 90 capsule 3    LORazepam (ATIVAN) 1 MG tablet Take 1 tablet by mouth  the night before appointment and 1 hour prior to appt 2 tablet 0    ondansetron (ZOFRAN-ODT) 4 MG TbDL Place 1-2 tablets (4 mg total) under tongue every 8 (eight) hours as needed for nausea 30 tablet 0    tirzepatide (MOUNJARO) 10 mg/0.5 mL PnIj Inject 10 mg into the skin every 7 days.      tirzepatide, weight loss, (ZEPBOUND) 10 mg/0.5 mL PnIj Inject 10 mg into the skin every 7 days. 4 Pen 2    albuterol (VENTOLIN HFA) 90 mcg/actuation inhaler Inhale 2 puffs into the lungs every 6 (six) hours as needed for Wheezing. Rescue 18 g 0     No facility-administered encounter medications on file as of 4/23/2025.   [2]   Current Outpatient Medications on File Prior to Visit   Medication Sig Dispense Refill    ascorbic acid, vitamin C, (VITAMIN C) 500 MG tablet Take 500 mg by mouth once daily.      bisacodyL (DULCOLAX) 5 mg EC tablet Take 10 mg by mouth once.      docusate sodium (COLACE) 100 MG capsule Take 300 mg by mouth once daily.      finasteride (PROPECIA) 1 mg tablet Take 1 tablet (1 mg total) by mouth once daily. 90 tablet 3    gabapentin (NEURONTIN) 100 MG capsule Take 1 capsule (100 mg total) by mouth 3 (three) times daily. 90 capsule 2    ibuprofen (ADVIL,MOTRIN) 800 MG tablet Take 1 tablet (800 mg total) by mouth 3 (three) times daily. 30 tablet 0    linaCLOtide (LINZESS) 72 mcg Cap capsule Take 1 capsule (72 mcg total) by mouth before breakfast. 90 capsule 3    LORazepam (ATIVAN) 1 MG tablet Take 1 tablet by mouth the night before appointment and 1 hour prior to appt 2 tablet 0    ondansetron (ZOFRAN-ODT) 4 MG TbDL Place 1-2 tablets (4 mg total) under tongue every 8 (eight) hours as needed for nausea 30 tablet 0    tirzepatide (MOUNJARO) 10 mg/0.5 mL PnIj Inject 10 mg into the skin every 7 days.      tirzepatide, weight loss, (ZEPBOUND) 10 mg/0.5 mL PnIj Inject 10 mg into the skin every 7 days. 4 Pen 2    albuterol (VENTOLIN HFA) 90 mcg/actuation inhaler Inhale 2 puffs into the lungs every 6 (six) hours as  needed for Wheezing. Rescue 18 g 0     No current facility-administered medications on file prior to visit.

## 2025-05-27 ENCOUNTER — LAB VISIT (OUTPATIENT)
Dept: LAB | Facility: HOSPITAL | Age: 45
End: 2025-05-27
Attending: FAMILY MEDICINE
Payer: COMMERCIAL

## 2025-05-27 DIAGNOSIS — R74.8 ELEVATED LIVER ENZYMES: ICD-10-CM

## 2025-05-27 LAB
ALBUMIN SERPL BCP-MCNC: 3.8 G/DL (ref 3.5–5.2)
ALP SERPL-CCNC: 54 UNIT/L (ref 40–150)
ALT SERPL W/O P-5'-P-CCNC: 35 UNIT/L (ref 10–44)
AST SERPL-CCNC: 38 UNIT/L (ref 11–45)
BILIRUB DIRECT SERPL-MCNC: 0.4 MG/DL (ref 0.1–0.3)
BILIRUB SERPL-MCNC: 1.1 MG/DL (ref 0.1–1)
PROT SERPL-MCNC: 6.5 GM/DL (ref 6–8.4)

## 2025-05-27 PROCEDURE — 80076 HEPATIC FUNCTION PANEL: CPT

## 2025-05-27 PROCEDURE — 36415 COLL VENOUS BLD VENIPUNCTURE: CPT | Mod: PN

## 2025-05-29 ENCOUNTER — RESULTS FOLLOW-UP (OUTPATIENT)
Dept: PRIMARY CARE CLINIC | Facility: CLINIC | Age: 45
End: 2025-05-29

## 2025-08-25 ENCOUNTER — OFFICE VISIT (OUTPATIENT)
Dept: OPHTHALMOLOGY | Facility: CLINIC | Age: 45
End: 2025-08-25
Payer: COMMERCIAL

## 2025-08-25 DIAGNOSIS — H52.4 HYPEROPIA WITH ASTIGMATISM AND PRESBYOPIA, BILATERAL: Primary | ICD-10-CM

## 2025-08-25 DIAGNOSIS — H52.03 HYPEROPIA WITH ASTIGMATISM AND PRESBYOPIA, BILATERAL: Primary | ICD-10-CM

## 2025-08-25 DIAGNOSIS — H52.203 HYPEROPIA WITH ASTIGMATISM AND PRESBYOPIA, BILATERAL: Primary | ICD-10-CM

## 2025-08-25 PROCEDURE — 92015 DETERMINE REFRACTIVE STATE: CPT | Mod: S$GLB,,, | Performed by: OPTOMETRIST

## 2025-08-25 PROCEDURE — 92014 COMPRE OPH EXAM EST PT 1/>: CPT | Mod: S$GLB,,, | Performed by: OPTOMETRIST

## 2025-08-25 PROCEDURE — 3044F HG A1C LEVEL LT 7.0%: CPT | Mod: CPTII,S$GLB,, | Performed by: OPTOMETRIST

## 2025-08-25 PROCEDURE — 99999 PR PBB SHADOW E&M-EST. PATIENT-LVL III: CPT | Mod: PBBFAC,,, | Performed by: OPTOMETRIST

## 2025-08-25 PROCEDURE — 1159F MED LIST DOCD IN RCRD: CPT | Mod: CPTII,S$GLB,, | Performed by: OPTOMETRIST

## 2025-08-25 PROCEDURE — 1160F RVW MEDS BY RX/DR IN RCRD: CPT | Mod: CPTII,S$GLB,, | Performed by: OPTOMETRIST

## (undated) DEVICE — SEAL CANN UNIVERSAL 5-12MM

## (undated) DEVICE — SYR PLASTIPAK LL 3ML

## (undated) DEVICE — SUT 4-0 CHROMIC GUT / RB1

## (undated) DEVICE — SOL NORMAL USPCA 0.9%

## (undated) DEVICE — CAUTERY TIP 2 3/4

## (undated) DEVICE — CLIP LIGACLIP XTRA TITANIUM

## (undated) DEVICE — COVER TIP CURVED SCISSORS XI

## (undated) DEVICE — BANDAGE CONFORM STRTCH 1IN

## (undated) DEVICE — MANIFOLD 4 PORT

## (undated) DEVICE — NDL PNEUMO INSUFFLATI 120MM

## (undated) DEVICE — SPONGE COTTON TRAY 4X4IN

## (undated) DEVICE — BNDG COFLEX FOAM LF2 ST 3X5YD

## (undated) DEVICE — TOWEL OR DISP STRL BLUE 4/PK

## (undated) DEVICE — GLOVE SIGNATURE ESSNTL LTX 7.5

## (undated) DEVICE — DRAPE ARM DAVINCI XI

## (undated) DEVICE — Device

## (undated) DEVICE — PACK BASIC SETUP SC BR

## (undated) DEVICE — CANISTER SUCTION JUMBO 12L

## (undated) DEVICE — DRAPE COLUMN DAVINCI XI

## (undated) DEVICE — GOWN POLY REINF BRTH SLV XL

## (undated) DEVICE — PAD PINK TRENDELENBURG POS XL

## (undated) DEVICE — NDL ECLIPSE SAF REG 25GX1.5IN

## (undated) DEVICE — TRAY CATH 1-LYR URIMTR 16FR

## (undated) DEVICE — DRAPE LAPSCP CHOLE 122X102X78

## (undated) DEVICE — APPLICATOR CHLORAPREP ORN 26ML

## (undated) DEVICE — DRESSING GAUZE PETROLATUM 1X8

## (undated) DEVICE — SUT STRATAFIX 2-0 30CM

## (undated) DEVICE — ADHESIVE DERMABOND ADVANCED

## (undated) DEVICE — SUT MCRYL PLUS 4-0 PS2 27IN

## (undated) DEVICE — ELECTRODE REM PLYHSV RETURN 9

## (undated) DEVICE — DRAPE LAP T SHT W/ INSTR PAD

## (undated) DEVICE — COVER LIGHT HANDLE 80/CA

## (undated) DEVICE — GOWN POLY REINF X-LONG XL

## (undated) DEVICE — DRESSING XEROFORM NONADH 1X8IN

## (undated) DEVICE — APPLIER CLIP LIAGCLIP 9.375IN

## (undated) DEVICE — TUBING MEDI-VAC 20FT .25IN

## (undated) DEVICE — SUT MONOCRYL 4.0 PS2 CP496G

## (undated) DEVICE — DRAPE INCISE IOBAN 2 23X17IN

## (undated) DEVICE — KIT ANTIFOG W/SPONG & FLUID

## (undated) DEVICE — SOL ELECTROLUBE ANTI-STIC

## (undated) DEVICE — SUPPORT SCROTAL BAUER ADULT XL

## (undated) DEVICE — SYR 10CC LUER LOCK